# Patient Record
Sex: MALE | Race: WHITE | NOT HISPANIC OR LATINO | Employment: OTHER | URBAN - METROPOLITAN AREA
[De-identification: names, ages, dates, MRNs, and addresses within clinical notes are randomized per-mention and may not be internally consistent; named-entity substitution may affect disease eponyms.]

---

## 2021-09-12 ENCOUNTER — HOSPITAL ENCOUNTER (INPATIENT)
Facility: HOSPITAL | Age: 66
LOS: 7 days | Discharge: HOME HEALTH | DRG: 315 | End: 2021-09-19
Attending: EMERGENCY MEDICINE | Admitting: STUDENT IN AN ORGANIZED HEALTH CARE EDUCATION/TRAINING PROGRAM
Payer: COMMERCIAL

## 2021-09-12 ENCOUNTER — APPOINTMENT (EMERGENCY)
Dept: CT IMAGING | Facility: HOSPITAL | Age: 66
DRG: 315 | End: 2021-09-12
Payer: COMMERCIAL

## 2021-09-12 ENCOUNTER — APPOINTMENT (INPATIENT)
Dept: ULTRASOUND IMAGING | Facility: HOSPITAL | Age: 66
DRG: 315 | End: 2021-09-12
Payer: COMMERCIAL

## 2021-09-12 DIAGNOSIS — I26.99 PULMONARY INFARCT (HCC): ICD-10-CM

## 2021-09-12 DIAGNOSIS — J18.9 RIGHT UPPER LOBE PNEUMONIA: ICD-10-CM

## 2021-09-12 DIAGNOSIS — R19.7 DIARRHEA: ICD-10-CM

## 2021-09-12 DIAGNOSIS — I25.10 CORONARY ARTERY DISEASE: ICD-10-CM

## 2021-09-12 DIAGNOSIS — N42.1 CONGESTION AND HEMORRHAGE OF PROSTATE: ICD-10-CM

## 2021-09-12 DIAGNOSIS — R50.9 FEVER: Primary | ICD-10-CM

## 2021-09-12 DIAGNOSIS — A41.9 SEPSIS (HCC): ICD-10-CM

## 2021-09-12 DIAGNOSIS — I50.32 CHRONIC DIASTOLIC CONGESTIVE HEART FAILURE (HCC): ICD-10-CM

## 2021-09-12 PROBLEM — I10 HYPERTENSION: Status: ACTIVE | Noted: 2021-09-12

## 2021-09-12 PROBLEM — R65.10 SIRS (SYSTEMIC INFLAMMATORY RESPONSE SYNDROME) (HCC): Status: ACTIVE | Noted: 2021-09-12

## 2021-09-12 PROBLEM — I83.91 VARICOSE VEINS OF RIGHT LOWER EXTREMITY: Status: ACTIVE | Noted: 2021-09-12

## 2021-09-12 LAB
ALBUMIN SERPL BCP-MCNC: 3.4 G/DL (ref 3.5–5)
ALP SERPL-CCNC: 62 U/L (ref 46–116)
ALT SERPL W P-5'-P-CCNC: 27 U/L (ref 12–78)
ANION GAP SERPL CALCULATED.3IONS-SCNC: 10 MMOL/L (ref 4–13)
APTT PPP: 37 SECONDS (ref 23–37)
APTT PPP: 40 SECONDS (ref 23–37)
AST SERPL W P-5'-P-CCNC: 21 U/L (ref 5–45)
ATRIAL RATE: 73 BPM
BASOPHILS # BLD AUTO: 0.06 THOUSANDS/ΜL (ref 0–0.1)
BASOPHILS NFR BLD AUTO: 1 % (ref 0–1)
BILIRUB SERPL-MCNC: 0.85 MG/DL (ref 0.2–1)
BILIRUB UR QL STRIP: NEGATIVE
BUN SERPL-MCNC: 15 MG/DL (ref 5–25)
CALCIUM ALBUM COR SERPL-MCNC: 8.8 MG/DL (ref 8.3–10.1)
CALCIUM SERPL-MCNC: 8.3 MG/DL (ref 8.3–10.1)
CHLORIDE SERPL-SCNC: 99 MMOL/L (ref 100–108)
CLARITY UR: CLEAR
CO2 SERPL-SCNC: 24 MMOL/L (ref 21–32)
COLOR UR: YELLOW
CREAT SERPL-MCNC: 0.83 MG/DL (ref 0.6–1.3)
EOSINOPHIL # BLD AUTO: 0.02 THOUSAND/ΜL (ref 0–0.61)
EOSINOPHIL NFR BLD AUTO: 0 % (ref 0–6)
ERYTHROCYTE [DISTWIDTH] IN BLOOD BY AUTOMATED COUNT: 14 % (ref 11.6–15.1)
FLUAV RNA RESP QL NAA+PROBE: NEGATIVE
FLUBV RNA RESP QL NAA+PROBE: NEGATIVE
GFR SERPL CREATININE-BSD FRML MDRD: 92 ML/MIN/1.73SQ M
GLUCOSE SERPL-MCNC: 103 MG/DL (ref 65–140)
GLUCOSE UR STRIP-MCNC: NEGATIVE MG/DL
HCT VFR BLD AUTO: 35.5 % (ref 36.5–49.3)
HGB BLD-MCNC: 12.4 G/DL (ref 12–17)
HGB UR QL STRIP.AUTO: NEGATIVE
IMM GRANULOCYTES # BLD AUTO: 0.08 THOUSAND/UL (ref 0–0.2)
IMM GRANULOCYTES NFR BLD AUTO: 1 % (ref 0–2)
INR PPP: 1.1 (ref 0.84–1.19)
KETONES UR STRIP-MCNC: ABNORMAL MG/DL
LACTATE SERPL-SCNC: 0.7 MMOL/L (ref 0.5–2)
LEUKOCYTE ESTERASE UR QL STRIP: NEGATIVE
LIPASE SERPL-CCNC: 55 U/L (ref 73–393)
LYMPHOCYTES # BLD AUTO: 1.37 THOUSANDS/ΜL (ref 0.6–4.47)
LYMPHOCYTES NFR BLD AUTO: 12 % (ref 14–44)
MCH RBC QN AUTO: 31.2 PG (ref 26.8–34.3)
MCHC RBC AUTO-ENTMCNC: 34.9 G/DL (ref 31.4–37.4)
MCV RBC AUTO: 89 FL (ref 82–98)
MONOCYTES # BLD AUTO: 1.69 THOUSAND/ΜL (ref 0.17–1.22)
MONOCYTES NFR BLD AUTO: 15 % (ref 4–12)
NEUTROPHILS # BLD AUTO: 8.38 THOUSANDS/ΜL (ref 1.85–7.62)
NEUTS SEG NFR BLD AUTO: 71 % (ref 43–75)
NITRITE UR QL STRIP: NEGATIVE
NRBC BLD AUTO-RTO: 0 /100 WBCS
P AXIS: 67 DEGREES
PH UR STRIP.AUTO: 6.5 [PH]
PLATELET # BLD AUTO: 188 THOUSANDS/UL (ref 149–390)
PMV BLD AUTO: 10.2 FL (ref 8.9–12.7)
POTASSIUM SERPL-SCNC: 3.5 MMOL/L (ref 3.5–5.3)
PR INTERVAL: 178 MS
PROT SERPL-MCNC: 7.6 G/DL (ref 6.4–8.2)
PROT UR STRIP-MCNC: NEGATIVE MG/DL
PROTHROMBIN TIME: 14.4 SECONDS (ref 11.6–14.5)
QRS AXIS: 57 DEGREES
QRSD INTERVAL: 112 MS
QT INTERVAL: 424 MS
QTC INTERVAL: 464 MS
RBC # BLD AUTO: 3.97 MILLION/UL (ref 3.88–5.62)
RSV RNA RESP QL NAA+PROBE: NEGATIVE
SARS-COV-2 RNA RESP QL NAA+PROBE: NEGATIVE
SODIUM SERPL-SCNC: 133 MMOL/L (ref 136–145)
SP GR UR STRIP.AUTO: <=1.005 (ref 1–1.03)
T WAVE AXIS: 96 DEGREES
TROPONIN I SERPL-MCNC: <0.02 NG/ML
UROBILINOGEN UR QL STRIP.AUTO: 1 E.U./DL
VENTRICULAR RATE: 72 BPM
WBC # BLD AUTO: 11.6 THOUSAND/UL (ref 4.31–10.16)

## 2021-09-12 PROCEDURE — 84145 PROCALCITONIN (PCT): CPT | Performed by: PHYSICIAN ASSISTANT

## 2021-09-12 PROCEDURE — 99223 1ST HOSP IP/OBS HIGH 75: CPT | Performed by: STUDENT IN AN ORGANIZED HEALTH CARE EDUCATION/TRAINING PROGRAM

## 2021-09-12 PROCEDURE — 71275 CT ANGIOGRAPHY CHEST: CPT

## 2021-09-12 PROCEDURE — 80053 COMPREHEN METABOLIC PANEL: CPT | Performed by: PHYSICIAN ASSISTANT

## 2021-09-12 PROCEDURE — 96365 THER/PROPH/DIAG IV INF INIT: CPT

## 2021-09-12 PROCEDURE — 83605 ASSAY OF LACTIC ACID: CPT | Performed by: PHYSICIAN ASSISTANT

## 2021-09-12 PROCEDURE — 85730 THROMBOPLASTIN TIME PARTIAL: CPT | Performed by: PHYSICIAN ASSISTANT

## 2021-09-12 PROCEDURE — 74177 CT ABD & PELVIS W/CONTRAST: CPT

## 2021-09-12 PROCEDURE — 85025 COMPLETE CBC W/AUTO DIFF WBC: CPT | Performed by: PHYSICIAN ASSISTANT

## 2021-09-12 PROCEDURE — 93005 ELECTROCARDIOGRAM TRACING: CPT

## 2021-09-12 PROCEDURE — 0241U HB NFCT DS VIR RESP RNA 4 TRGT: CPT | Performed by: PHYSICIAN ASSISTANT

## 2021-09-12 PROCEDURE — G1004 CDSM NDSC: HCPCS

## 2021-09-12 PROCEDURE — 36415 COLL VENOUS BLD VENIPUNCTURE: CPT | Performed by: PHYSICIAN ASSISTANT

## 2021-09-12 PROCEDURE — 99285 EMERGENCY DEPT VISIT HI MDM: CPT | Performed by: PHYSICIAN ASSISTANT

## 2021-09-12 PROCEDURE — 87040 BLOOD CULTURE FOR BACTERIA: CPT | Performed by: PHYSICIAN ASSISTANT

## 2021-09-12 PROCEDURE — 93010 ELECTROCARDIOGRAM REPORT: CPT | Performed by: INTERNAL MEDICINE

## 2021-09-12 PROCEDURE — 84484 ASSAY OF TROPONIN QUANT: CPT | Performed by: PHYSICIAN ASSISTANT

## 2021-09-12 PROCEDURE — 81003 URINALYSIS AUTO W/O SCOPE: CPT | Performed by: PHYSICIAN ASSISTANT

## 2021-09-12 PROCEDURE — 83690 ASSAY OF LIPASE: CPT | Performed by: PHYSICIAN ASSISTANT

## 2021-09-12 PROCEDURE — 93971 EXTREMITY STUDY: CPT

## 2021-09-12 PROCEDURE — 99285 EMERGENCY DEPT VISIT HI MDM: CPT

## 2021-09-12 PROCEDURE — 96361 HYDRATE IV INFUSION ADD-ON: CPT

## 2021-09-12 PROCEDURE — 85610 PROTHROMBIN TIME: CPT | Performed by: PHYSICIAN ASSISTANT

## 2021-09-12 RX ORDER — FUROSEMIDE 40 MG/1
1 TABLET ORAL DAILY
COMMUNITY
Start: 2013-09-13 | End: 2021-11-02 | Stop reason: SDUPTHER

## 2021-09-12 RX ORDER — ISOSORBIDE MONONITRATE 60 MG/1
60 TABLET, EXTENDED RELEASE ORAL DAILY
Status: DISCONTINUED | OUTPATIENT
Start: 2021-09-12 | End: 2021-09-19 | Stop reason: HOSPADM

## 2021-09-12 RX ORDER — FUROSEMIDE 40 MG/1
40 TABLET ORAL DAILY
Status: DISCONTINUED | OUTPATIENT
Start: 2021-09-12 | End: 2021-09-19 | Stop reason: HOSPADM

## 2021-09-12 RX ORDER — SENNOSIDES 8.6 MG
1 TABLET ORAL DAILY
Status: DISCONTINUED | OUTPATIENT
Start: 2021-09-12 | End: 2021-09-19 | Stop reason: HOSPADM

## 2021-09-12 RX ORDER — ONDANSETRON 2 MG/ML
4 INJECTION INTRAMUSCULAR; INTRAVENOUS EVERY 6 HOURS PRN
Status: DISCONTINUED | OUTPATIENT
Start: 2021-09-12 | End: 2021-09-19 | Stop reason: HOSPADM

## 2021-09-12 RX ORDER — CARVEDILOL 12.5 MG/1
25 TABLET ORAL 2 TIMES DAILY WITH MEALS
Status: DISCONTINUED | OUTPATIENT
Start: 2021-09-12 | End: 2021-09-19 | Stop reason: HOSPADM

## 2021-09-12 RX ORDER — HYDROMORPHONE HCL/PF 1 MG/ML
1 SYRINGE (ML) INJECTION EVERY 4 HOURS PRN
Status: DISCONTINUED | OUTPATIENT
Start: 2021-09-12 | End: 2021-09-15

## 2021-09-12 RX ORDER — HYDRALAZINE HYDROCHLORIDE 20 MG/ML
5 INJECTION INTRAMUSCULAR; INTRAVENOUS EVERY 6 HOURS PRN
Status: DISCONTINUED | OUTPATIENT
Start: 2021-09-12 | End: 2021-09-19 | Stop reason: HOSPADM

## 2021-09-12 RX ORDER — LOSARTAN POTASSIUM 50 MG/1
100 TABLET ORAL DAILY
Status: DISCONTINUED | OUTPATIENT
Start: 2021-09-12 | End: 2021-09-19 | Stop reason: HOSPADM

## 2021-09-12 RX ORDER — DULOXETIN HYDROCHLORIDE 60 MG/1
60 CAPSULE, DELAYED RELEASE ORAL DAILY
COMMUNITY
Start: 2021-04-02 | End: 2021-11-02 | Stop reason: SDUPTHER

## 2021-09-12 RX ORDER — CLOPIDOGREL BISULFATE 75 MG/1
75 TABLET ORAL DAILY
Status: DISCONTINUED | OUTPATIENT
Start: 2021-09-12 | End: 2021-09-19 | Stop reason: HOSPADM

## 2021-09-12 RX ORDER — ATORVASTATIN CALCIUM 40 MG/1
40 TABLET, FILM COATED ORAL
Status: DISCONTINUED | OUTPATIENT
Start: 2021-09-12 | End: 2021-09-19 | Stop reason: HOSPADM

## 2021-09-12 RX ORDER — HEPARIN SODIUM 5000 [USP'U]/ML
5000 INJECTION, SOLUTION INTRAVENOUS; SUBCUTANEOUS EVERY 8 HOURS SCHEDULED
Status: DISCONTINUED | OUTPATIENT
Start: 2021-09-12 | End: 2021-09-17

## 2021-09-12 RX ORDER — CALCIUM CARBONATE 200(500)MG
1000 TABLET,CHEWABLE ORAL DAILY PRN
Status: DISCONTINUED | OUTPATIENT
Start: 2021-09-12 | End: 2021-09-19 | Stop reason: HOSPADM

## 2021-09-12 RX ORDER — FLUOXETINE HYDROCHLORIDE 40 MG/1
1 CAPSULE ORAL DAILY
COMMUNITY
End: 2021-11-02

## 2021-09-12 RX ORDER — GABAPENTIN 400 MG/1
400 CAPSULE ORAL
Status: DISCONTINUED | OUTPATIENT
Start: 2021-09-12 | End: 2021-09-19 | Stop reason: HOSPADM

## 2021-09-12 RX ORDER — HEPARIN SODIUM 1000 [USP'U]/ML
10000 INJECTION, SOLUTION INTRAVENOUS; SUBCUTANEOUS ONCE
Status: COMPLETED | OUTPATIENT
Start: 2021-09-12 | End: 2021-09-12

## 2021-09-12 RX ORDER — HYDROMORPHONE HCL/PF 1 MG/ML
0.5 SYRINGE (ML) INJECTION EVERY 6 HOURS PRN
Status: DISCONTINUED | OUTPATIENT
Start: 2021-09-12 | End: 2021-09-19 | Stop reason: HOSPADM

## 2021-09-12 RX ORDER — ACETAMINOPHEN 325 MG/1
975 TABLET ORAL ONCE
Status: COMPLETED | OUTPATIENT
Start: 2021-09-12 | End: 2021-09-12

## 2021-09-12 RX ORDER — OXYCODONE HYDROCHLORIDE AND ACETAMINOPHEN 5; 325 MG/1; MG/1
1 TABLET ORAL EVERY 4 HOURS PRN
Status: DISCONTINUED | OUTPATIENT
Start: 2021-09-12 | End: 2021-09-15

## 2021-09-12 RX ORDER — ACETAMINOPHEN 325 MG/1
650 TABLET ORAL EVERY 6 HOURS PRN
Status: DISCONTINUED | OUTPATIENT
Start: 2021-09-12 | End: 2021-09-19 | Stop reason: HOSPADM

## 2021-09-12 RX ORDER — FLUOXETINE 10 MG/1
40 CAPSULE ORAL DAILY
Status: DISCONTINUED | OUTPATIENT
Start: 2021-09-12 | End: 2021-09-19 | Stop reason: HOSPADM

## 2021-09-12 RX ORDER — ASPIRIN 81 MG/1
81 TABLET ORAL DAILY
COMMUNITY
Start: 2021-01-08 | End: 2021-09-19 | Stop reason: HOSPADM

## 2021-09-12 RX ORDER — DOCUSATE SODIUM 100 MG/1
100 CAPSULE, LIQUID FILLED ORAL 2 TIMES DAILY
Status: DISCONTINUED | OUTPATIENT
Start: 2021-09-12 | End: 2021-09-19 | Stop reason: HOSPADM

## 2021-09-12 RX ORDER — HEPARIN SODIUM 10000 [USP'U]/100ML
3-30 INJECTION, SOLUTION INTRAVENOUS
Status: DISCONTINUED | OUTPATIENT
Start: 2021-09-12 | End: 2021-09-12

## 2021-09-12 RX ORDER — RANOLAZINE 500 MG/1
1000 TABLET, EXTENDED RELEASE ORAL EVERY 12 HOURS SCHEDULED
Status: DISCONTINUED | OUTPATIENT
Start: 2021-09-12 | End: 2021-09-19 | Stop reason: HOSPADM

## 2021-09-12 RX ORDER — CLOPIDOGREL BISULFATE 75 MG/1
75 TABLET ORAL DAILY
COMMUNITY
Start: 2021-01-08 | End: 2021-11-02 | Stop reason: SDUPTHER

## 2021-09-12 RX ORDER — HEPARIN SODIUM 1000 [USP'U]/ML
5000 INJECTION, SOLUTION INTRAVENOUS; SUBCUTANEOUS
Status: DISCONTINUED | OUTPATIENT
Start: 2021-09-12 | End: 2021-09-12

## 2021-09-12 RX ORDER — HEPARIN SODIUM 1000 [USP'U]/ML
10000 INJECTION, SOLUTION INTRAVENOUS; SUBCUTANEOUS
Status: DISCONTINUED | OUTPATIENT
Start: 2021-09-12 | End: 2021-09-12

## 2021-09-12 RX ORDER — CARVEDILOL 25 MG/1
25 TABLET ORAL
COMMUNITY
Start: 2021-01-08 | End: 2021-11-02 | Stop reason: SDUPTHER

## 2021-09-12 RX ORDER — ASPIRIN 81 MG/1
81 TABLET ORAL DAILY
Status: DISCONTINUED | OUTPATIENT
Start: 2021-09-12 | End: 2021-09-17

## 2021-09-12 RX ORDER — GABAPENTIN 400 MG/1
CAPSULE ORAL
COMMUNITY
Start: 2021-05-04 | End: 2021-11-02 | Stop reason: SDUPTHER

## 2021-09-12 RX ADMIN — CARVEDILOL 25 MG: 12.5 TABLET, FILM COATED ORAL at 16:06

## 2021-09-12 RX ADMIN — GABAPENTIN 400 MG: 400 CAPSULE ORAL at 21:00

## 2021-09-12 RX ADMIN — ISOSORBIDE MONONITRATE 60 MG: 60 TABLET, EXTENDED RELEASE ORAL at 16:06

## 2021-09-12 RX ADMIN — OXYCODONE HYDROCHLORIDE AND ACETAMINOPHEN 1 TABLET: 5; 325 TABLET ORAL at 20:59

## 2021-09-12 RX ADMIN — CLOPIDOGREL BISULFATE 75 MG: 75 TABLET ORAL at 16:06

## 2021-09-12 RX ADMIN — CEFTRIAXONE 2000 MG: 2 INJECTION, POWDER, FOR SOLUTION INTRAMUSCULAR; INTRAVENOUS at 11:55

## 2021-09-12 RX ADMIN — FUROSEMIDE 40 MG: 40 TABLET ORAL at 16:07

## 2021-09-12 RX ADMIN — STANDARDIZED SENNA CONCENTRATE 8.6 MG: 8.6 TABLET ORAL at 14:28

## 2021-09-12 RX ADMIN — LOSARTAN POTASSIUM 100 MG: 50 TABLET, FILM COATED ORAL at 16:06

## 2021-09-12 RX ADMIN — HEPARIN SODIUM 5000 UNITS: 5000 INJECTION INTRAVENOUS; SUBCUTANEOUS at 16:07

## 2021-09-12 RX ADMIN — SODIUM CHLORIDE 1000 ML: 0.9 INJECTION, SOLUTION INTRAVENOUS at 10:03

## 2021-09-12 RX ADMIN — HEPARIN SODIUM 5000 UNITS: 5000 INJECTION INTRAVENOUS; SUBCUTANEOUS at 21:00

## 2021-09-12 RX ADMIN — ASPIRIN 81 MG: 81 TABLET, COATED ORAL at 16:06

## 2021-09-12 RX ADMIN — ACETAMINOPHEN 975 MG: 325 TABLET, FILM COATED ORAL at 10:00

## 2021-09-12 RX ADMIN — OXYCODONE HYDROCHLORIDE AND ACETAMINOPHEN 1 TABLET: 5; 325 TABLET ORAL at 16:07

## 2021-09-12 RX ADMIN — RANOLAZINE 1000 MG: 500 TABLET, FILM COATED, EXTENDED RELEASE ORAL at 20:59

## 2021-09-12 RX ADMIN — HEPARIN SODIUM 18 UNITS/KG/HR: 10000 INJECTION, SOLUTION INTRAVENOUS at 12:47

## 2021-09-12 RX ADMIN — ACETAMINOPHEN 650 MG: 325 TABLET, FILM COATED ORAL at 14:38

## 2021-09-12 RX ADMIN — FLUOXETINE HYDROCHLORIDE 40 MG: 10 CAPSULE ORAL at 16:08

## 2021-09-12 RX ADMIN — HEPARIN SODIUM 10000 UNITS: 1000 INJECTION INTRAVENOUS; SUBCUTANEOUS at 12:44

## 2021-09-12 RX ADMIN — IOHEXOL 100 ML: 350 INJECTION, SOLUTION INTRAVENOUS at 11:10

## 2021-09-12 RX ADMIN — ATORVASTATIN CALCIUM 40 MG: 40 TABLET, FILM COATED ORAL at 16:06

## 2021-09-12 NOTE — ASSESSMENT & PLAN NOTE
Wt Readings from Last 3 Encounters:   09/12/21 (!) 145 kg (320 lb)   02/24/14 (!) 147 kg (324 lb 4 oz)   12/13/13 (!) 144 kg (318 lb 4 9 oz)     · Last ECHO in 2020 with preserved EF  · Continue home dose lasix, monitor I/O, lytes, weight  · Does have lower extremity edema but reports baseline

## 2021-09-12 NOTE — H&P
[facility]  H&P- Rosey Nelson 1955, 77 y o  male MRN: [id number]  Unit/Bed#: -01 Encounter: [id number]  Primary Care Provider: No primary care provider on file  Date and time admitted to hospital: 9/12/2021  9:39 AM    * Community acquired pneumonia of right upper lobe of lung  Assessment & Plan  · Confirmed by CT imaging  · Start IV antibiotics, follow up infectious work up    Varicose veins of right lower extremity  Assessment & Plan  · Initially started on heparin gtt for concern of PE, later corrected by radiology as pneumonia  · Heparin gtt discontinued, but patient has indurated varicose vein in right thigh  · 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  · Check venous duplex    Hypertension  Assessment & Plan  · Poorly controlled  · Continue home dose coreg, losartan  · Add hydralazine PRN    SIRS (systemic inflammatory response syndrome) ([facility] [address] )  Assessment & Plan  · Secondary to pneumonia, see above    Chronic diastolic congestive heart failure ([facility] [address] )  Assessment & Plan  Wt Readings from Last 3 Encounters:   09/12/21 (!) 145 kg (320 lb)   02/24/14 (!) 147 kg (324 lb 4 oz)   12/13/13 (!) 144 kg (318 lb 4 9 oz)     · Last ECHO in 2020 with preserved EF  · Continue home dose lasix, monitor I/O, lytes, weight  · Does have lower extremity edema but reports baseline     Coronary artery disease  Assessment & Plan  · Multivessel CAD status post CABG in 2011  · Continue aspirin, ranexa, imdur, plavix, crestor equivalent  · Follows with [facility] cardiology     VTE Pharmacologic Prophylaxis: VTE Score: 5 High Risk (Score >/= 5) - Pharmacological DVT Prophylaxis Ordered: heparin  Sequential Compression Devices Ordered  Code Status: Level 1 - Full Code   Discussion with family: Updated  (wife) at bedside      Anticipated Length of Stay: Patient will be admitted on an inpatient basis with an anticipated length of stay of greater than 2 midnights secondary to IV antibiotics  Total Time for Visit, including Counseling / Coordination of Care: 30 minutes Greater than 50% of this total time spent on direct patient counseling and coordination of care  Chief Complaint: shortness of breath     History of Present Illness:  Otto Harvey is a 77 y o  male with a PMH of CAD, HTN, CHF who presents with shortness of breath acutely noted over last several days associated with chest pain worse with inspiration  Not improving over last few days so he came to the ED and initially had concerns for PE and started on heparin gtt, later confirmed for CAP and admitted for further management  Review of Systems:  Review of Systems   Constitutional: Negative for chills and fever  HENT: Negative for ear pain and sore throat  Eyes: Negative for pain and visual disturbance  Respiratory: Positive for shortness of breath  Negative for cough  Cardiovascular: Positive for chest pain and leg swelling  Negative for palpitations  Gastrointestinal: Negative for abdominal pain and vomiting  Genitourinary: Negative for dysuria and hematuria  Musculoskeletal: Negative for arthralgias and back pain  Skin: Negative for color change and rash  Neurological: Negative for seizures and syncope  All other systems reviewed and are negative  Past Medical and Surgical History:   No past medical history on file  No past surgical history on file  Meds/Allergies:  Prior to Admission medications    Medication Sig Start Date End Date Taking?  Authorizing Provider   aspirin (ECOTRIN LOW STRENGTH) 81 mg EC tablet Take 81 mg by mouth daily 1/8/21 1/8/22 Yes Historical Provider, MD   carvedilol (COREG) 25 mg tablet Take 25 mg by mouth 1/8/21 1/8/22 Yes Historical Provider, MD   clopidogrel (PLAVIX) 75 mg tablet Take 75 mg by mouth daily 1/8/21 1/8/22 Yes Historical Provider, MD   DULoxetine (CYMBALTA) 60 mg delayed release capsule Take 60 mg by mouth daily 4/2/21  Yes Historical Provider, MD furosemide (LASIX) 40 mg tablet Take 1 tablet by mouth daily 9/13/13  Yes Historical Provider, MD   gabapentin (NEURONTIN) 400 mg capsule Titrate up at night as needed for pain start with one tablet at night increase to two or three as needed each night, may wean if not needed 5/4/21  Yes Historical Provider, MD   FLUoxetine (PROzac) 40 MG capsule Take 1 capsule by mouth daily    Historical Provider, MD     I have reviewed home medications with patient personally  Allergies: Allergies   Allergen Reactions    Medical Tape Other (See Comments)    Sulfa Antibiotics Other (See Comments)    Fosinopril Cough     Cough      Lisinopril Cough       Social History:  Marital Status: /Civil Union   Occupation: na  Patient Pre-hospital Living Situation: Home  Patient Pre-hospital Level of Mobility: walks  Patient Pre-hospital Diet Restrictions: heart healthy  Substance Use History:   Social History     Substance and Sexual Activity   Alcohol Use Never     Social History     Tobacco Use   Smoking Status Never Smoker   Smokeless Tobacco Never Used     Social History     Substance and Sexual Activity   Drug Use Never       Family History:  No family history on file  Physical Exam:     Vitals:   Blood Pressure: 164/78 (09/12/21 1445)  Pulse: 73 (09/12/21 1445)  Temperature: (!) 102 4 °F (39 1 °C) (09/12/21 1400)  Temp Source: Oral (09/12/21 1400)  Respirations: 16 (09/12/21 1445)  Height: 5' 9" (175 3 cm) (09/12/21 1674)  Weight - Scale: (!) 145 kg (320 lb) (09/12/21 0937)  SpO2: 94 % (09/12/21 1445)    Physical Exam  Vitals and nursing note reviewed  Constitutional:       Appearance: Normal appearance  He is obese  HENT:      Head: Normocephalic and atraumatic  Right Ear: External ear normal       Left Ear: External ear normal       Nose: No congestion or rhinorrhea  Mouth/Throat:      Mouth: Mucous membranes are dry  Pharynx: Oropharynx is clear     Eyes:      General:         Right eye: No discharge  Left eye: No discharge  Cardiovascular:      Rate and Rhythm: Normal rate and regular rhythm  Pulmonary:      Effort: Pulmonary effort is normal  No respiratory distress  Abdominal:      General: Abdomen is flat  Palpations: Abdomen is soft  Musculoskeletal:      Cervical back: Normal range of motion and neck supple  Right lower leg: Edema present  Left lower leg: Edema present  Skin:     General: Skin is warm and dry  Neurological:      General: No focal deficit present  Mental Status: He is alert  Mental status is at baseline  Psychiatric:         Mood and Affect: Mood normal          Behavior: Behavior normal           Additional Data:     Lab Results:  Results from last 7 days   Lab Units 09/12/21  0957   WBC Thousand/uL 11 60*   HEMOGLOBIN g/dL 12 4   HEMATOCRIT % 35 5*   PLATELETS Thousands/uL 188   NEUTROS PCT % 71   LYMPHS PCT % 12*   MONOS PCT % 15*   EOS PCT % 0     Results from last 7 days   Lab Units 09/12/21  0957   SODIUM mmol/L 133*   POTASSIUM mmol/L 3 5   CHLORIDE mmol/L 99*   CO2 mmol/L 24   BUN mg/dL 15   CREATININE mg/dL 0 83   ANION GAP mmol/L 10   CALCIUM mg/dL 8 3   ALBUMIN g/dL 3 4*   TOTAL BILIRUBIN mg/dL 0 85   ALK PHOS U/L 62   ALT U/L 27   AST U/L 21   GLUCOSE RANDOM mg/dL 103     Results from last 7 days   Lab Units 09/12/21  0957   INR  1 10             Results from last 7 days   Lab Units 09/12/21  0957   LACTIC ACID mmol/L 0 7       Imaging: Reviewed radiology reports from this admission including: chest CT scan  PE Study with CT Abdomen and Pelvis with contrast   Final Result by Amaris Levy MD (09/12 1312)   1  Peripheral consolidation in the right upper lobe with surrounding groundglass    The appearance is typically seen with pulmonary infarction; however, there is no definite pulmonary embolism, noting that the distal branches of the pulmonary artery are    not well-visualized, and the soft tissue in the right hilar region is favored to be a prominent lymph node and not an intraluminal thrombus  Moreover, given the size of the consolidation, a larger, more conspicuous thrombus would have been expected  Therefore (and given patient's fever) this is favored to represent pneumonia, and a follow-up a chest CT is recommended in 3 months to document resolution and to ensure the absence of an underlying lesion  2   Small right pleural effusion  3   Indeterminant right renal lesion  Nonemergent contrast-enhanced MRI of the abdomen is advised for further evaluation  4   Gallbladder luminal distention without cholelithiasis or findings of acute cholecystitis  Findings are likely reactive  I personally discussed this study with Joel Ambrocio on 9/12/2021 at 12:08 PM and 12: 62 PM                      Workstation performed: [id number]         VAS lower limb venous duplex study, unilateral/limited    (Results Pending)       EKG and Other Studies Reviewed on Admission:   · EKG: NSR  HR 72     ** Please Note: This note has been constructed using a voice recognition system   **

## 2021-09-12 NOTE — ASSESSMENT & PLAN NOTE
· Multivessel CAD status post CABG in 2011  · Continue aspirin, ranexa, imdur, plavix, crestor equivalent  · Follows with [facility] cardiology

## 2021-09-12 NOTE — ED PROVIDER NOTES
History  Chief Complaint   Patient presents with    Abdominal Pain     pt c/o RUQ abd pain, chills and sweats since thursday, pt also c/p sob  73yo male with a history of coronary artery disease s/p CABG, hypertension, and obesity presenting for evaluation of right-sided chest pain x several days  He describes a sharp pain in his right lung that worsens with inspiration  Patient is also having a dry cough, fevers, and chills and presents today with a fever of 103  Patient admits to shortness of breath  He is fully vaccinated against COVID  Patient denies any sick contacts  He is otherwise asymptomatic and denies any vomiting or diarrhea  History provided by:  Patient   used: No    Chest Pain  Pain location:  R chest  Pain quality: sharp    Pain radiates to:  Does not radiate  Pain radiates to the back: yes    Pain severity:  Moderate  Onset quality:  Gradual  Timing:  Constant  Progression:  Worsening  Chronicity:  New  Context: breathing    Relieved by:  Nothing  Worsened by:  Deep breathing  Associated symptoms: cough, fatigue, fever, lower extremity edema, shortness of breath and weakness    Associated symptoms: no abdominal pain, no altered mental status, no anxiety, no heartburn, no near-syncope, no numbness, no palpitations, no syncope and not vomiting        Prior to Admission Medications   Prescriptions Last Dose Informant Patient Reported? Taking?    DULoxetine (CYMBALTA) 60 mg delayed release capsule   Yes Yes   Sig: Take 60 mg by mouth daily   FLUoxetine (PROzac) 40 MG capsule   Yes No   Sig: Take 1 capsule by mouth daily   aspirin (ECOTRIN LOW STRENGTH) 81 mg EC tablet   Yes Yes   Sig: Take 81 mg by mouth daily   carvedilol (COREG) 25 mg tablet   Yes Yes   Sig: Take 25 mg by mouth   clopidogrel (PLAVIX) 75 mg tablet   Yes Yes   Sig: Take 75 mg by mouth daily   furosemide (LASIX) 40 mg tablet   Yes Yes   Sig: Take 1 tablet by mouth daily   gabapentin (NEURONTIN) 400 mg capsule   Yes Yes   Sig: Titrate up at night as needed for pain start with one tablet at night increase to two or three as needed each night, may wean if not needed      Facility-Administered Medications: None       No past medical history on file  No past surgical history on file  No family history on file  I have reviewed and agree with the history as documented  E-Cigarette/Vaping     E-Cigarette/Vaping Substances     Social History     Tobacco Use    Smoking status: Never Smoker    Smokeless tobacco: Never Used   Substance Use Topics    Alcohol use: Not on file    Drug use: Never       Review of Systems   Constitutional: Positive for chills, fatigue and fever  HENT: Negative for drooling and voice change  Eyes: Negative for discharge and redness  Respiratory: Positive for cough and shortness of breath  Negative for stridor  Cardiovascular: Positive for chest pain  Negative for palpitations, leg swelling, syncope and near-syncope  Gastrointestinal: Negative for abdominal pain, heartburn and vomiting  Musculoskeletal: Negative for neck pain and neck stiffness  Skin: Negative for color change and rash  Neurological: Positive for weakness  Negative for seizures, syncope and numbness  Psychiatric/Behavioral: Negative for confusion  The patient is not nervous/anxious  All other systems reviewed and are negative  Physical Exam  Physical Exam  Vitals and nursing note reviewed  Constitutional:       General: He is not in acute distress  Appearance: He is well-developed  He is obese  He is ill-appearing  He is not diaphoretic  HENT:      Head: Normocephalic and atraumatic  Right Ear: External ear normal       Left Ear: External ear normal    Eyes:      General: No scleral icterus  Right eye: No discharge  Left eye: No discharge  Conjunctiva/sclera: Conjunctivae normal    Cardiovascular:      Rate and Rhythm: Normal rate and regular rhythm  Heart sounds: Normal heart sounds  No murmur heard  Pulmonary:      Effort: Pulmonary effort is normal  No respiratory distress  Breath sounds: No stridor  Examination of the right-middle field reveals rales  Examination of the right-lower field reveals rales  Rales present  No wheezing  Abdominal:      General: Bowel sounds are normal  There is no distension  Palpations: Abdomen is soft  Tenderness: There is abdominal tenderness in the right upper quadrant  There is no guarding  Negative signs include Faustin's sign  Musculoskeletal:         General: No deformity  Normal range of motion  Cervical back: Normal range of motion and neck supple  Skin:     General: Skin is warm and dry  Neurological:      General: No focal deficit present  Mental Status: He is alert  He is not disoriented  GCS: GCS eye subscore is 4  GCS verbal subscore is 5  GCS motor subscore is 6     Psychiatric:         Behavior: Behavior normal          Vital Signs  ED Triage Vitals   Temperature Pulse Respirations Blood Pressure SpO2   09/12/21 0937 09/12/21 0937 09/12/21 0937 09/12/21 0937 09/12/21 0937   (!) 103 3 °F (39 6 °C) 72 20 (!) 184/89 94 %      Temp Source Heart Rate Source Patient Position - Orthostatic VS BP Location FiO2 (%)   09/12/21 0937 09/12/21 0937 09/12/21 0937 09/12/21 0937 --   Oral Monitor Sitting Left arm       Pain Score       09/12/21 1000       8           Vitals:    09/12/21 1030 09/12/21 1110 09/12/21 1130 09/12/21 1230   BP: 150/76 143/65 141/64 150/69   Pulse: 71 75 71 64   Patient Position - Orthostatic VS: Lying Lying Lying Lying         Visual Acuity      ED Medications  Medications   heparin (porcine) 25,000 units in 0 45% NaCl 250 mL infusion (premix) (18 Units/kg/hr × 125 kg (Order-Specific) Intravenous New Bag 9/12/21 1247)   heparin (porcine) injection 10,000 Units (has no administration in time range)   heparin (porcine) injection 5,000 Units (has no administration in time range)   sodium chloride 0 9 % bolus 1,000 mL (0 mL Intravenous Stopped 9/12/21 1233)   acetaminophen (TYLENOL) tablet 975 mg (975 mg Oral Given 9/12/21 1000)   iohexol (OMNIPAQUE) 350 MG/ML injection (SINGLE-DOSE) 100 mL (100 mL Intravenous Given 9/12/21 1110)   cefTRIAXone (ROCEPHIN) 2,000 mg in dextrose 5 % 50 mL IVPB (0 mg Intravenous Stopped 9/12/21 1235)   heparin (porcine) injection 10,000 Units (10,000 Units Intravenous Given 9/12/21 1244)       Diagnostic Studies  Results Reviewed     Procedure Component Value Units Date/Time    APTT [[id number]]  (Normal) Collected: 09/12/21 1229    Lab Status: Final result Specimen: Blood from Arm, Right Updated: 09/12/21 1250     PTT 37 seconds     UA w Reflex to Microscopic w Reflex to Culture [[id number]]  (Abnormal) Collected: 09/12/21 1155    Lab Status: Final result Specimen: Urine, Clean Catch Updated: 09/12/21 1201     Color, UA Yellow     Clarity, UA Clear     Specific Gravity, UA <=1 005     pH, UA 6 5     Leukocytes, UA Negative     Nitrite, UA Negative     Protein, UA Negative mg/dl      Glucose, UA Negative mg/dl      Ketones, UA 15 (1+) mg/dl      Urobilinogen, UA 1 0 E U /dl      Bilirubin, UA Negative     Blood, UA Negative    COVID19, Influenza A/B, RSV PCR, [facility] [[id number]]  (Normal) Collected: 09/12/21 0957    Lab Status: Final result Specimen: Nares from Nose Updated: 09/12/21 1052     SARS-CoV-2 Negative     INFLUENZA A PCR Negative     INFLUENZA B PCR Negative     RSV PCR Negative    Narrative: This test has been authorized by FDA under an EUA (Emergency Use Assay) for use by authorized laboratories  Clinical caution and judgement should be used with the interpretation of these results with consideration of the clinical impression and other laboratory testing  Testing reported as "Positive" or "Negative" has been proven to be accurate according to standard laboratory validation requirements    All testing is performed with control materials showing appropriate reactivity at standard intervals  Lactic acid [[id number]]  (Normal) Collected: 09/12/21 0957    Lab Status: Final result Specimen: Blood from Arm, Left Updated: 09/12/21 1030     LACTIC ACID 0 7 mmol/L     Narrative:      Result may be elevated if tourniquet was used during collection      Troponin I [[id number]]  (Normal) Collected: 09/12/21 0957    Lab Status: Final result Specimen: Blood from Arm, Left Updated: 09/12/21 1030     Troponin I <0 02 ng/mL     Comprehensive metabolic panel [[id number]]  (Abnormal) Collected: 09/12/21 0957    Lab Status: Final result Specimen: Blood from Arm, Left Updated: 09/12/21 1027     Sodium 133 mmol/L      Potassium 3 5 mmol/L      Chloride 99 mmol/L      CO2 24 mmol/L      ANION GAP 10 mmol/L      BUN 15 mg/dL      Creatinine 0 83 mg/dL      Glucose 103 mg/dL      Calcium 8 3 mg/dL      Corrected Calcium 8 8 mg/dL      AST 21 U/L      ALT 27 U/L      Alkaline Phosphatase 62 U/L      Total Protein 7 6 g/dL      Albumin 3 4 g/dL      Total Bilirubin 0 85 mg/dL      eGFR 92 ml/min/1 73sq m     Narrative:      [place] guidelines for Chronic Kidney Disease (CKD):     Stage 1 with normal or high GFR (GFR > 90 mL/min/1 73 square meters)    Stage 2 Mild CKD (GFR = 60-89 mL/min/1 73 square meters)    Stage 3A Moderate CKD (GFR = 45-59 mL/min/1 73 square meters)    Stage 3B Moderate CKD (GFR = 30-44 mL/min/1 73 square meters)    Stage 4 Severe CKD (GFR = 15-29 mL/min/1 73 square meters)    Stage 5 End Stage CKD (GFR <15 mL/min/1 73 square meters)  Note: GFR calculation is accurate only with a steady state creatinine    APTT [[id number]]  (Abnormal) Collected: 09/12/21 0957    Lab Status: Final result Specimen: Blood from Arm, Left Updated: 09/12/21 1023     PTT 40 seconds     Protime-INR [[id number]]  (Normal) Collected: 09/12/21 0957    Lab Status: Final result Specimen: Blood from Arm, Left Updated: 09/12/21 1023     Protime 14 4 seconds      INR 1 10    CBC and differential [[id number]]  (Abnormal) Collected: 09/12/21 0957    Lab Status: Final result Specimen: Blood from Arm, Left Updated: 09/12/21 1009     WBC 11 60 Thousand/uL      RBC 3 97 Million/uL      Hemoglobin 12 4 g/dL      Hematocrit 35 5 %      MCV 89 fL      MCH 31 2 pg      MCHC 34 9 g/dL      RDW 14 0 %      MPV 10 2 fL      Platelets 513 Thousands/uL      nRBC 0 /100 WBCs      Neutrophils Relative 71 %      Immat GRANS % 1 %      Lymphocytes Relative 12 %      Monocytes Relative 15 %      Eosinophils Relative 0 %      Basophils Relative 1 %      Neutrophils Absolute 8 38 Thousands/µL      Immature Grans Absolute 0 08 Thousand/uL      Lymphocytes Absolute 1 37 Thousands/µL      Monocytes Absolute 1 69 Thousand/µL      Eosinophils Absolute 0 02 Thousand/µL      Basophils Absolute 0 06 Thousands/µL     Blood culture #2 [[id number]] Collected: 09/12/21 0957    Lab Status: In process Specimen: Blood from Arm, Left Updated: 09/12/21 1006    Blood culture #1 [[id number]] Collected: 09/12/21 0957    Lab Status: In process Specimen: Blood from Arm, Right Updated: 09/12/21 1006    Procalcitonin with AM Reflex [[id number]] Collected: 09/12/21 0957    Lab Status: In process Specimen: Blood from Arm, Left Updated: 09/12/21 1006    Lipase [[id number]]     Lab Status: No result Specimen: Blood                  PE Study with CT Abdomen and Pelvis with contrast   Final Result by Kelby Anderson MD (09/12 1312)   1  Peripheral consolidation in the right upper lobe with surrounding groundglass  The appearance is typically seen with pulmonary infarction; however, there is no definite pulmonary embolism, noting that the distal branches of the pulmonary artery are    not well-visualized, and the soft tissue in the right hilar region is favored to be a prominent lymph node and not an intraluminal thrombus    Moreover, given the size of the consolidation, a larger, more conspicuous thrombus would have been expected  Therefore (and given patient's fever) this is favored to represent pneumonia, and a follow-up a chest CT is recommended in 3 months to document resolution and to ensure the absence of an underlying lesion  2   Small right pleural effusion  3   Indeterminant right renal lesion  Nonemergent contrast-enhanced MRI of the abdomen is advised for further evaluation  4   Gallbladder luminal distention without cholelithiasis or findings of acute cholecystitis  Findings are likely reactive               I personally discussed this study with Keaton Romeo on 9/12/2021 at 12:08 PM and 12: 62 PM                      Workstation performed: [id number]                    Procedures  ECG 12 Lead Documentation Only    Date/Time: 9/12/2021 12:24 PM  Performed by: Niall Briggs PA-C  Authorized by: Niall Briggs PA-C     Indications / Diagnosis:  SOB, CP  ECG reviewed by me, the ED Provider: yes    Patient location:  ED  Previous ECG:     Previous ECG:  Compared to current    Comparison ECG info:  7/25/13  Interpretation:     Interpretation: abnormal    Rate:     ECG rate:  72    ECG rate assessment: normal    Rhythm:     Rhythm: sinus rhythm    Ectopy:     Ectopy: none    QRS:     QRS axis:  Normal  Conduction:     Conduction: normal    ST segments:     ST segments:  Non-specific  T waves:     T waves: non-specific and inverted      Inverted:  I and aVL             ED Course  ED Course as of Sep 12 1314   Sun Sep 12, 2021   1139 SARS-COV-2: Negative                   Wells' Criteria for PE      Most Recent Value   Wells' Criteria for PE   Clinical signs and symptoms of DVT  0 Filed at: 09/12/2021 5225   PE is primary diagnosis or equally likely  0 Filed at: 09/12/2021 0949   HR >100  0 Filed at: 09/12/2021 0949   Immobilization at least 3 days or Surgery in the previous 4 weeks  0 Filed at: 09/12/2021 0949   Previous, objectively diagnosed PE or DVT  0 Filed at: 09/12/2021 4716 Hemoptysis  0 Filed at: 09/12/2021 4418   Malignancy with treatment within 6 months or palliative  0 Filed at: 09/12/2021 2296   Wells' Criteria Total  0 Filed at: 09/12/2021 2968                MDM  Number of Diagnoses or Management Options  Right upper lobe pneumonia: new and requires workup  Sepsis [facility]): new and requires workup  Diagnosis management comments: 71yo male with multiple medical comorbidities presenting for evaluation of right-sided chest pain, dyspnea, and fevers  Patient arrives with a temp of 103 and is ill appearing  Rales present on lung exam   Differential diagnosis includes but is not limited to:  COVID, pneumonia, pleural effusion, pneumothorax, pulmonary edema, pulmonary embolism, ACS, sepsis    Initial ED plan: Check septic workup including lactate and blood cultures  Will check COVID swab, EKG, and cardiac labs as well  CTA CAP to evaluate for source of infection and r/o PE  Tylenol and IV fluid bolus for symptoms  Final assessment:  Labs significant for a leukocytosis with a white count of 11 6  Lactate is normal   Remainder of labs overall unremarkable  COVID test negative  EKG has nonspecific ST changes troponin is normal   Radiology initially called me and stated that CT chest was concerning for pulmonary embolism and pulmonary infarct  I was then called approximately 1 hour later and radiology is now saying that the infiltrate in the right lung is likely infectious and that what was previously thought to be a PE appears more likely to be a lymph node  Patient was started on heparin drip after 1st discussion with Radiology  IV Rocephin was also ordered  Patient admitted for further evaluation and management         Amount and/or Complexity of Data Reviewed  Clinical lab tests: ordered and reviewed  Tests in the radiology section of CPT®: ordered and reviewed  Tests in the medicine section of CPT®: ordered and reviewed  Review and summarize past medical records: yes  Discuss the patient with other providers: yes  Independent visualization of images, tracings, or specimens: yes    Risk of Complications, Morbidity, and/or Mortality  Presenting problems: high  Diagnostic procedures: high  Management options: high        Disposition  Final diagnoses:   Right upper lobe pneumonia   Sepsis ([facility] [address] )     Time reflects when diagnosis was documented in both MDM as applicable and the Disposition within this note     Time User Action Codes Description Comment    9/12/2021 12:27  [facility] [place] [I26 [id number]] Pulmonary embolism ([facility] [address] )     9/12/2021 12:27  [facility] [place] [I26 [id number]] Pulmonary infarct ([facility] [address] )     9/12/2021 12:27 PM Sharon Urbina Add [R65 10] SIRS (systemic inflammatory response syndrome) ([facility] [address] )     9/12/2021  1:13  [facility] [address] [[id number]] Pulmonary infarct ([facility] [address] )     9/12/2021  1:13 PM Sharon Urbina Remove [R65 10] SIRS (systemic inflammatory response syndrome) ([facility] [address] )     9/12/2021  1:14  [facility], [facility] [place] [J18 9] Right upper lobe pneumonia     9/12/2021  1:14  [facility], [facility] [A41 9] Sepsis ([facility] [address] )     9/12/2021  1:14  [facility] [address] [place] [address] [J18 9] Right upper lobe pneumonia     9/12/2021  1:14  [facility] [address] [[id number] 99] Pulmonary embolism [facility])       ED Disposition     ED Disposition Condition Date/Time Comment    Admit Stable Sun Sep 12, 2021 12:27 PM Case was discussed with Dr Joyce Martin and the patient's admission status was agreed to be Admission Status: inpatient status to the service of Dr Joyce Martin   Follow-up Information    None         Patient's Medications   Discharge Prescriptions    No medications on file     No discharge procedures on file      PDMP Review     None          ED Provider  Electronically Signed by           Linnea Mccartney PA-C  09/12/21 9365

## 2021-09-12 NOTE — ASSESSMENT & PLAN NOTE
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

## 2021-09-13 LAB
ANION GAP SERPL CALCULATED.3IONS-SCNC: 11 MMOL/L (ref 4–13)
BUN SERPL-MCNC: 14 MG/DL (ref 5–25)
CALCIUM SERPL-MCNC: 8 MG/DL (ref 8.3–10.1)
CHLORIDE SERPL-SCNC: 101 MMOL/L (ref 100–108)
CO2 SERPL-SCNC: 24 MMOL/L (ref 21–32)
CREAT SERPL-MCNC: 0.82 MG/DL (ref 0.6–1.3)
ERYTHROCYTE [DISTWIDTH] IN BLOOD BY AUTOMATED COUNT: 13.9 % (ref 11.6–15.1)
GFR SERPL CREATININE-BSD FRML MDRD: 92 ML/MIN/1.73SQ M
GLUCOSE SERPL-MCNC: 109 MG/DL (ref 65–140)
HCT VFR BLD AUTO: 33.7 % (ref 36.5–49.3)
HGB BLD-MCNC: 11.7 G/DL (ref 12–17)
MAGNESIUM SERPL-MCNC: 1.9 MG/DL (ref 1.6–2.6)
MCH RBC QN AUTO: 31.1 PG (ref 26.8–34.3)
MCHC RBC AUTO-ENTMCNC: 34.7 G/DL (ref 31.4–37.4)
MCV RBC AUTO: 90 FL (ref 82–98)
PLATELET # BLD AUTO: 175 THOUSANDS/UL (ref 149–390)
PMV BLD AUTO: 9.9 FL (ref 8.9–12.7)
POTASSIUM SERPL-SCNC: 3.5 MMOL/L (ref 3.5–5.3)
PROCALCITONIN SERPL-MCNC: 0.08 NG/ML
PROCALCITONIN SERPL-MCNC: 0.1 NG/ML
RBC # BLD AUTO: 3.76 MILLION/UL (ref 3.88–5.62)
SODIUM SERPL-SCNC: 136 MMOL/L (ref 136–145)
WBC # BLD AUTO: 10.08 THOUSAND/UL (ref 4.31–10.16)

## 2021-09-13 PROCEDURE — 85027 COMPLETE CBC AUTOMATED: CPT | Performed by: STUDENT IN AN ORGANIZED HEALTH CARE EDUCATION/TRAINING PROGRAM

## 2021-09-13 PROCEDURE — 84145 PROCALCITONIN (PCT): CPT | Performed by: PHYSICIAN ASSISTANT

## 2021-09-13 PROCEDURE — 83735 ASSAY OF MAGNESIUM: CPT | Performed by: STUDENT IN AN ORGANIZED HEALTH CARE EDUCATION/TRAINING PROGRAM

## 2021-09-13 PROCEDURE — 93971 EXTREMITY STUDY: CPT | Performed by: SURGERY

## 2021-09-13 PROCEDURE — 99233 SBSQ HOSP IP/OBS HIGH 50: CPT | Performed by: STUDENT IN AN ORGANIZED HEALTH CARE EDUCATION/TRAINING PROGRAM

## 2021-09-13 PROCEDURE — 80048 BASIC METABOLIC PNL TOTAL CA: CPT | Performed by: STUDENT IN AN ORGANIZED HEALTH CARE EDUCATION/TRAINING PROGRAM

## 2021-09-13 PROCEDURE — 94760 N-INVAS EAR/PLS OXIMETRY 1: CPT

## 2021-09-13 RX ADMIN — HEPARIN SODIUM 5000 UNITS: 5000 INJECTION INTRAVENOUS; SUBCUTANEOUS at 14:54

## 2021-09-13 RX ADMIN — RANOLAZINE 1000 MG: 500 TABLET, FILM COATED, EXTENDED RELEASE ORAL at 08:35

## 2021-09-13 RX ADMIN — DOCUSATE SODIUM 100 MG: 100 CAPSULE, LIQUID FILLED ORAL at 17:02

## 2021-09-13 RX ADMIN — FUROSEMIDE 40 MG: 40 TABLET ORAL at 08:35

## 2021-09-13 RX ADMIN — CEFTRIAXONE 2000 MG: 2 INJECTION, POWDER, FOR SOLUTION INTRAMUSCULAR; INTRAVENOUS at 11:02

## 2021-09-13 RX ADMIN — CLOPIDOGREL BISULFATE 75 MG: 75 TABLET ORAL at 08:35

## 2021-09-13 RX ADMIN — HEPARIN SODIUM 5000 UNITS: 5000 INJECTION INTRAVENOUS; SUBCUTANEOUS at 05:21

## 2021-09-13 RX ADMIN — STANDARDIZED SENNA CONCENTRATE 8.6 MG: 8.6 TABLET ORAL at 08:35

## 2021-09-13 RX ADMIN — GABAPENTIN 400 MG: 400 CAPSULE ORAL at 21:22

## 2021-09-13 RX ADMIN — ASPIRIN 81 MG: 81 TABLET, COATED ORAL at 08:34

## 2021-09-13 RX ADMIN — ATORVASTATIN CALCIUM 40 MG: 40 TABLET, FILM COATED ORAL at 17:02

## 2021-09-13 RX ADMIN — CARVEDILOL 25 MG: 12.5 TABLET, FILM COATED ORAL at 17:02

## 2021-09-13 RX ADMIN — CARVEDILOL 25 MG: 12.5 TABLET, FILM COATED ORAL at 08:34

## 2021-09-13 RX ADMIN — HEPARIN SODIUM 5000 UNITS: 5000 INJECTION INTRAVENOUS; SUBCUTANEOUS at 21:25

## 2021-09-13 RX ADMIN — RANOLAZINE 1000 MG: 500 TABLET, FILM COATED, EXTENDED RELEASE ORAL at 21:22

## 2021-09-13 RX ADMIN — HYDROMORPHONE HYDROCHLORIDE 1 MG: 1 INJECTION, SOLUTION INTRAMUSCULAR; INTRAVENOUS; SUBCUTANEOUS at 14:54

## 2021-09-13 RX ADMIN — OXYCODONE HYDROCHLORIDE AND ACETAMINOPHEN 1 TABLET: 5; 325 TABLET ORAL at 06:27

## 2021-09-13 RX ADMIN — LOSARTAN POTASSIUM 100 MG: 50 TABLET, FILM COATED ORAL at 08:34

## 2021-09-13 RX ADMIN — ISOSORBIDE MONONITRATE 60 MG: 60 TABLET, EXTENDED RELEASE ORAL at 08:35

## 2021-09-13 RX ADMIN — ACETAMINOPHEN 650 MG: 325 TABLET, FILM COATED ORAL at 11:14

## 2021-09-13 RX ADMIN — DOCUSATE SODIUM 100 MG: 100 CAPSULE, LIQUID FILLED ORAL at 08:35

## 2021-09-13 RX ADMIN — FLUOXETINE HYDROCHLORIDE 40 MG: 10 CAPSULE ORAL at 08:36

## 2021-09-13 NOTE — ASSESSMENT & PLAN NOTE
· Poorly controlled on admission, now improved   · Continue home dose coreg, losartan, prn hydralazine

## 2021-09-13 NOTE — ASSESSMENT & PLAN NOTE
· Confirmed by CT imaging  · Could be viral etiology given procalcitonin negative  · Covid 19 negative, recheck procalcitonin   · Does have ground glass infiltrates, spiking fevers despite antibiotic initiation  · Has severe fatigue, consider rechecking covid 19 vs treating as presumed + if symptoms   · Continue IV antibiotics for now, still having low grade fevers  · Follow up infectious work up, blood cultures still in process

## 2021-09-13 NOTE — PROGRESS NOTES
[facility]  Progress Note - Ebenezer Bauman 1955, 77 y o  male MRN: [id number]  Unit/Bed#: -01 Encounter: [id number]  Primary Care Provider: No primary care provider on file     Date and time admitted to hospital: 9/12/2021  9:39 AM    * Community acquired pneumonia of right upper lobe of lung  Assessment & Plan  · Confirmed by CT imaging  · Could be viral etiology given procalcitonin negative  · Covid 19 negative, recheck procalcitonin   · Does have ground glass infiltrates, spiking fevers despite antibiotic initiation  · Has severe fatigue, consider rechecking covid 19 vs treating as presumed + if symptoms   · Continue IV antibiotics for now, still having low grade fevers  · Follow up infectious work up, blood cultures still in process     Varicose veins of right lower extremity  Assessment & Plan  · Initially started on heparin gtt for concern of PE, later corrected by radiology as pneumonia  · Heparin gtt discontinued, but patient has indurated varicose vein in right thigh  · 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  · Venous duplex negative for dvt   · Follow up with vascular surgery as an outpatient     Hypertension  Assessment & Plan  · Poorly controlled on admission, now improved   · Continue home dose coreg, losartan, prn hydralazine     SIRS (systemic inflammatory response syndrome) ([facility] [address] )  Assessment & Plan  · Secondary to pneumonia, see above    Chronic diastolic congestive heart failure (HCC)  Assessment & Plan  Wt Readings from Last 3 Encounters:   09/12/21 (!) 145 kg (320 lb)   02/24/14 (!) 147 kg (324 lb 4 oz)   12/13/13 (!) 144 kg (318 lb 4 9 oz)     · Last ECHO in 2020 with preserved EF  · Continue home dose lasix, monitor I/O, lytes, weight  · Does have lower extremity edema but reports baseline     Coronary artery disease  Assessment & Plan  · Multivessel CAD status post CABG in 2011  · Continue aspirin, ranexa, imdur, plavix, crestor equivalent  · Follows with [facility] cardiology         VTE Pharmacologic Prophylaxis: VTE Score: 5 High Risk (Score >/= 5) - Pharmacological DVT Prophylaxis Ordered: heparin  Sequential Compression Devices Ordered  Patient Centered Rounds: I performed bedside rounds with nursing staff today  Discussions with Specialists or Other Care Team Provider: mayra    Education and Discussions with Family / Patient: Patient declined call to   Time Spent for Care: 30 minutes  More than 50% of total time spent on counseling and coordination of care as described above  Current Length of Stay: 1 day(s)  Current Patient Status: Inpatient   Certification Statement: The patient will continue to require additional inpatient hospital stay due to IV antibiotics  Discharge Plan: Anticipate discharge in 24-48 hrs to home  Code Status: Level 1 - Full Code    Subjective:   No chest pain or chest palpitations  No shortness of breath  Appetite is good  Objective:     Vitals:   Temp (24hrs), Av 6 °F (37 6 °C), Min:98 8 °F (37 1 °C), Max:100 4 °F (38 °C)    Temp:  [98 8 °F (37 1 °C)-100 4 °F (38 °C)] 99 3 °F (37 4 °C)  HR:  [58-76] 60  Resp:  [18-19] 18  BP: (127-184)/(59-82) 127/60  SpO2:  [92 %-97 %] 97 %  Body mass index is 47 26 kg/m²  Input and Output Summary (last 24 hours): Intake/Output Summary (Last 24 hours) at 2021 1538  Last data filed at 2021 1300  Gross per 24 hour   Intake 360 ml   Output --   Net 360 ml       Physical Exam:   Physical Exam  Vitals and nursing note reviewed  Constitutional:       Appearance: He is obese  He is ill-appearing  HENT:      Head: Normocephalic and atraumatic  Right Ear: External ear normal       Left Ear: External ear normal       Nose: No congestion or rhinorrhea  Mouth/Throat:      Mouth: Mucous membranes are dry  Pharynx: Oropharynx is clear  Eyes:      General:         Right eye: No discharge  Left eye: No discharge     Cardiovascular:      Rate and Rhythm: Normal rate and regular rhythm  Pulmonary:      Effort: Pulmonary effort is normal  No respiratory distress  Abdominal:      General: Abdomen is flat  Palpations: Abdomen is soft  Musculoskeletal:      Cervical back: Normal range of motion and neck supple  Right lower leg: Edema present  Left lower leg: Edema present  Skin:     General: Skin is warm and dry  Comments: Right medial thigh varicose vein with erythema and induration, no tenderness    Neurological:      General: No focal deficit present  Mental Status: He is alert  Mental status is at baseline     Psychiatric:         Mood and Affect: Mood normal          Behavior: Behavior normal           Additional Data:     Labs:  Results from last 7 days   Lab Units 09/13/21  0609 09/12/21  0957   WBC Thousand/uL 10 08 11 60*   HEMOGLOBIN g/dL 11 7* 12 4   HEMATOCRIT % 33 7* 35 5*   PLATELETS Thousands/uL 175 188   NEUTROS PCT %  --  71   LYMPHS PCT %  --  12*   MONOS PCT %  --  15*   EOS PCT %  --  0     Results from last 7 days   Lab Units 09/13/21  0609 09/12/21  0957   SODIUM mmol/L 136 133*   POTASSIUM mmol/L 3 5 3 5   CHLORIDE mmol/L 101 99*   CO2 mmol/L 24 24   BUN mg/dL 14 15   CREATININE mg/dL 0 82 0 83   ANION GAP mmol/L 11 10   CALCIUM mg/dL 8 0* 8 3   ALBUMIN g/dL  --  3 4*   TOTAL BILIRUBIN mg/dL  --  0 85   ALK PHOS U/L  --  62   ALT U/L  --  27   AST U/L  --  21   GLUCOSE RANDOM mg/dL 109 103     Results from last 7 days   Lab Units 09/12/21  0957   INR  1 10             Results from last 7 days   Lab Units 09/13/21  0609 09/12/21  0957   LACTIC ACID mmol/L  --  0 7   PROCALCITONIN ng/ml 0 10 0 08       Lines/Drains:  Invasive Devices     Peripheral Intravenous Line            Peripheral IV 09/12/21 Distal;Left;Upper;Ventral (anterior) Arm 1 day    Peripheral IV 09/12/21 Right Forearm 1 day                      Imaging: Reviewed radiology reports from this admission including: Venous doppler    Recent Cultures (last 7 days):   Results from last 7 days   Lab Units 09/12/21  0957   BLOOD CULTURE  Received in Microbiology Lab  Culture in Progress  Received in Microbiology Lab  Culture in Progress  Last 24 Hours Medication List:   Current Facility-Administered Medications   Medication Dose Route Frequency Provider Last Rate    acetaminophen  650 mg Oral Q6H PRN Maye Herrera MD      aspirin  81 mg Oral Daily Maye Herrera MD      atorvastatin  40 mg Oral Daily With Joe Delarosa MD      calcium carbonate  1,000 mg Oral Daily PRN Maye Herrera MD      carvedilol  25 mg Oral BID With Meals Maye Herrera MD      cefTRIAXone  2,000 mg Intravenous Q24H Maye Herrera MD 2,000 mg (09/13/21 1102)    clopidogrel  75 mg Oral Daily Maye Herrera MD      docusate sodium  100 mg Oral BID Maye Herrera MD      FLUoxetine  40 mg Oral Daily Maye Herrera MD      furosemide  40 mg Oral Daily Maye Herrera MD      gabapentin  400 mg Oral HS Maye Herrera MD      heparin (porcine)  5,000 Units Subcutaneous [facility] [address] Maye Herrera MD      hydrALAZINE  5 mg Intravenous Q6H PRN Maye Herrera MD      HYDROmorphone  0 5 mg Intravenous Q6H PRN Maye Herrera MD      HYDROmorphone  1 mg Intravenous Q4H PRN Maye Herrera MD      isosorbide mononitrate  60 mg Oral Daily Maye Herrera MD      losartan  100 mg Oral Daily Maye Herrera MD      morphine injection  2 mg Intravenous Q4H PRN Maye Herrera MD      ondansetron  4 mg Intravenous Q6H PRN Maye Herrera MD      oxyCODONE-acetaminophen  1 tablet Oral Q4H PRN Maye Herrera MD      ranolazine  1,000 mg Oral Q12H [address] Maye Herrera MD      senna  1 tablet Oral Daily Maye Herrera MD          Today, Patient Was Seen By: Tye Garcia MD    **Please Note: This note may have been constructed using a voice recognition system  **

## 2021-09-13 NOTE — PLAN OF CARE
Problem: PAIN - ADULT  Goal: Verbalizes/displays adequate comfort level or baseline comfort level  Description: Interventions:  - Encourage patient to monitor pain and request assistance  - Assess pain using appropriate pain scale  - Administer analgesics based on type and severity of pain and evaluate response  - Implement non-pharmacological measures as appropriate and evaluate response  - Consider cultural and social influences on pain and pain management  - Notify physician/advanced practitioner if interventions unsuccessful or patient reports new pain  Outcome: Progressing     Problem: SAFETY ADULT  Goal: Patient will remain free of falls  Description: INTERVENTIONS:  - Educate patient/family on patient safety including physical limitations  - Instruct patient to call for assistance with activity   - Consult OT/PT to assist with strengthening/mobility   - Keep Call bell within reach  - Keep bed low and locked with side rails adjusted as appropriate  - Keep care items and personal belongings within reach  - Initiate and maintain comfort rounds  - Make Fall Risk Sign visible to staff  - Offer Toileting every 2 Hours, in advance of need  - Obtain necessary fall risk management equipment:   - Apply yellow socks and bracelet for high fall risk patients  - Consider moving patient to room near nurses station  Outcome: Progressing

## 2021-09-13 NOTE — ASSESSMENT & PLAN NOTE
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

## 2021-09-13 NOTE — ASSESSMENT & PLAN NOTE
· Multivessel CAD status post CABG in 2011  · Continue aspirin, ranexa, imdur, plavix, crestor equivalent  · Follows with [facility] cardiology

## 2021-09-14 LAB
ANION GAP SERPL CALCULATED.3IONS-SCNC: 11 MMOL/L (ref 4–13)
BASOPHILS # BLD AUTO: 0.04 THOUSANDS/ΜL (ref 0–0.1)
BASOPHILS NFR BLD AUTO: 0 % (ref 0–1)
BUN SERPL-MCNC: 14 MG/DL (ref 5–25)
CALCIUM SERPL-MCNC: 8.1 MG/DL (ref 8.3–10.1)
CHLORIDE SERPL-SCNC: 97 MMOL/L (ref 100–108)
CO2 SERPL-SCNC: 24 MMOL/L (ref 21–32)
CREAT SERPL-MCNC: 0.87 MG/DL (ref 0.6–1.3)
EOSINOPHIL # BLD AUTO: 0 THOUSAND/ΜL (ref 0–0.61)
EOSINOPHIL NFR BLD AUTO: 0 % (ref 0–6)
ERYTHROCYTE [DISTWIDTH] IN BLOOD BY AUTOMATED COUNT: 13.7 % (ref 11.6–15.1)
GFR SERPL CREATININE-BSD FRML MDRD: 90 ML/MIN/1.73SQ M
GLUCOSE SERPL-MCNC: 170 MG/DL (ref 65–140)
HCT VFR BLD AUTO: 33.6 % (ref 36.5–49.3)
HGB BLD-MCNC: 11.9 G/DL (ref 12–17)
IMM GRANULOCYTES # BLD AUTO: 0.06 THOUSAND/UL (ref 0–0.2)
IMM GRANULOCYTES NFR BLD AUTO: 1 % (ref 0–2)
LYMPHOCYTES # BLD AUTO: 0.94 THOUSANDS/ΜL (ref 0.6–4.47)
LYMPHOCYTES NFR BLD AUTO: 10 % (ref 14–44)
MAGNESIUM SERPL-MCNC: 1.9 MG/DL (ref 1.6–2.6)
MCH RBC QN AUTO: 31.3 PG (ref 26.8–34.3)
MCHC RBC AUTO-ENTMCNC: 35.4 G/DL (ref 31.4–37.4)
MCV RBC AUTO: 88 FL (ref 82–98)
MONOCYTES # BLD AUTO: 1.23 THOUSAND/ΜL (ref 0.17–1.22)
MONOCYTES NFR BLD AUTO: 13 % (ref 4–12)
NEUTROPHILS # BLD AUTO: 7.39 THOUSANDS/ΜL (ref 1.85–7.62)
NEUTS SEG NFR BLD AUTO: 76 % (ref 43–75)
NRBC BLD AUTO-RTO: 0 /100 WBCS
PLATELET # BLD AUTO: 200 THOUSANDS/UL (ref 149–390)
PMV BLD AUTO: 10.2 FL (ref 8.9–12.7)
POTASSIUM SERPL-SCNC: 3.1 MMOL/L (ref 3.5–5.3)
RBC # BLD AUTO: 3.8 MILLION/UL (ref 3.88–5.62)
SODIUM SERPL-SCNC: 132 MMOL/L (ref 136–145)
WBC # BLD AUTO: 9.66 THOUSAND/UL (ref 4.31–10.16)

## 2021-09-14 PROCEDURE — 80048 BASIC METABOLIC PNL TOTAL CA: CPT | Performed by: STUDENT IN AN ORGANIZED HEALTH CARE EDUCATION/TRAINING PROGRAM

## 2021-09-14 PROCEDURE — 85025 COMPLETE CBC W/AUTO DIFF WBC: CPT | Performed by: STUDENT IN AN ORGANIZED HEALTH CARE EDUCATION/TRAINING PROGRAM

## 2021-09-14 PROCEDURE — 83735 ASSAY OF MAGNESIUM: CPT | Performed by: STUDENT IN AN ORGANIZED HEALTH CARE EDUCATION/TRAINING PROGRAM

## 2021-09-14 PROCEDURE — 99232 SBSQ HOSP IP/OBS MODERATE 35: CPT | Performed by: INTERNAL MEDICINE

## 2021-09-14 RX ORDER — POTASSIUM CHLORIDE 20 MEQ/1
40 TABLET, EXTENDED RELEASE ORAL ONCE
Status: COMPLETED | OUTPATIENT
Start: 2021-09-14 | End: 2021-09-14

## 2021-09-14 RX ADMIN — ACETAMINOPHEN 650 MG: 325 TABLET, FILM COATED ORAL at 14:55

## 2021-09-14 RX ADMIN — DOCUSATE SODIUM 100 MG: 100 CAPSULE, LIQUID FILLED ORAL at 17:21

## 2021-09-14 RX ADMIN — ACETAMINOPHEN 650 MG: 325 TABLET, FILM COATED ORAL at 08:41

## 2021-09-14 RX ADMIN — RANOLAZINE 1000 MG: 500 TABLET, FILM COATED, EXTENDED RELEASE ORAL at 22:15

## 2021-09-14 RX ADMIN — HEPARIN SODIUM 5000 UNITS: 5000 INJECTION INTRAVENOUS; SUBCUTANEOUS at 14:55

## 2021-09-14 RX ADMIN — HEPARIN SODIUM 5000 UNITS: 5000 INJECTION INTRAVENOUS; SUBCUTANEOUS at 05:09

## 2021-09-14 RX ADMIN — CEFTRIAXONE 2000 MG: 2 INJECTION, POWDER, FOR SOLUTION INTRAMUSCULAR; INTRAVENOUS at 11:42

## 2021-09-14 RX ADMIN — ISOSORBIDE MONONITRATE 60 MG: 60 TABLET, EXTENDED RELEASE ORAL at 08:29

## 2021-09-14 RX ADMIN — HYDROMORPHONE HYDROCHLORIDE 1 MG: 1 INJECTION, SOLUTION INTRAMUSCULAR; INTRAVENOUS; SUBCUTANEOUS at 22:15

## 2021-09-14 RX ADMIN — POTASSIUM CHLORIDE 40 MEQ: 1500 TABLET, EXTENDED RELEASE ORAL at 11:42

## 2021-09-14 RX ADMIN — CARVEDILOL 25 MG: 12.5 TABLET, FILM COATED ORAL at 08:29

## 2021-09-14 RX ADMIN — GABAPENTIN 400 MG: 400 CAPSULE ORAL at 22:15

## 2021-09-14 RX ADMIN — RANOLAZINE 1000 MG: 500 TABLET, FILM COATED, EXTENDED RELEASE ORAL at 08:28

## 2021-09-14 RX ADMIN — FLUOXETINE HYDROCHLORIDE 40 MG: 10 CAPSULE ORAL at 08:30

## 2021-09-14 RX ADMIN — LOSARTAN POTASSIUM 100 MG: 50 TABLET, FILM COATED ORAL at 08:28

## 2021-09-14 RX ADMIN — STANDARDIZED SENNA CONCENTRATE 8.6 MG: 8.6 TABLET ORAL at 08:29

## 2021-09-14 RX ADMIN — ATORVASTATIN CALCIUM 40 MG: 40 TABLET, FILM COATED ORAL at 17:21

## 2021-09-14 RX ADMIN — FUROSEMIDE 40 MG: 40 TABLET ORAL at 08:29

## 2021-09-14 RX ADMIN — CLOPIDOGREL BISULFATE 75 MG: 75 TABLET ORAL at 08:28

## 2021-09-14 RX ADMIN — ASPIRIN 81 MG: 81 TABLET, COATED ORAL at 08:28

## 2021-09-14 RX ADMIN — CARVEDILOL 25 MG: 12.5 TABLET, FILM COATED ORAL at 17:21

## 2021-09-14 RX ADMIN — DOCUSATE SODIUM 100 MG: 100 CAPSULE, LIQUID FILLED ORAL at 08:28

## 2021-09-14 NOTE — PROGRESS NOTES
[facility]  Progress Note - Himanshu Suazo 1955, 77 y o  male MRN: [id number]  Unit/Bed#: -01 Encounter: [id number]  Primary Care Provider: No primary care provider on file     Date and time admitted to hospital: 9/12/2021  9:39 AM    * Community acquired pneumonia of right upper lobe of lung  Assessment & Plan  · Confirmed by CT imaging  · Could be viral etiology given procalcitonin negative x2  · Covid 19 negative  · Blood culture x2 negative  · Does have ground glass infiltrates, spiking fevers despite antibiotic initiation  · Has severe fatigue, consider rechecking covid 19 vs treating as presumed + if symptoms   · Will likely recheck COVID tomorrow if no improvement in symptoms  · Continue IV antibiotics for now, still having low grade fevers  · Follow up infectious work up     Hypertension  Assessment & Plan  · Poorly controlled on admission, now improved   · Continue home dose coreg, losartan, prn hydralazine     Varicose veins of right lower extremity  Assessment & Plan  · Initially started on heparin gtt for concern of PE, later corrected by radiology as pneumonia  · Heparin gtt discontinued, but patient has indurated varicose vein in right thigh  · 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  · Venous duplex negative for dvt   · Follow up with vascular surgery as an outpatient     Chronic diastolic congestive heart failure ([facility] [address] )  Assessment & Plan  Wt Readings from Last 3 Encounters:   09/13/21 (!) 145 kg (320 lb)   02/24/14 (!) 147 kg (324 lb 4 oz)   12/13/13 (!) 144 kg (318 lb 4 9 oz)     · Last ECHO in 2020 with preserved EF  · Continue home dose lasix, monitor I/O, lytes, weight  · Does have lower extremity edema but reports baseline     SIRS (systemic inflammatory response syndrome) ([facility] [address] )  Assessment & Plan  · Secondary to pneumonia, see above    Coronary artery disease  Assessment & Plan  · Multivessel CAD status post CABG in 2011  · Continue aspirin, ranexa, imdur, plavix, crestor equivalent  · [facility]         VTE Pharmacologic Prophylaxis: VTE Score: 5 High Risk (Score >/= 5) - Pharmacological DVT Prophylaxis Ordered: heparin  Sequential Compression Devices Ordered  Patient Centered Rounds: I performed bedside rounds with nursing staff today  Discussions with Specialists or Other Care Team Provider: none    Education and Discussions with Family / Patient: Updated  (wife) via phone  Time Spent for Care: 30 minutes  More than 50% of total time spent on counseling and coordination of care as described above  Current Length of Stay: 2 day(s)  Current Patient Status: Inpatient   Certification Statement: The patient will continue to require additional inpatient hospital stay due to pneumonia  Discharge Plan: Anticipate discharge in 24-48 hrs to home  Code Status: Level 1 - Full Code    Subjective:   Patient resting comfortably on exam without any acute complaints at this time  Objective:     Vitals:   Temp (24hrs), Av 4 °F (37 4 °C), Min:98 6 °F (37 °C), Max:100 2 °F (37 9 °C)    Temp:  [98 6 °F (37 °C)-100 2 °F (37 9 °C)] 100 2 °F (37 9 °C)  HR:  [67-76] 67  Resp:  [18-24] 18  BP: (119-162)/(65-87) 119/65  SpO2:  [85 %-99 %] 94 %  Body mass index is 47 26 kg/m²  Input and Output Summary (last 24 hours): Intake/Output Summary (Last 24 hours) at 2021 1713  Last data filed at 2021 1318  Gross per 24 hour   Intake 700 ml   Output --   Net 700 ml       Physical Exam:   Physical Exam  Vitals and nursing note reviewed  Constitutional:       General: He is not in acute distress  Appearance: He is well-developed  Comments: 2L NC   HENT:      Head: Normocephalic and atraumatic  Eyes:      General: No scleral icterus  Conjunctiva/sclera: Conjunctivae normal    Cardiovascular:      Rate and Rhythm: Normal rate and regular rhythm  Heart sounds: Normal heart sounds  No murmur heard  No friction rub  No gallop  Pulmonary:      Effort: Pulmonary effort is normal  No respiratory distress  Breath sounds: Normal breath sounds  No wheezing or rales  Abdominal:      General: Bowel sounds are normal  There is no distension  Palpations: Abdomen is soft  Tenderness: There is no abdominal tenderness  Musculoskeletal:         General: Normal range of motion  Skin:     General: Skin is warm  Findings: No rash  Neurological:      Mental Status: He is alert and oriented to person, place, and time  Additional Data:     Labs:  Results from last 7 days   Lab Units 09/14/21  0507   WBC Thousand/uL 9 66   HEMOGLOBIN g/dL 11 9*   HEMATOCRIT % 33 6*   PLATELETS Thousands/uL 200   NEUTROS PCT % 76*   LYMPHS PCT % 10*   MONOS PCT % 13*   EOS PCT % 0     Results from last 7 days   Lab Units 09/14/21  0507 09/12/21  0957   SODIUM mmol/L 132* 133*   POTASSIUM mmol/L 3 1* 3 5   CHLORIDE mmol/L 97* 99*   CO2 mmol/L 24 24   BUN mg/dL 14 15   CREATININE mg/dL 0 87 0 83   ANION GAP mmol/L 11 10   CALCIUM mg/dL 8 1* 8 3   ALBUMIN g/dL  --  3 4*   TOTAL BILIRUBIN mg/dL  --  0 85   ALK PHOS U/L  --  62   ALT U/L  --  27   AST U/L  --  21   GLUCOSE RANDOM mg/dL 170* 103     Results from last 7 days   Lab Units 09/12/21  0957   INR  1 10             Results from last 7 days   Lab Units 09/13/21  0609 09/12/21  0957   LACTIC ACID mmol/L  --  0 7   PROCALCITONIN ng/ml 0 10 0 08       Lines/Drains:  Invasive Devices     Peripheral Intravenous Line            Peripheral IV 09/12/21 Distal;Left;Upper;Ventral (anterior) Arm 2 days    Peripheral IV 09/12/21 Right Forearm 2 days                      Imaging: No pertinent imaging reviewed  Recent Cultures (last 7 days):   Results from last 7 days   Lab Units 09/12/21  0957   BLOOD CULTURE  No Growth at 24 hrs  No Growth at 24 hrs         Last 24 Hours Medication List:   Current Facility-Administered Medications   Medication Dose Route Frequency Provider Last Rate  acetaminophen  650 mg Oral Q6H PRN Geeta Kerr MD      aspirin  81 mg Oral Daily Geeta Kerr MD      atorvastatin  40 mg Oral Daily With Kevin Horvath MD      calcium carbonate  1,000 mg Oral Daily PRN Geeta Kerr MD      carvedilol  25 mg Oral BID With Meals Geeta Kerr MD      cefTRIAXone  2,000 mg Intravenous Q24H Geeta Kerr MD 2,000 mg (09/14/21 1142)    clopidogrel  75 mg Oral Daily Geeta Kerr MD      docusate sodium  100 mg Oral BID Geeta Kerr MD      FLUoxetine  40 mg Oral Daily Geeta Kerr MD      furosemide  40 mg Oral Daily Geeta Kerr MD      gabapentin  400 mg Oral HS Geeta Kerr MD      heparin (porcine)  5,000 Units Subcutaneous [facility] [address] Geeta Kerr MD      hydrALAZINE  5 mg Intravenous Q6H PRN Geeta Kerr MD      HYDROmorphone  0 5 mg Intravenous Q6H PRN Geeta Kerr MD      HYDROmorphone  1 mg Intravenous Q4H PRN Geeta Kerr MD      isosorbide mononitrate  60 mg Oral Daily Geeta Kerr MD      losartan  100 mg Oral Daily Geeta Kerr MD      morphine injection  2 mg Intravenous Q4H PRN Geeta Kerr MD      ondansetron  4 mg Intravenous Q6H PRN Geeta Kerr MD      oxyCODONE-acetaminophen  1 tablet Oral Q4H PRN Geeta Kerr MD      ranolazine  1,000 mg Oral Q12H [address] Geeta Kerr MD      senna  1 tablet Oral Daily Geeta Kerr MD          Today, Patient Was Seen By: Jessica Worley DO    **Please Note: This note may have been constructed using a voice recognition system  **

## 2021-09-14 NOTE — ASSESSMENT & PLAN NOTE
· Confirmed by CT imaging  · Could be viral etiology given procalcitonin negative x2  · Covid 19 negative  · Blood culture x2 negative  · Does have ground glass infiltrates, spiking fevers despite antibiotic initiation  · Has severe fatigue, consider rechecking covid 19 vs treating as presumed + if symptoms   · Will likely recheck COVID tomorrow if no improvement in symptoms  · Continue IV antibiotics for now, still having low grade fevers  · Follow up infectious work up

## 2021-09-14 NOTE — ASSESSMENT & PLAN NOTE
· Multivessel CAD status post CABG in 2011  · Continue aspirin, ranexa, imdur, plavix, crestor equivalent  · Follows with [facility] cardiology

## 2021-09-14 NOTE — CASE MANAGEMENT
Case Management Discharge Planning Note    Patient name Princess Flores  Location /-21 MRN [id number]  : 1955 Date 2021       Current Admission Date: 2021  Current Admission Diagnosis:  Community acquired pneumonia of right upper lobe of lung  Previous Admission - Discharge Date:    LOS (days): 2  Geometric Mean LOS (GMLOS) (days): 3 10  Days to GMLOS:1 1 Previous Discharge Diagnosis:  No discharge information exists for this patient  OBJECTIVE:  Risk of Unplanned Readmission Score: 13   Bundle(if applicable):    Current admission status: Inpatient  Preferred Pharmacy:   [id number] [place] [facility] [address] [facility] [place] [address]  Phone: [phone] Fax: [phone]    Primary Care Provider: No primary care provider on file  Primary Insurance: [facility]  Secondary Insurance: MEDICARE    DISCHARGE DETAILS:    Requested [address] [facility] requested[de-identified] Physical Therapy, Occupational Therapy, [facility]  Agency Name[de-identified] [address] [place] [address] Provider[de-identified] PCP  Home Health Services Needed[de-identified] Evaluate Functional Status and Safety, Gait/ADL Training, Strengthening/Theraputic Exercises to Improve Function  Homebound Criteria Met[de-identified] Uses an Assist Device (i e  cane, walker, etc)  Supporting Clincal Findings[de-identified] Fatigues Easliy in [employer], [employer]    DME Referral Provided  Referral made for DME?: No    We would like to be able to fill any required prescriptions on discharge at our [address] [place] [address] and have them delivered to you at discharge in your room    Would you like to participate in this program? : No - Declined    Discharge Destination Plan[de-identified] Home

## 2021-09-14 NOTE — UTILIZATION REVIEW
Continued Stay Review    Date: 9/14                        Current Patient Class: IP   Current Level of Care: MS     HPI:66 y o  male initially admitted on 9/12 for abd pain , chills   Admitted w/ pneumonia     Assessment/Plan: pneumonia cont iv abx , still having low grade fevers   F/u infectious workup   Cont lasix , monitor I&o and weights   + LE edema        Vital Signs:   09/14/21 0837  --  --  --  --  --  --  28  2 L/min  Nasal cannula  --  --   09/14/21 0832  --  76  --  --  --  99 %  28  2 L/min  Nasal cannula  --  --   09/14/21 07:01:18  98 6 °F (37 °C)  --  18  158/75  103  --                   Pertinent Labs/Diagnostic Results:   Results from last 7 days   Lab Units 09/12/21  0957   SARS-COV-2  Negative     Results from last 7 days   Lab Units 09/14/21  0507 09/13/21  0609 09/12/21  0957   WBC Thousand/uL 9 66 10 08 11 60*   HEMOGLOBIN g/dL 11 9* 11 7* 12 4   HEMATOCRIT % 33 6* 33 7* 35 5*   PLATELETS Thousands/uL 200 175 188   NEUTROS ABS Thousands/µL 7 39  --  8 38*     Results from last 7 days   Lab Units 09/14/21  0507 09/13/21  0609 09/12/21  0957   SODIUM mmol/L 132* 136 133*   POTASSIUM mmol/L 3 1* 3 5 3 5   CHLORIDE mmol/L 97* 101 99*   CO2 mmol/L 24 24 24   ANION GAP mmol/L 11 11 10   BUN mg/dL 14 14 15   CREATININE mg/dL 0 87 0 82 0 83   EGFR ml/min/1 73sq m 90 92 92   CALCIUM mg/dL 8 1* 8 0* 8 3   MAGNESIUM mg/dL 1 9 1 9  --      Results from last 7 days   Lab Units 09/12/21  0957   AST U/L 21   ALT U/L 27   ALK PHOS U/L 62   TOTAL PROTEIN g/dL 7 6   ALBUMIN g/dL 3 4*   TOTAL BILIRUBIN mg/dL 0 85     Results from last 7 days   Lab Units 09/14/21  0507 09/13/21  0609 09/12/21  0957   GLUCOSE RANDOM mg/dL 170* 109 103     Results from last 7 days   Lab Units 09/12/21  0957   TROPONIN I ng/mL <0 02     Results from last 7 days   Lab Units 09/12/21  1229 09/12/21  0957   PROTIME seconds  --  14 4   INR   --  1 10   PTT seconds 37 40*     Results from last 7 days   Lab Units 09/13/21  0609 09/12/21 0957   PROCALCITONIN ng/ml 0 10 0 08     Results from last 7 days   Lab Units 09/12/21  0957   LACTIC ACID mmol/L 0 7     Results from last 7 days   Lab Units 09/12/21  0957   LIPASE u/L 55*     Results from last 7 days   Lab Units 09/12/21  1155   CLARITY UA  Clear   COLOR UA  Yellow   SPEC GRAV UA  <=1 005   PH UA  6 5   GLUCOSE UA mg/dl Negative   KETONES UA mg/dl 15 (1+)*   BLOOD UA  Negative   PROTEIN UA mg/dl Negative   NITRITE UA  Negative   BILIRUBIN UA  Negative   UROBILINOGEN UA E U /dl 1 0   LEUKOCYTES UA  Negative     Results from last 7 days   Lab Units 09/12/21 0957   INFLUENZA A PCR  Negative   INFLUENZA B PCR  Negative   RSV PCR  Negative     Results from last 7 days   Lab Units 09/12/21 0957   BLOOD CULTURE  No Growth at 24 hrs  No Growth at 24 hrs         Medications:   Scheduled Medications:  aspirin, 81 mg, Oral, Daily  atorvastatin, 40 mg, Oral, Daily With Dinner  carvedilol, 25 mg, Oral, BID With Meals  cefTRIAXone, 2,000 mg, Intravenous, Q24H  clopidogrel, 75 mg, Oral, Daily  docusate sodium, 100 mg, Oral, BID  FLUoxetine, 40 mg, Oral, Daily  furosemide, 40 mg, Oral, Daily  gabapentin, 400 mg, Oral, HS  heparin (porcine), 5,000 Units, Subcutaneous, Q8H JAK  isosorbide mononitrate, 60 mg, Oral, Daily  losartan, 100 mg, Oral, Daily  ranolazine, 1,000 mg, Oral, Q12H JAK  senna, 1 tablet, Oral, Daily      Continuous IV Infusions:     PRN Meds:  acetaminophen, 650 mg, Oral, Q6H PRN  calcium carbonate, 1,000 mg, Oral, Daily PRN  hydrALAZINE, 5 mg, Intravenous, Q6H PRN  HYDROmorphone, 0 5 mg, Intravenous, Q6H PRN  HYDROmorphone, 1 mg, Intravenous, Q4H PRN  9/13  x1  9/14  x1  morphine injection, 2 mg, Intravenous, Q4H PRN  ondansetron, 4 mg, Intravenous, Q6H PRN  oxyCODONE-acetaminophen, 1 tablet, Oral, Q4H PRN        Discharge Plan: D     Network Utilization Review Department  ATTENTION: Please call with any questions or concerns to [phone] and carefully listen to the prompts so that you are directed to the right person  All voicemails are confidential   Eliebetzy Pringley all requests for admission clinical reviews, approved or denied determinations and any other requests to dedicated fax number below belonging to the campus where the patient is receiving treatment   List of dedicated fax numbers for the Facilities:  [address] DENIALS (Administrative/Medical Necessity) [phone]   [address] (Maternity/NICU/Pediatrics) [phone]   [address] Briana Silvael Rina [address] [place] [address] [address] [facility] [place] [address] [phone]

## 2021-09-14 NOTE — ASSESSMENT & PLAN NOTE
Wt Readings from Last 3 Encounters:   09/13/21 (!) 145 kg (320 lb)   02/24/14 (!) 147 kg (324 lb 4 oz)   12/13/13 (!) 144 kg (318 lb 4 9 oz)     · Last ECHO in 2020 with preserved EF  · Continue home dose lasix, monitor I/O, lytes, weight  · Does have lower extremity edema but reports baseline

## 2021-09-15 PROBLEM — R50.9 FEVER: Status: ACTIVE | Noted: 2021-09-12

## 2021-09-15 PROBLEM — N28.9 RENAL LESION: Status: ACTIVE | Noted: 2021-09-15

## 2021-09-15 LAB
ALBUMIN SERPL BCP-MCNC: 2.6 G/DL (ref 3.5–5)
ALP SERPL-CCNC: 67 U/L (ref 46–116)
ALT SERPL W P-5'-P-CCNC: 67 U/L (ref 12–78)
ANION GAP SERPL CALCULATED.3IONS-SCNC: 10 MMOL/L (ref 4–13)
AST SERPL W P-5'-P-CCNC: 59 U/L (ref 5–45)
BASOPHILS # BLD AUTO: 0.03 THOUSANDS/ΜL (ref 0–0.1)
BASOPHILS NFR BLD AUTO: 0 % (ref 0–1)
BILIRUB DIRECT SERPL-MCNC: 0.3 MG/DL (ref 0–0.2)
BILIRUB SERPL-MCNC: 0.69 MG/DL (ref 0.2–1)
BUN SERPL-MCNC: 16 MG/DL (ref 5–25)
CALCIUM SERPL-MCNC: 8.3 MG/DL (ref 8.3–10.1)
CHLORIDE SERPL-SCNC: 99 MMOL/L (ref 100–108)
CO2 SERPL-SCNC: 24 MMOL/L (ref 21–32)
CREAT SERPL-MCNC: 0.92 MG/DL (ref 0.6–1.3)
EOSINOPHIL # BLD AUTO: 0.02 THOUSAND/ΜL (ref 0–0.61)
EOSINOPHIL NFR BLD AUTO: 0 % (ref 0–6)
ERYTHROCYTE [DISTWIDTH] IN BLOOD BY AUTOMATED COUNT: 14.1 % (ref 11.6–15.1)
GFR SERPL CREATININE-BSD FRML MDRD: 86 ML/MIN/1.73SQ M
GLUCOSE SERPL-MCNC: 113 MG/DL (ref 65–140)
HCT VFR BLD AUTO: 32.6 % (ref 36.5–49.3)
HGB BLD-MCNC: 11.4 G/DL (ref 12–17)
IMM GRANULOCYTES # BLD AUTO: 0.06 THOUSAND/UL (ref 0–0.2)
IMM GRANULOCYTES NFR BLD AUTO: 1 % (ref 0–2)
LYMPHOCYTES # BLD AUTO: 1.03 THOUSANDS/ΜL (ref 0.6–4.47)
LYMPHOCYTES NFR BLD AUTO: 11 % (ref 14–44)
MAGNESIUM SERPL-MCNC: 2.1 MG/DL (ref 1.6–2.6)
MCH RBC QN AUTO: 31 PG (ref 26.8–34.3)
MCHC RBC AUTO-ENTMCNC: 35 G/DL (ref 31.4–37.4)
MCV RBC AUTO: 89 FL (ref 82–98)
MONOCYTES # BLD AUTO: 1.5 THOUSAND/ΜL (ref 0.17–1.22)
MONOCYTES NFR BLD AUTO: 16 % (ref 4–12)
NEUTROPHILS # BLD AUTO: 7 THOUSANDS/ΜL (ref 1.85–7.62)
NEUTS SEG NFR BLD AUTO: 72 % (ref 43–75)
NRBC BLD AUTO-RTO: 0 /100 WBCS
PLATELET # BLD AUTO: 208 THOUSANDS/UL (ref 149–390)
PMV BLD AUTO: 9.9 FL (ref 8.9–12.7)
POTASSIUM SERPL-SCNC: 3.8 MMOL/L (ref 3.5–5.3)
PROT SERPL-MCNC: 7.1 G/DL (ref 6.4–8.2)
RBC # BLD AUTO: 3.68 MILLION/UL (ref 3.88–5.62)
SARS-COV-2 RNA RESP QL NAA+PROBE: NEGATIVE
SODIUM SERPL-SCNC: 133 MMOL/L (ref 136–145)
WBC # BLD AUTO: 9.64 THOUSAND/UL (ref 4.31–10.16)

## 2021-09-15 PROCEDURE — 80048 BASIC METABOLIC PNL TOTAL CA: CPT | Performed by: STUDENT IN AN ORGANIZED HEALTH CARE EDUCATION/TRAINING PROGRAM

## 2021-09-15 PROCEDURE — 99232 SBSQ HOSP IP/OBS MODERATE 35: CPT | Performed by: INTERNAL MEDICINE

## 2021-09-15 PROCEDURE — 80076 HEPATIC FUNCTION PANEL: CPT | Performed by: INTERNAL MEDICINE

## 2021-09-15 PROCEDURE — 94660 CPAP INITIATION&MGMT: CPT

## 2021-09-15 PROCEDURE — 83735 ASSAY OF MAGNESIUM: CPT | Performed by: STUDENT IN AN ORGANIZED HEALTH CARE EDUCATION/TRAINING PROGRAM

## 2021-09-15 PROCEDURE — 94760 N-INVAS EAR/PLS OXIMETRY 1: CPT

## 2021-09-15 PROCEDURE — U0005 INFEC AGEN DETEC AMPLI PROBE: HCPCS | Performed by: INTERNAL MEDICINE

## 2021-09-15 PROCEDURE — 85025 COMPLETE CBC W/AUTO DIFF WBC: CPT | Performed by: STUDENT IN AN ORGANIZED HEALTH CARE EDUCATION/TRAINING PROGRAM

## 2021-09-15 PROCEDURE — U0003 INFECTIOUS AGENT DETECTION BY NUCLEIC ACID (DNA OR RNA); SEVERE ACUTE RESPIRATORY SYNDROME CORONAVIRUS 2 (SARS-COV-2) (CORONAVIRUS DISEASE [COVID-19]), AMPLIFIED PROBE TECHNIQUE, MAKING USE OF HIGH THROUGHPUT TECHNOLOGIES AS DESCRIBED BY CMS-2020-01-R: HCPCS | Performed by: INTERNAL MEDICINE

## 2021-09-15 RX ORDER — KETOROLAC TROMETHAMINE 30 MG/ML
15 INJECTION, SOLUTION INTRAMUSCULAR; INTRAVENOUS EVERY 6 HOURS PRN
Status: DISPENSED | OUTPATIENT
Start: 2021-09-15 | End: 2021-09-17

## 2021-09-15 RX ORDER — OXYCODONE HYDROCHLORIDE 5 MG/1
2.5 TABLET ORAL EVERY 4 HOURS PRN
Status: DISCONTINUED | OUTPATIENT
Start: 2021-09-15 | End: 2021-09-19 | Stop reason: HOSPADM

## 2021-09-15 RX ORDER — GUAIFENESIN 100 MG/5ML
200 SOLUTION ORAL EVERY 4 HOURS PRN
Status: DISCONTINUED | OUTPATIENT
Start: 2021-09-15 | End: 2021-09-19 | Stop reason: HOSPADM

## 2021-09-15 RX ORDER — ECHINACEA PURPUREA EXTRACT 125 MG
1 TABLET ORAL
Status: DISCONTINUED | OUTPATIENT
Start: 2021-09-15 | End: 2021-09-19 | Stop reason: HOSPADM

## 2021-09-15 RX ADMIN — ACETAMINOPHEN 650 MG: 325 TABLET, FILM COATED ORAL at 00:03

## 2021-09-15 RX ADMIN — RANOLAZINE 1000 MG: 500 TABLET, FILM COATED, EXTENDED RELEASE ORAL at 08:33

## 2021-09-15 RX ADMIN — GABAPENTIN 400 MG: 400 CAPSULE ORAL at 21:50

## 2021-09-15 RX ADMIN — OXYCODONE HYDROCHLORIDE 2.5 MG: 5 TABLET ORAL at 13:46

## 2021-09-15 RX ADMIN — ACETAMINOPHEN 650 MG: 325 TABLET, FILM COATED ORAL at 16:25

## 2021-09-15 RX ADMIN — LOSARTAN POTASSIUM 100 MG: 50 TABLET, FILM COATED ORAL at 08:33

## 2021-09-15 RX ADMIN — RANOLAZINE 1000 MG: 500 TABLET, FILM COATED, EXTENDED RELEASE ORAL at 21:33

## 2021-09-15 RX ADMIN — CARVEDILOL 25 MG: 12.5 TABLET, FILM COATED ORAL at 16:25

## 2021-09-15 RX ADMIN — CARVEDILOL 25 MG: 12.5 TABLET, FILM COATED ORAL at 07:37

## 2021-09-15 RX ADMIN — FLUOXETINE HYDROCHLORIDE 40 MG: 10 CAPSULE ORAL at 08:34

## 2021-09-15 RX ADMIN — CLOPIDOGREL BISULFATE 75 MG: 75 TABLET ORAL at 08:33

## 2021-09-15 RX ADMIN — ATORVASTATIN CALCIUM 40 MG: 40 TABLET, FILM COATED ORAL at 16:26

## 2021-09-15 RX ADMIN — FUROSEMIDE 40 MG: 40 TABLET ORAL at 08:33

## 2021-09-15 RX ADMIN — KETOROLAC TROMETHAMINE 15 MG: 30 INJECTION, SOLUTION INTRAMUSCULAR; INTRAVENOUS at 21:33

## 2021-09-15 RX ADMIN — ASPIRIN 81 MG: 81 TABLET, COATED ORAL at 08:33

## 2021-09-15 RX ADMIN — ACETAMINOPHEN 650 MG: 325 TABLET, FILM COATED ORAL at 06:35

## 2021-09-15 RX ADMIN — HEPARIN SODIUM 5000 UNITS: 5000 INJECTION INTRAVENOUS; SUBCUTANEOUS at 14:50

## 2021-09-15 RX ADMIN — STANDARDIZED SENNA CONCENTRATE 8.6 MG: 8.6 TABLET ORAL at 08:33

## 2021-09-15 RX ADMIN — KETOROLAC TROMETHAMINE 15 MG: 30 INJECTION, SOLUTION INTRAMUSCULAR; INTRAVENOUS at 12:02

## 2021-09-15 RX ADMIN — DOCUSATE SODIUM 100 MG: 100 CAPSULE, LIQUID FILLED ORAL at 08:33

## 2021-09-15 RX ADMIN — ISOSORBIDE MONONITRATE 60 MG: 60 TABLET, EXTENDED RELEASE ORAL at 08:33

## 2021-09-15 RX ADMIN — GUAIFENESIN 200 MG: 100 SOLUTION ORAL at 12:02

## 2021-09-15 RX ADMIN — GUAIFENESIN 200 MG: 100 SOLUTION ORAL at 21:34

## 2021-09-15 RX ADMIN — DOCUSATE SODIUM 100 MG: 100 CAPSULE, LIQUID FILLED ORAL at 16:26

## 2021-09-15 NOTE — ASSESSMENT & PLAN NOTE
· Unclear etiology at this time; as procalcitonin has been negative x2 unlikely to have bacterial pneumonia currently, but may be secondary to viral pneumonia

## 2021-09-15 NOTE — ASSESSMENT & PLAN NOTE
· Multivessel CAD status post CABG in 2011  · Continue aspirin, ranexa, imdur, plavix, crestor equivalent  · Follows with [facility] cardiology

## 2021-09-15 NOTE — PROGRESS NOTES
[facility]  Progress Note - Claude Quezada 1955, 77 y o  male MRN: [id number]  Unit/Bed#: -01 Encounter: [id number]  Primary Care Provider: No primary care provider on file     Date and time admitted to hospital: 9/12/2021  9:39 AM    * Fever  Assessment & Plan  · CT lung- Peripheral consolidation in right upper lobe with surrounding ground-glass; no pulmonary embolisms noted on imaging  · Could be viral etiology given procalcitonin negative x2  · Covid 19 negative x2  · Blood culture x2 negative to date  · Does have ground glass infiltrates, spiking fevers despite antibiotic initiation  · Will discontinue antibiotics at this time given procalcitonin negative x2  · Infectious workup negative thus far  · Will have pulmonology see patient tomorrow    Renal lesion  Assessment & Plan  · Renal lesion noted on CT abdomen pelvis  · Patient will need follow-up with outpatient MRI    Hypertension  Assessment & Plan  · Well controlled currently  · Continue home dose coreg, losartan, prn hydralazine     Varicose veins of right lower extremity  Assessment & Plan  · Initially started on heparin gtt for concern of PE, later corrected by radiology as pneumonia  · Heparin gtt discontinued, but patient has indurated varicose vein in right thigh  · 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  · Venous duplex negative for dvt   · Follow up with vascular surgery as an outpatient     Chronic diastolic congestive heart failure ([facility] [address] )  Assessment & Plan  Wt Readings from Last 3 Encounters:   09/13/21 (!) 145 kg (320 lb)   02/24/14 (!) 147 kg (324 lb 4 oz)   12/13/13 (!) 144 kg (318 lb 4 9 oz)     · Last ECHO in 2020 with preserved EF  · Continue home dose lasix, monitor I/O, lytes, weight  · Does have lower extremity edema but reports baseline     SIRS (systemic inflammatory response syndrome) ([facility] [address] )  Assessment & Plan  · Unclear etiology at this time; as procalcitonin has been negative x2 unlikely to have bacterial pneumonia currently, but may be secondary to viral pneumonia    Coronary artery disease  Assessment & Plan  · Multivessel CAD status post CABG in 2011  · Continue aspirin, ranexa, imdur, plavix, crestor equivalent  · Follows with [facility]ology         VTE Pharmacologic Prophylaxis: VTE Score: 5 High Risk (Score >/= 5) - Pharmacological DVT Prophylaxis Ordered: heparin  Sequential Compression Devices Ordered  Patient Centered Rounds: I performed bedside rounds with nursing staff today  Discussions with Specialists or Other Care Team Provider: none    Education and Discussions with Family / Patient: Updated  (wife) at bedside  Time Spent for Care: 30 minutes  More than 50% of total time spent on counseling and coordination of care as described above  Current Length of Stay: 3 day(s)  Current Patient Status: Inpatient   Certification Statement: The patient will continue to require additional inpatient hospital stay due to SOB and fevers  Discharge Plan: Anticipate discharge in 48-72 hrs to home  Code Status: Level 1 - Full Code    Subjective:   Patient in slight distress on exam   Patient states that breathing is similar compared to yesterday  Objective:     Vitals:   Temp (24hrs), Av 3 °F (38 5 °C), Min:99 4 °F (37 4 °C), Max:102 4 °F (39 1 °C)    Temp:  [99 4 °F (37 4 °C)-102 4 °F (39 1 °C)] 102 °F (38 9 °C)  HR:  [67] 67  Resp:  [18-19] 19  BP: (119-127)/(64-65) 127/64  SpO2:  [93 %-94 %] 93 %  Body mass index is 47 26 kg/m²  Input and Output Summary (last 24 hours): Intake/Output Summary (Last 24 hours) at 9/15/2021 1509  Last data filed at 9/15/2021 1332  Gross per 24 hour   Intake 580 ml   Output --   Net 580 ml       Physical Exam:   Physical Exam  Vitals and nursing note reviewed  Constitutional:       General: He is not in acute distress  Appearance: He is well-developed  Comments: 2L NC   HENT:      Head: Normocephalic and atraumatic     Eyes: General: No scleral icterus  Conjunctiva/sclera: Conjunctivae normal    Cardiovascular:      Rate and Rhythm: Normal rate and regular rhythm  Heart sounds: Normal heart sounds  No murmur heard  No friction rub  No gallop  Pulmonary:      Effort: Pulmonary effort is normal  No respiratory distress  Breath sounds: Normal breath sounds  No wheezing or rales  Abdominal:      General: Bowel sounds are normal  There is no distension  Palpations: Abdomen is soft  Tenderness: There is no abdominal tenderness  Musculoskeletal:         General: Normal range of motion  Skin:     General: Skin is warm  Findings: No rash  Neurological:      Mental Status: He is alert and oriented to person, place, and time  Additional Data:     Labs:  Results from last 7 days   Lab Units 09/15/21  0635   WBC Thousand/uL 9 64   HEMOGLOBIN g/dL 11 4*   HEMATOCRIT % 32 6*   PLATELETS Thousands/uL 208   NEUTROS PCT % 72   LYMPHS PCT % 11*   MONOS PCT % 16*   EOS PCT % 0     Results from last 7 days   Lab Units 09/15/21  0635   SODIUM mmol/L 133*   POTASSIUM mmol/L 3 8   CHLORIDE mmol/L 99*   CO2 mmol/L 24   BUN mg/dL 16   CREATININE mg/dL 0 92   ANION GAP mmol/L 10   CALCIUM mg/dL 8 3   ALBUMIN g/dL 2 6*   TOTAL BILIRUBIN mg/dL 0 69   ALK PHOS U/L 67   ALT U/L 67   AST U/L 59*   GLUCOSE RANDOM mg/dL 113     Results from last 7 days   Lab Units 09/12/21  0957   INR  1 10             Results from last 7 days   Lab Units 09/13/21  0609 09/12/21  0957   LACTIC ACID mmol/L  --  0 7   PROCALCITONIN ng/ml 0 10 0 08       Lines/Drains:  Invasive Devices     Peripheral Intravenous Line            Peripheral IV 09/12/21 Distal;Left;Upper;Ventral (anterior) Arm 3 days    Peripheral IV 09/12/21 Right Forearm 3 days                      Imaging: No pertinent imaging reviewed  Recent Cultures (last 7 days):   Results from last 7 days   Lab Units 09/12/21  0957   BLOOD CULTURE  No Growth at 48 hrs    No Growth at 48 hrs  Last 24 Hours Medication List:   Current Facility-Administered Medications   Medication Dose Route Frequency Provider Last Rate    acetaminophen  650 mg Oral Q6H PRN Kike Campos MD      aspirin  81 mg Oral Daily Kike Campos MD      atorvastatin  40 mg Oral Daily With Vargas Cavanaugh MD      calcium carbonate  1,000 mg Oral Daily PRN Kike Campos MD      carvedilol  25 mg Oral BID With Meals Kike Campos MD      clopidogrel  75 mg Oral Daily Kike Campos MD      docusate sodium  100 mg Oral BID Kike Campos MD      FLUoxetine  40 mg Oral Daily Kike Campos MD      furosemide  40 mg Oral Daily Kike Campos MD      gabapentin  400 mg Oral HS Kike Campos MD      guaiFENesin  200 mg Oral Q4H PRN Lashanda Zafar DO      heparin (porcine)  5,000 Units Subcutaneous [employer] [address] Kike Campos MD      hydrALAZINE  5 mg Intravenous Q6H PRN Kike Campos MD      HYDROmorphone  0 5 mg Intravenous Q6H PRN Kike Campos MD      isosorbide mononitrate  60 mg Oral Daily Kike Campos MD      ketorolac  15 mg Intravenous Q6H PRN Lashanda Zafar DO      losartan  100 mg Oral Daily Kike Campos MD      ondansetron  4 mg Intravenous Q6H PRN Kike Campos MD      oxyCODONE  2 5 mg Oral Q4H PRN Lashanda Zafar DO      ranolazine  1,000 mg Oral Q12H [address] Kike Campos MD      senna  1 tablet Oral Daily Kike Campos MD      sodium chloride  1 spray Each Nare Q1H PRN Lashanda Zafar DO          Today, Patient Was Seen By: Lashanda Zafar DO    **Please Note: This note may have been constructed using a voice recognition system  **

## 2021-09-15 NOTE — ASSESSMENT & PLAN NOTE
· CT lung- Peripheral consolidation in right upper lobe with surrounding ground-glass; no pulmonary embolisms noted on imaging  · Could be viral etiology given procalcitonin negative x2  · Covid 19 negative x2  · Blood culture x2 negative to date  · Does have ground glass infiltrates, spiking fevers despite antibiotic initiation  · Will discontinue antibiotics at this time given procalcitonin negative x2  · Infectious workup negative thus far  · Will have pulmonology see patient tomorrow

## 2021-09-16 LAB
ANION GAP SERPL CALCULATED.3IONS-SCNC: 12 MMOL/L (ref 4–13)
BUN SERPL-MCNC: 23 MG/DL (ref 5–25)
CALCIUM SERPL-MCNC: 8.7 MG/DL (ref 8.3–10.1)
CHLORIDE SERPL-SCNC: 96 MMOL/L (ref 100–108)
CO2 SERPL-SCNC: 23 MMOL/L (ref 21–32)
CREAT SERPL-MCNC: 0.97 MG/DL (ref 0.6–1.3)
GFR SERPL CREATININE-BSD FRML MDRD: 81 ML/MIN/1.73SQ M
GLUCOSE SERPL-MCNC: 111 MG/DL (ref 65–140)
MAGNESIUM SERPL-MCNC: 2.1 MG/DL (ref 1.6–2.6)
POTASSIUM SERPL-SCNC: 4.2 MMOL/L (ref 3.5–5.3)
SODIUM SERPL-SCNC: 131 MMOL/L (ref 136–145)

## 2021-09-16 PROCEDURE — 83735 ASSAY OF MAGNESIUM: CPT | Performed by: STUDENT IN AN ORGANIZED HEALTH CARE EDUCATION/TRAINING PROGRAM

## 2021-09-16 PROCEDURE — 80048 BASIC METABOLIC PNL TOTAL CA: CPT | Performed by: STUDENT IN AN ORGANIZED HEALTH CARE EDUCATION/TRAINING PROGRAM

## 2021-09-16 PROCEDURE — 99223 1ST HOSP IP/OBS HIGH 75: CPT | Performed by: INTERNAL MEDICINE

## 2021-09-16 PROCEDURE — 99222 1ST HOSP IP/OBS MODERATE 55: CPT | Performed by: INTERNAL MEDICINE

## 2021-09-16 PROCEDURE — 99232 SBSQ HOSP IP/OBS MODERATE 35: CPT | Performed by: INTERNAL MEDICINE

## 2021-09-16 RX ORDER — SODIUM CHLORIDE 9 MG/ML
100 INJECTION, SOLUTION INTRAVENOUS CONTINUOUS
Status: DISPENSED | OUTPATIENT
Start: 2021-09-16 | End: 2021-09-16

## 2021-09-16 RX ADMIN — CARVEDILOL 25 MG: 12.5 TABLET, FILM COATED ORAL at 17:15

## 2021-09-16 RX ADMIN — ACETAMINOPHEN 650 MG: 325 TABLET, FILM COATED ORAL at 22:00

## 2021-09-16 RX ADMIN — RANOLAZINE 1000 MG: 500 TABLET, FILM COATED, EXTENDED RELEASE ORAL at 08:27

## 2021-09-16 RX ADMIN — FLUOXETINE HYDROCHLORIDE 40 MG: 10 CAPSULE ORAL at 08:28

## 2021-09-16 RX ADMIN — SODIUM CHLORIDE 100 ML/HR: 0.9 INJECTION, SOLUTION INTRAVENOUS at 12:11

## 2021-09-16 RX ADMIN — GUAIFENESIN 200 MG: 100 SOLUTION ORAL at 21:55

## 2021-09-16 RX ADMIN — FUROSEMIDE 40 MG: 40 TABLET ORAL at 08:27

## 2021-09-16 RX ADMIN — ACETAMINOPHEN 650 MG: 325 TABLET, FILM COATED ORAL at 06:32

## 2021-09-16 RX ADMIN — ATORVASTATIN CALCIUM 40 MG: 40 TABLET, FILM COATED ORAL at 17:16

## 2021-09-16 RX ADMIN — ISOSORBIDE MONONITRATE 60 MG: 60 TABLET, EXTENDED RELEASE ORAL at 08:27

## 2021-09-16 RX ADMIN — OXYCODONE HYDROCHLORIDE 2.5 MG: 5 TABLET ORAL at 11:56

## 2021-09-16 RX ADMIN — CARVEDILOL 25 MG: 12.5 TABLET, FILM COATED ORAL at 08:30

## 2021-09-16 RX ADMIN — RANOLAZINE 1000 MG: 500 TABLET, FILM COATED, EXTENDED RELEASE ORAL at 21:55

## 2021-09-16 RX ADMIN — LOSARTAN POTASSIUM 100 MG: 50 TABLET, FILM COATED ORAL at 08:27

## 2021-09-16 RX ADMIN — SALINE NASAL SPRAY 1 SPRAY: 1.5 SOLUTION NASAL at 06:33

## 2021-09-16 RX ADMIN — HYDROMORPHONE HYDROCHLORIDE 0.5 MG: 1 INJECTION, SOLUTION INTRAMUSCULAR; INTRAVENOUS; SUBCUTANEOUS at 22:56

## 2021-09-16 RX ADMIN — GABAPENTIN 400 MG: 400 CAPSULE ORAL at 21:56

## 2021-09-16 RX ADMIN — ASPIRIN 81 MG: 81 TABLET, COATED ORAL at 08:27

## 2021-09-16 RX ADMIN — OXYCODONE HYDROCHLORIDE 2.5 MG: 5 TABLET ORAL at 21:56

## 2021-09-16 RX ADMIN — CLOPIDOGREL BISULFATE 75 MG: 75 TABLET ORAL at 08:27

## 2021-09-16 RX ADMIN — HEPARIN SODIUM 5000 UNITS: 5000 INJECTION INTRAVENOUS; SUBCUTANEOUS at 15:07

## 2021-09-16 NOTE — CONSULTS
Consultation - Infectious Disease   Gene Belen Medley 77 y o  male MRN: [id number]  Unit/Bed#: -01 Encounter: [id number]      IMPRESSION & RECOMMENDATIONS:   1  Fever  POA and persistent despite IV antibiotic for CAP x 3 days  Suspect secondary to abnormal RUL consolidation  PE study concerning for infarct vs pneumonia  Clinically patient has had right sided chest pain with deep inspiration, dry occasional cough, fatigue and poor appetite x 1 week  Procalcitonin negative x 2, Blood cultures x2 are negative, COVID x2 is negative, and influenza/RSV PCR x1 negative  Chest pain is improving  -patient being monitored D2 off antibiotics at present  -continue monitoring closely off antibiotics  -continue anticoagulation  -check urinary antigens  -monitor temperature and hemodynamics  -serial exam  -Pulmonary consult appreciated  -Bronchoscopy being considered pending repeat imaging in 4-6 weeks   -recheck CBC and BMP in a m  2  Chest Pain/SOB  POA with Admission Abnormal PE CT with RUL consolidation  concerning for infarct vs pneumonia  Clinically patient has had right sided chest pain with deep inspiration, dry occasional cough, fatigue and poor appetite x 1 week  Procalcitonin negative x 2, Blood cultures x2 are negative, COVID x2 is negative, and influenza/RSV PCR x1 negative  Patient vaccinated for COVID-19 April 2021 and never tested + for COVID  Chest pain is improving  Patient reports SOB has been progressive over last 6 months with exertion/climbing stairs  -monitor respiratory status off antibiotics at present  -monitor temperature and hemodynamics  -serial exam  -anticoagulation per primary care team   -Pulmonary consult appreciated  -Bronchoscopy being considered pending repeat imaging in 4-6 weeks   -check d dimer in am      3   Sleep Apnea  On CPAP x 15 years without issue and cleans machine regularly   -check urinary antigens for completeness  -monitor respiratory status  -continue CPAP q hs     4   Morbid Obesity  BMI 47  Risk factor for clotting and infection in setting of CAD  -continues heparin subc and outpatient prescribed aspirin and Plavix while inpatient at present  -weight loss management per primary care team    Antibiotics:  D2 off Ceftriaxone    I have discussed the above management plan in detail with patient, RN, and the primary service    Extensive review of the medical records in epic including review of the notes, radiographs, and laboratory results     HISTORY OF PRESENT ILLNESS:  Reason for Consult: Fever    HPI: Ebenezer Bauman is a 77y o  year old male with morbid obesity, CAD s/p CABG 2011, HTN, and CHF who presented to the ER 9/12/21 with shortness of breath acutely severe over last few days prior to admission associated with right sided chest pain worse with inspiration  In the ER he had fever to 102 4 and modest white count of 11 6  PE study was showing right upper lobe consolidation appearing to be concerning for pulmonary infarct, however with fever, pneumonia could not be excluded  Patient was admitted on IV ceftriaxone  Fever appeared to trend down but then patient spiked to 102 again on 09/14 despite IV antibiotic D3 for CAP  Procalcitonin levels have been negative x 2  Antibiotic was discontinued on 09/14/2021  Patient's T-max overnight was 100 4 F  Subsequently Infectious Disease consultation is now being requested regarding evaluation and management of fever  Blood cultures x 2 are negative, COVID x 2 is negative, and influenza/RSV PCR x1 negative  He reports that the chest pain with deep breath is abating  He still has very poor appetite over the last week  He feels fatigued and generalized myalgias  Patient reports SOB has been progressive over last 6 months with exertion/climbing stairs  He has not had any phlegm production or productive cough  He did have some nasal bleeding with the oxygen in place and following than nasal swabs on admission    He reports he does get chilly and sometimes has sweats with the fever  He often takes 2000 mg Tylenol for osteoarthritis at home as needed and does not think the low-dose Tylenol here will work for him because of that  He denies any nausea, vomiting, dysuria  He has had some loose stool  Today just 1 bowel movement  He has been CPAP for 15 years without issue and cleans his machine regularly  He is unaware of any tuberculosis exposure  He has been a commercial refrigerator repair professional for many years  He denies any tobacco use  He has never had pneumonia that he is aware of  He has had multiple bouts of lower extremity cellulitis since his CABG and they respond well to Keflex  REVIEW OF SYSTEMS:  A complete review of systems is negative other than that noted in the HPI  PAST MEDICAL HISTORY:  No past medical history on file  No past surgical history on file  FAMILY HISTORY:  Non-contributory    SOCIAL HISTORY:  Social History   Social History     Substance and Sexual Activity   Alcohol Use Never     Social History     Substance and Sexual Activity   Drug Use Never     Social History     Tobacco Use   Smoking Status Never Smoker   Smokeless Tobacco Never Used       ALLERGIES:  Allergies   Allergen Reactions    Medical Tape Other (See Comments)    Sulfa Antibiotics Other (See Comments)    Fosinopril Cough     Cough      Lisinopril Cough       MEDICATIONS:  All current active medications have been reviewed      PHYSICAL EXAM:  Temp:  [98 1 °F (36 7 °C)-100 4 °F (38 °C)] 98 1 °F (36 7 °C)  HR:  [61-63] 63  Resp:  [20] 20  BP: (110-137)/(46-73) 124/65  SpO2:  [92 %-94 %] 92 %  Temp (24hrs), Av 2 °F (37 3 °C), Min:98 1 °F (36 7 °C), Max:100 4 °F (38 °C)  Current: Temperature: 98 1 °F (36 7 °C)    Intake/Output Summary (Last 24 hours) at 2021 1347  Last data filed at 2021 0913  Gross per 24 hour   Intake 480 ml   Output 500 ml   Net -20 ml       General Appearance:  19-year-old morbidly obese male, appearing sluggish sitting at bedside, conversational on 2L NC O2, without active cough, overall nontoxic appearance, and in no respiratory distress   Head:  Normocephalic, without obvious abnormality, atraumatic   Eyes:  Conjunctiva pink and sclera anicteric, both eyes   Nose: Nares normal, mucosa normal, no drainage   Throat: Oropharynx moist without lesions   Neck: Supple, symmetrical, no adenopathy, no tenderness/mass/nodules   Back:   Symmetric, no curvature, ROM normal, no CVA tenderness   Lungs:   Decreased breath sounds Right posterior lung field with faint crepitation, no active cough, no egophony, left lung field fairly clear, on 2L NC O2   Chest Wall:  No tenderness on deep inspiration on exam or deformity   Heart:  RRR; decreased tones, no murmur, rub or gallop   Abdomen:   Soft, no focal tenderness, protuberant, positive bowel sounds    Extremities: No cyanosis, clubbing, + LE ankle edema with some venous staining bilaterally, R > L  R medial thigh soft, nontender bulge s/p pseudoaneurysm  No erythema, open wounds  Skin: No rashes or lesions  No draining wounds noted  IV site nontender  Lymph nodes: Cervical, supraclavicular nodes normal   Neurologic: Alert and oriented times 3, extremity strength 5/5 and symmetric       LABS, IMAGING, & OTHER STUDIES:  Lab Results:  I have personally reviewed pertinent labs    Results from last 7 days   Lab Units 09/15/21  0635 09/14/21  0507 09/13/21  0609   WBC Thousand/uL 9 64 9 66 10 08   HEMOGLOBIN g/dL 11 4* 11 9* 11 7*   PLATELETS Thousands/uL 208 200 175     Results from last 7 days   Lab Units 09/16/21  0507 09/15/21  0635 09/14/21  0507 09/12/21  0957   SODIUM mmol/L 131* 133* 132* 133*   POTASSIUM mmol/L 4 2 3 8 3 1* 3 5   CHLORIDE mmol/L 96* 99* 97* 99*   CO2 mmol/L 23 24 24 24   BUN mg/dL 23 16 14 15   CREATININE mg/dL 0 97 0 92 0 87 0 83   EGFR ml/min/1 73sq m 81 86 90 92   CALCIUM mg/dL 8 7 8 3 8 1* 8 3   AST U/L  --  59*  --  21   ALT U/L  --  67 --  27   ALK PHOS U/L  --  67  --  62     Results from last 7 days   Lab Units 09/12/21  0957   BLOOD CULTURE  No Growth at 72 hrs  No Growth at 72 hrs  Results from last 7 days   Lab Units 09/13/21  0609 09/12/21  0957   PROCALCITONIN ng/ml 0 10 0 08                   Imaging Studies:   09/12/2021 PE study with CT abdomen pelvis:  Peripheral consolidation in the right upper lobe with surrounding ground-glass  Appearance representative pulmonary infarct without definite PE  Small right pleural effusion  No acute abdominal findings  Other Studies:   I have personally reviewed pertinent reports

## 2021-09-16 NOTE — ASSESSMENT & PLAN NOTE
· T-max in last 24 hours 100 4  · CT lung- Peripheral consolidation in right upper lobe with surrounding ground-glass; no pulmonary embolisms noted on imaging  · Etiology likely secondary to consolidation noted on lung unclear if this is infarct versus less likely pneumonia  · Covid 19 negative x2  · Blood culture x2 negative to date  · Continue to hold antibiotics at this time given procalcitonin negative x2  · Infectious disease consulted; will follow-up recommendation  · Pulmonology consulted; will follow-up recommendations  · Infectious workup negative thus far

## 2021-09-16 NOTE — PROGRESS NOTES
[address] [facility]  Progress Note - Princess Flores 1955, 77 y o  male MRN: [id number]  Unit/Bed#: -01 Encounter: [id number]  Primary Care Provider: No primary care provider on file     Date and time admitted to hospital: 9/12/2021  9:39 AM    * Fever  Assessment & Plan  · T-max in last 24 hours 100 4  · CT lung- Peripheral consolidation in right upper lobe with surrounding ground-glass; no pulmonary embolisms noted on imaging  · Etiology likely secondary to consolidation noted on lung unclear if this is infarct versus less likely pneumonia  · Covid 19 negative x2  · Blood culture x2 negative to date  · Continue to hold antibiotics at this time given procalcitonin negative x2  · Infectious disease consulted; will follow-up recommendation  · Pulmonology consulted; will follow-up recommendations  · Infectious workup negative thus far    Renal lesion  Assessment & Plan  · Renal lesion noted on CT abdomen pelvis  · Patient will need follow-up with outpatient MRI    Hypertension  Assessment & Plan  · Well controlled currently  · Continue home dose coreg, losartan, prn hydralazine     Varicose veins of right lower extremity  Assessment & Plan  · Initially started on heparin gtt for concern of PE, later corrected by radiology as pneumonia  · Heparin gtt discontinued, but patient has indurated varicose vein in right thigh  · 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  · Venous duplex negative for dvt   · Follow up with vascular surgery as an outpatient     Chronic diastolic congestive heart failure ([facility] [address] )  Assessment & Plan  Wt Readings from Last 3 Encounters:   09/13/21 (!) 145 kg (320 lb)   02/24/14 (!) 147 kg (324 lb 4 oz)   12/13/13 (!) 144 kg (318 lb 4 9 oz)     · Last ECHO in 2020 with preserved EF  · Continue home dose lasix, monitor I/O, lytes, weight  · Does have lower extremity edema but reports baseline   · Appears euvolemic on exam    SIRS (systemic inflammatory response syndrome) [facility])  Assessment & Plan  · Unclear etiology at this time; as procalcitonin has been negative x2 unlikely to have bacterial pneumonia currently, but may be secondary to viral pneumonia versus possible pulmonary infarct    Coronary artery disease  Assessment & Plan  · Multivessel CAD status post CABG in 2011  · Continue aspirin, ranexa, imdur, plavix, crestor equivalent  · Follows with [facility] cardiology         VTE Pharmacologic Prophylaxis: VTE Score: 5 High Risk (Score >/= 5) - Pharmacological DVT Prophylaxis Ordered: heparin  Sequential Compression Devices Ordered  Patient Centered Rounds: I performed bedside rounds with nursing staff today  Discussions with Specialists or Other Care Team Provider: pulmonology, ID    Education and Discussions with Family / Patient: Updated  (wife) via phone  Time Spent for Care: 30 minutes  More than 50% of total time spent on counseling and coordination of care as described above  Current Length of Stay: 4 day(s)  Current Patient Status: Inpatient   Certification Statement: The patient will continue to require additional inpatient hospital stay due to hypoxia and fever  Discharge Plan: Anticipate discharge in 48-72 hrs to home  Code Status: Level 1 - Full Code    Subjective:   Patient resting comfortably on examination  Patient still markedly short of breath with exertion, but is fine when resting  T-max overnight 100 4  Objective:     Vitals:   Temp (24hrs), Av 2 °F (37 3 °C), Min:98 1 °F (36 7 °C), Max:100 4 °F (38 °C)    Temp:  [98 1 °F (36 7 °C)-100 4 °F (38 °C)] 98 1 °F (36 7 °C)  HR:  [61-63] 63  Resp:  [20] 20  BP: (110-137)/(46-73) 124/65  SpO2:  [92 %-94 %] 92 %  Body mass index is 47 26 kg/m²  Input and Output Summary (last 24 hours):      Intake/Output Summary (Last 24 hours) at 2021 1308  Last data filed at 2021 0913  Gross per 24 hour   Intake 720 ml   Output 500 ml   Net 220 ml       Physical Exam:   Physical Exam  Vitals and nursing note reviewed  Constitutional:       General: He is not in acute distress  Appearance: He is well-developed  Comments: 2L NC   HENT:      Head: Normocephalic and atraumatic  Eyes:      General: No scleral icterus  Conjunctiva/sclera: Conjunctivae normal    Cardiovascular:      Rate and Rhythm: Normal rate and regular rhythm  Heart sounds: Normal heart sounds  No murmur heard  No friction rub  No gallop  Pulmonary:      Effort: Pulmonary effort is normal  No respiratory distress  Breath sounds: Normal breath sounds  No wheezing or rales  Abdominal:      General: Bowel sounds are normal  There is no distension  Palpations: Abdomen is soft  Tenderness: There is no abdominal tenderness  Musculoskeletal:         General: Normal range of motion  Skin:     General: Skin is warm  Findings: No rash  Neurological:      Mental Status: He is alert and oriented to person, place, and time            Additional Data:     Labs:  Results from last 7 days   Lab Units 09/15/21  0635   WBC Thousand/uL 9 64   HEMOGLOBIN g/dL 11 4*   HEMATOCRIT % 32 6*   PLATELETS Thousands/uL 208   NEUTROS PCT % 72   LYMPHS PCT % 11*   MONOS PCT % 16*   EOS PCT % 0     Results from last 7 days   Lab Units 09/16/21  0507 09/15/21  0635   SODIUM mmol/L 131* 133*   POTASSIUM mmol/L 4 2 3 8   CHLORIDE mmol/L 96* 99*   CO2 mmol/L 23 24   BUN mg/dL 23 16   CREATININE mg/dL 0 97 0 92   ANION GAP mmol/L 12 10   CALCIUM mg/dL 8 7 8 3   ALBUMIN g/dL  --  2 6*   TOTAL BILIRUBIN mg/dL  --  0 69   ALK PHOS U/L  --  67   ALT U/L  --  67   AST U/L  --  59*   GLUCOSE RANDOM mg/dL 111 113     Results from last 7 days   Lab Units 09/12/21  0957   INR  1 10             Results from last 7 days   Lab Units 09/13/21  0609 09/12/21  0957   LACTIC ACID mmol/L  --  0 7   PROCALCITONIN ng/ml 0 10 0 08       Lines/Drains:  Invasive Devices     Peripheral Intravenous Line            Peripheral IV 09/15/21 Right Forearm <1 day                      Imaging: No pertinent imaging reviewed  Recent Cultures (last 7 days):   Results from last 7 days   Lab Units 09/12/21  0957   BLOOD CULTURE  No Growth at 72 hrs  No Growth at 72 hrs  Last 24 Hours Medication List:   Current Facility-Administered Medications   Medication Dose Route Frequency Provider Last Rate    acetaminophen  650 mg Oral Q6H PRN Gwen Fabian MD      aspirin  81 mg Oral Daily Gwen Fabian MD      atorvastatin  40 mg Oral Daily With Oly Paul MD      calcium carbonate  1,000 mg Oral Daily PRN Gwen Fabian MD      carvedilol  25 mg Oral BID With Meals Gwen Fabian MD      clopidogrel  75 mg Oral Daily Gwen Fabian MD      docusate sodium  100 mg Oral BID Gwen Fabian MD      FLUoxetine  40 mg Oral Daily Gwen Fabian MD      furosemide  40 mg Oral Daily Gwen Fabian MD      gabapentin  400 mg Oral HS Gwen Fabian MD      guaiFENesin  200 mg Oral Q4H PRN Sowmya Field DO      heparin (porcine)  5,000 Units Subcutaneous [facility] [address] Gwen Fabian MD      hydrALAZINE  5 mg Intravenous Q6H PRN Gwen Fabian MD      HYDROmorphone  0 5 mg Intravenous Q6H PRN Gwen Fabian MD      isosorbide mononitrate  60 mg Oral Daily Gwen Fabian MD      ketorolac  15 mg Intravenous Q6H PRN Sowmya Field DO      losartan  100 mg Oral Daily Gwen Fabian MD      ondansetron  4 mg Intravenous Q6H PRN Gwen Fabian MD      oxyCODONE  2 5 mg Oral Q4H PRN Sowmya Field DO      ranolazine  1,000 mg Oral Q12H [address] Gwen Fabian MD      senna  1 tablet Oral Daily Gwen Fabian MD      sodium chloride  1 spray Each Nare Q1H PRN Sowmya Field DO      sodium chloride  100 mL/hr Intravenous Continuous Sowmya Field  mL/hr (09/16/21 1211)        Today, Patient Was Seen By: Sowmya Field DO    **Please Note: This note may have been constructed using a voice recognition system  **

## 2021-09-16 NOTE — ASSESSMENT & PLAN NOTE
Wt Readings from Last 3 Encounters:   09/13/21 (!) 145 kg (320 lb)   02/24/14 (!) 147 kg (324 lb 4 oz)   12/13/13 (!) 144 kg (318 lb 4 9 oz)     · Last ECHO in 2020 with preserved EF  · Continue home dose lasix, monitor I/O, lytes, weight  · Does have lower extremity edema but reports baseline   · Appears euvolemic on exam

## 2021-09-16 NOTE — ASSESSMENT & PLAN NOTE
· Unclear etiology at this time; as procalcitonin has been negative x2 unlikely to have bacterial pneumonia currently, but may be secondary to viral pneumonia versus possible pulmonary infarct

## 2021-09-16 NOTE — PLAN OF CARE
Problem: PAIN - ADULT  Goal: Verbalizes/displays adequate comfort level or baseline comfort level  Description: Interventions:  - Encourage patient to monitor pain and request assistance  - Assess pain using appropriate pain scale  - Administer analgesics based on type and severity of pain and evaluate response  - Implement non-pharmacological measures as appropriate and evaluate response  - Consider cultural and social influences on pain and pain management  - Notify physician/advanced practitioner if interventions unsuccessful or patient reports new pain  Outcome: Progressing     Problem: INFECTION - ADULT  Goal: Absence or prevention of progression during hospitalization  Description: INTERVENTIONS:  - Assess and monitor for signs and symptoms of infection  - Monitor lab/diagnostic results  - Monitor all insertion sites, i e  indwelling lines, tubes, and drains  - Monitor endotracheal if appropriate and nasal secretions for changes in amount and color  - [place] appropriate cooling/warming therapies per order  - Administer medications as ordered  - Instruct and encourage patient and family to use good hand hygiene technique  - Identify and instruct in appropriate isolation precautions for identified infection/condition  Outcome: Progressing     Problem: DISCHARGE PLANNING  Goal: Discharge to home or other facility with appropriate resources  Description: INTERVENTIONS:  - Identify barriers to discharge w/patient and caregiver  - Arrange for needed discharge resources and transportation as appropriate  - Identify discharge learning needs (meds, wound care, etc )  - Arrange for interpretive services to assist at discharge as needed  - Refer to Case Management Department for coordinating discharge planning if the patient needs post-hospital services based on physician/advanced practitioner order or complex needs related to functional status, cognitive ability, or social support system  Outcome: Progressing Problem: Knowledge Deficit  Goal: Patient/family/caregiver demonstrates understanding of disease process, treatment plan, medications, and discharge instructions  Description: Complete learning assessment and assess knowledge base    Interventions:  - Provide teaching at level of understanding  - Provide teaching via preferred learning methods  Outcome: Progressing

## 2021-09-16 NOTE — CONSULTS
Consultation - Pulmonary Medicine   Otto Delgadillo 77 y o  male MRN: [id number]  Unit/Bed#: -01 Encounter: [id number]      Assessment:  1  Chest pain and shortness of breath  2  Abnormal chest CT with RUL consolidation    Plan:   Patient with several months of mild dyspnea on exertion, acutely worsening shortness of breath as well as chest pain prior to admission  CTA on admission shows a peripheral consolidation in the right upper lobe with surrounding ground-glass opacities  No definite PE though distal arteries are not well visualized  Lower extremity Dopplers negative for DVT  Patient symptom presentation and imaging still suggest a pulmonary infarct  Less likely bacterial pneumonia, ? Viral illness  He has no leukocytosis, procalcitonin has been normal x2, T-max is 100 4°  Would obtain ID input as well    ? Need for bronchoscopy, will hold off for now  Would favor repeat imaging in 6-8 weeks  Will continue to follow  History of Present Illness   Physician Requesting Consult: Mekhi Truong DO  Reason for Consult / Principal Problem: RUL consolidation  Hx and PE limited by: None  HPI: Kina Winslow is a 77y o  year old male nonsmoker with past medical history of CHF, hypertension, CAD, vascular insufficiency who presents with complaint of sudden onset of right-sided chest pain worse with movement and deep breathing  Has also been having shortness of breath over the past couple of months which was acutely worsened prior to admission  This morning he reports his pain has almost completely resolved, still with shortness of breath as well as significant fatigue  He denies any significant cough for sputum production, has been spiking fevers here in the hospital     He has no prior history of asthma or COPD  In childhood/young adulthood he did have frequent bronchitis but this resolved into adulthood  No history of any blood clots   No cancer history, he is up to date with age appropriate cancer screenings  No recent travel, surgery or period of immobility  Consults    Review of Systems   Constitutional: Positive for fatigue and fever  HENT: Negative  Respiratory: Positive for shortness of breath  Cardiovascular: Positive for chest pain  Gastrointestinal: Negative  Genitourinary: Negative  Musculoskeletal: Negative  Skin: Negative  Allergic/Immunologic: Negative  Neurological: Negative  Psychiatric/Behavioral: Negative  Historical Information   No past medical history on file  No past surgical history on file  Social History   Social History     Substance and Sexual Activity   Alcohol Use Never     Social History     Substance and Sexual Activity   Drug Use Never     E-Cigarette/Vaping     E-Cigarette/Vaping Substances     Social History     Tobacco Use   Smoking Status Never Smoker   Smokeless Tobacco Never Used     Occupational History:     Family History: No family history on file      Meds/Allergies   all current active meds have been reviewed, pertinent pulmonary meds have been reviewed and current meds:   Current Facility-Administered Medications   Medication Dose Route Frequency    acetaminophen (TYLENOL) tablet 650 mg  650 mg Oral Q6H PRN    aspirin (ECOTRIN LOW STRENGTH) EC tablet 81 mg  81 mg Oral Daily    atorvastatin (LIPITOR) tablet 40 mg  40 mg Oral Daily With Dinner    calcium carbonate (TUMS) chewable tablet 1,000 mg  1,000 mg Oral Daily PRN    carvedilol (COREG) tablet 25 mg  25 mg Oral BID With Meals    clopidogrel (PLAVIX) tablet 75 mg  75 mg Oral Daily    docusate sodium (COLACE) capsule 100 mg  100 mg Oral BID    FLUoxetine (PROzac) capsule 40 mg  40 mg Oral Daily    furosemide (LASIX) tablet 40 mg  40 mg Oral Daily    gabapentin (NEURONTIN) capsule 400 mg  400 mg Oral HS    guaiFENesin (ROBITUSSIN) oral solution 200 mg  200 mg Oral Q4H PRN    heparin (porcine) subcutaneous injection 5,000 Units  5,000 Units Subcutaneous Q8H [address]    hydrALAZINE (APRESOLINE) injection 5 mg  5 mg Intravenous Q6H PRN    HYDROmorphone (DILAUDID) injection 0 5 mg  0 5 mg Intravenous Q6H PRN    isosorbide mononitrate (IMDUR) 24 hr tablet 60 mg  60 mg Oral Daily    ketorolac (TORADOL) injection 15 mg  15 mg Intravenous Q6H PRN    losartan (COZAAR) tablet 100 mg  100 mg Oral Daily    ondansetron (ZOFRAN) injection 4 mg  4 mg Intravenous Q6H PRN    oxyCODONE (ROXICODONE) IR tablet 2 5 mg  2 5 mg Oral Q4H PRN    ranolazine (RANEXA) 12 hr tablet 1,000 mg  1,000 mg Oral Q12H JAK    senna (SENOKOT) tablet 8 6 mg  1 tablet Oral Daily    sodium chloride (OCEAN) 0 65 % nasal spray 1 spray  1 spray Each Nare Q1H PRN    sodium chloride 0 9 % infusion  100 mL/hr Intravenous Continuous       Allergies   Allergen Reactions    Medical Tape Other (See Comments)    Sulfa Antibiotics Other (See Comments)    Fosinopril Cough     Cough      Lisinopril Cough       Objective   Vitals: Blood pressure 124/65, pulse 63, temperature 98 4 °F (36 9 °C), resp  rate 20, height 5' 9" (1 753 m), weight (!) 145 kg (320 lb), SpO2 92 %  ,Body mass index is 47 26 kg/m²  Intake/Output Summary (Last 24 hours) at 9/16/2021 1142  Last data filed at 9/16/2021 0913  Gross per 24 hour   Intake 720 ml   Output 500 ml   Net 220 ml     Invasive Devices     Peripheral Intravenous Line            Peripheral IV 09/15/21 Right Forearm <1 day                Physical Exam  Vitals reviewed  Constitutional:       General: He is not in acute distress  Appearance: Normal appearance  He is obese  He is not ill-appearing  HENT:      Head: Normocephalic and atraumatic  Mouth/Throat:      Pharynx: Oropharynx is clear  Eyes:      Conjunctiva/sclera: Conjunctivae normal       Pupils: Pupils are equal, round, and reactive to light  Cardiovascular:      Rate and Rhythm: Normal rate and regular rhythm  Pulmonary:      Effort: Pulmonary effort is normal    Abdominal:      General: Abdomen is flat  There is no distension  Palpations: Abdomen is soft  Tenderness: There is no abdominal tenderness  Musculoskeletal:         General: Normal range of motion  Cervical back: Normal range of motion  Right lower leg: Edema (chronic) present  Left lower leg: Edema (chronic) present  Skin:     General: Skin is warm and dry  Neurological:      Mental Status: He is alert and oriented to person, place, and time  Psychiatric:         Mood and Affect: Mood normal          Behavior: Behavior normal          Lab Results:   I have personally reviewed pertinent lab results  , BNP: No results found for: BNP, CBC: No results found for: WBC, HGB, HCT, MCV, PLT, ADJUSTEDWBC, MCH, MCHC, RDW, MPV, NRBC, CMP:   Lab Results   Component Value Date    SODIUM 131 (L) 09/16/2021    K 4 2 09/16/2021    CL 96 (L) 09/16/2021    CO2 23 09/16/2021    BUN 23 09/16/2021    CREATININE 0 97 09/16/2021    CALCIUM 8 7 09/16/2021    EGFR 81 09/16/2021     Imaging Studies: I have personally reviewed pertinent reports  and I have personally reviewed pertinent films in PACS  EKG, Pathology, and Other Studies: I have personally reviewed pertinent reports     and I have personally reviewed pertinent films in PACS  VTE Prophylaxis: Sequential compression device (Venodyne)  and Heparin    Code Status: Level 1 - Full Code  Advance Directive and Living Will:      Power of :    POLST:

## 2021-09-16 NOTE — CASE MANAGEMENT
Case Management Discharge Planning Note    Patient name Rosa Us  Location /-08 MRN [id number]  : 1955 Date 2021       Current Admission Date: 2021  Current Admission Diagnosis:  Fever  Previous Admission - Discharge Date:    LOS (days): 4  Geometric Mean LOS (GMLOS) (days): 3 10  Days to GMLOS:-0 8 Previous Discharge Diagnosis:  No discharge information exists for this patient  OBJECTIVE:  Risk of Unplanned Readmission Score: 8   Bundle(if applicable):    Current admission status: Inpatient  Preferred Pharmacy:   [facility] [address] [facility] [address]  Phone: [phone] Fax: [phone]    Primary Care Provider: No primary care provider on file  Primary Insurance: [facility]  Secondary Insurance: MEDICARE    DISCHARGE DETAILS:    Discharge planning discussed with[de-identified] Patient  Freedom of Choice: Yes (CM reviewed discharge plan  Patient reported that he may not need HHC, but he is okay with keeping it for now if needed  [facility] has accepted )    Anastasia Sands requested[de-identified] Physical Therapy, Occupational Therapy, [facility] Agency Name[de-identified] [address] [place] [address] Provider[de-identified] PCP  Home Health Services Needed[de-identified] Evaluate Functional Status and Safety, Gait/ADL Training, Strengthening/Theraputic Exercises to Improve Function  Homebound Criteria Met[de-identified] Uses an Assist Device (i e  cane, walker, etc)  Supporting Clincal Findings[de-identified] Fatigues Easliy in Short Distances, Limited Endurance    DME Referral Provided  Referral made for DME?: No     We would like to be able to fill any required prescriptions on discharge at our [address] [place] [address] and have them delivered to you at discharge in your room    Would you like to participate in this program? : No - Declined    Discharge Destination Plan[de-identified] Home    IMM Given (Date):: 21  IMM Given to[de-identified] Patient (IMM reviewed at bedside  Copy left at bedside   Original signed copy placed in medical records )

## 2021-09-17 LAB
ANION GAP SERPL CALCULATED.3IONS-SCNC: 8 MMOL/L (ref 4–13)
BASOPHILS # BLD AUTO: 0.04 THOUSANDS/ΜL (ref 0–0.1)
BASOPHILS NFR BLD AUTO: 0 % (ref 0–1)
BUN SERPL-MCNC: 15 MG/DL (ref 5–25)
CALCIUM SERPL-MCNC: 8.6 MG/DL (ref 8.3–10.1)
CHLORIDE SERPL-SCNC: 95 MMOL/L (ref 100–108)
CO2 SERPL-SCNC: 29 MMOL/L (ref 21–32)
CREAT SERPL-MCNC: 0.98 MG/DL (ref 0.6–1.3)
D DIMER PPP FEU-MCNC: 4.29 UG/ML FEU
EOSINOPHIL # BLD AUTO: 0.07 THOUSAND/ΜL (ref 0–0.61)
EOSINOPHIL NFR BLD AUTO: 1 % (ref 0–6)
ERYTHROCYTE [DISTWIDTH] IN BLOOD BY AUTOMATED COUNT: 14 % (ref 11.6–15.1)
GFR SERPL CREATININE-BSD FRML MDRD: 80 ML/MIN/1.73SQ M
GLUCOSE SERPL-MCNC: 105 MG/DL (ref 65–140)
HCT VFR BLD AUTO: 33 % (ref 36.5–49.3)
HGB BLD-MCNC: 11.4 G/DL (ref 12–17)
IMM GRANULOCYTES # BLD AUTO: 0.12 THOUSAND/UL (ref 0–0.2)
IMM GRANULOCYTES NFR BLD AUTO: 1 % (ref 0–2)
L PNEUMO1 AG UR QL IA.RAPID: NEGATIVE
LYMPHOCYTES # BLD AUTO: 1.27 THOUSANDS/ΜL (ref 0.6–4.47)
LYMPHOCYTES NFR BLD AUTO: 12 % (ref 14–44)
MCH RBC QN AUTO: 31 PG (ref 26.8–34.3)
MCHC RBC AUTO-ENTMCNC: 34.5 G/DL (ref 31.4–37.4)
MCV RBC AUTO: 90 FL (ref 82–98)
MONOCYTES # BLD AUTO: 1.57 THOUSAND/ΜL (ref 0.17–1.22)
MONOCYTES NFR BLD AUTO: 14 % (ref 4–12)
NEUTROPHILS # BLD AUTO: 7.92 THOUSANDS/ΜL (ref 1.85–7.62)
NEUTS SEG NFR BLD AUTO: 72 % (ref 43–75)
NRBC BLD AUTO-RTO: 0 /100 WBCS
PLATELET # BLD AUTO: 277 THOUSANDS/UL (ref 149–390)
PMV BLD AUTO: 9.7 FL (ref 8.9–12.7)
POTASSIUM SERPL-SCNC: 3.9 MMOL/L (ref 3.5–5.3)
RBC # BLD AUTO: 3.68 MILLION/UL (ref 3.88–5.62)
S PNEUM AG UR QL: NEGATIVE
SODIUM SERPL-SCNC: 132 MMOL/L (ref 136–145)
WBC # BLD AUTO: 10.99 THOUSAND/UL (ref 4.31–10.16)

## 2021-09-17 PROCEDURE — 85379 FIBRIN DEGRADATION QUANT: CPT | Performed by: PHYSICIAN ASSISTANT

## 2021-09-17 PROCEDURE — 99232 SBSQ HOSP IP/OBS MODERATE 35: CPT | Performed by: INTERNAL MEDICINE

## 2021-09-17 PROCEDURE — 87449 NOS EACH ORGANISM AG IA: CPT | Performed by: PHYSICIAN ASSISTANT

## 2021-09-17 PROCEDURE — 85025 COMPLETE CBC W/AUTO DIFF WBC: CPT | Performed by: INTERNAL MEDICINE

## 2021-09-17 PROCEDURE — 80048 BASIC METABOLIC PNL TOTAL CA: CPT | Performed by: INTERNAL MEDICINE

## 2021-09-17 RX ORDER — LORATADINE 10 MG/1
10 TABLET ORAL DAILY
Status: DISCONTINUED | OUTPATIENT
Start: 2021-09-17 | End: 2021-09-19 | Stop reason: HOSPADM

## 2021-09-17 RX ORDER — BENZONATATE 100 MG/1
100 CAPSULE ORAL 3 TIMES DAILY PRN
Status: DISCONTINUED | OUTPATIENT
Start: 2021-09-17 | End: 2021-09-19 | Stop reason: HOSPADM

## 2021-09-17 RX ADMIN — CARVEDILOL 25 MG: 12.5 TABLET, FILM COATED ORAL at 17:28

## 2021-09-17 RX ADMIN — CLOPIDOGREL BISULFATE 75 MG: 75 TABLET ORAL at 08:40

## 2021-09-17 RX ADMIN — ASPIRIN 81 MG: 81 TABLET, COATED ORAL at 08:40

## 2021-09-17 RX ADMIN — HEPARIN SODIUM 5000 UNITS: 5000 INJECTION INTRAVENOUS; SUBCUTANEOUS at 14:22

## 2021-09-17 RX ADMIN — SALINE NASAL SPRAY 1 SPRAY: 1.5 SOLUTION NASAL at 17:29

## 2021-09-17 RX ADMIN — RANOLAZINE 1000 MG: 500 TABLET, FILM COATED, EXTENDED RELEASE ORAL at 08:39

## 2021-09-17 RX ADMIN — GABAPENTIN 400 MG: 400 CAPSULE ORAL at 20:47

## 2021-09-17 RX ADMIN — CARVEDILOL 25 MG: 12.5 TABLET, FILM COATED ORAL at 08:39

## 2021-09-17 RX ADMIN — ATORVASTATIN CALCIUM 40 MG: 40 TABLET, FILM COATED ORAL at 17:28

## 2021-09-17 RX ADMIN — LORATADINE 10 MG: 10 TABLET ORAL at 10:55

## 2021-09-17 RX ADMIN — GUAIFENESIN 200 MG: 100 SOLUTION ORAL at 08:39

## 2021-09-17 RX ADMIN — LOSARTAN POTASSIUM 100 MG: 50 TABLET, FILM COATED ORAL at 08:39

## 2021-09-17 RX ADMIN — APIXABAN 10 MG: 5 TABLET, FILM COATED ORAL at 17:28

## 2021-09-17 RX ADMIN — SALINE NASAL SPRAY 1 SPRAY: 1.5 SOLUTION NASAL at 07:42

## 2021-09-17 RX ADMIN — GUAIFENESIN 200 MG: 100 SOLUTION ORAL at 17:28

## 2021-09-17 RX ADMIN — SALINE NASAL SPRAY 1 SPRAY: 1.5 SOLUTION NASAL at 05:36

## 2021-09-17 RX ADMIN — ISOSORBIDE MONONITRATE 60 MG: 60 TABLET, EXTENDED RELEASE ORAL at 08:40

## 2021-09-17 RX ADMIN — FLUOXETINE HYDROCHLORIDE 40 MG: 10 CAPSULE ORAL at 08:40

## 2021-09-17 RX ADMIN — FUROSEMIDE 40 MG: 40 TABLET ORAL at 08:40

## 2021-09-17 RX ADMIN — RANOLAZINE 1000 MG: 500 TABLET, FILM COATED, EXTENDED RELEASE ORAL at 20:46

## 2021-09-17 NOTE — PROGRESS NOTES
Progress Note - Infectious Disease   Gene Kulwinder Moser 77 y o  male MRN: [id number]  Unit/Bed#: -01 Encounter: [id number]      Impression/Plan:  1  Fever  POA and persistent despite IV antibiotic for CAP x 3 days  Suspect secondary to abnormal RUL consolidation  PE study concerning for infarct vs pneumonia  Clinically patient has had right sided chest pain with deep inspiration, dry occasional cough, fatigue and poor appetite x 1 week  Procalcitonin negative x 2, Blood cultures x2 are negative, COVID x2 is negative, and influenza/RSV PCR x1 negative  Chest pain is improving  -patient being monitored D3 off antibiotics at present  -continue monitoring closely off antibiotics  -continue anticoagulation  -follow up urinary antigens  -monitor temperature and hemodynamics  -serial exam  -Pulmonary ongoing follow up  -Bronchoscopy being considered pending repeat imaging in 4-6 weeks   -recheck CBC and BMP in a m      2  Chest Pain/SOB  POA with Admission Abnormal PE CT with RUL consolidation  concerning for infarct vs pneumonia  Clinically patient has had right sided chest pain with deep inspiration, dry occasional cough, fatigue and poor appetite x 1 week  Procalcitonin negative x 2, Blood cultures x2 are negative, COVID x2 is negative, and influenza/RSV PCR x1 negative  Patient vaccinated for COVID-19 April 2021 and never tested + for COVID  Chest pain is improving  Patient reports SOB has been progressive over last 6 months with exertion/climbing stairs  9/17/21 D Dimer 4 29  Patient requesting increased cough suppressant  -monitor respiratory status off antibiotics at present  -monitor temperature and hemodynamics  -serial exam  -anticoagulation per primary care team   -Pulmonary ongoing follow up  -Bronchoscopy being considered pending repeat imaging in 4-6 weeks   -Cough supportive management     3   Sleep Apnea   On CPAP x 15 years without issue and cleans machine regularly   -check urinary antigens for completeness  -monitor respiratory status  -continue CPAP q hs     4  Morbid Obesity  BMI 47  Risk factor for clotting and infection in setting of CAD  -continues heparin subc and outpatient prescribed aspirin and Plavix while inpatient at present  -weight loss management per primary care team     Antibiotics:  D3 off Ceftriaxone      Above impression and plan discussed in detail with patient, RN, and primary care team   We will see patient again 21 if here  Please call ID on call physician in meantime if questions  Subjective:  Patient has no fever, chills, sweats overnight; no nausea, vomiting, diarrhea; + dry cough increased this am and on deep inspiration yet, shortness of breath and right side chest pain improving  Appetite poor  Fatigue slightly better  Patient requesting more than a 1/2 tbsp of Robitussin dosing  Objective:  Vitals:  Temp:  [98 1 °F (36 7 °C)-99 6 °F (37 6 °C)] 98 8 °F (37 1 °C)  HR:  [78] 78  Resp:  [18-21] 21  BP: (112-138)/(61-69) 138/69  SpO2:  [97 %-98 %] 97 %  Temp (24hrs), Av 9 °F (37 2 °C), Min:98 1 °F (36 7 °C), Max:99 6 °F (37 6 °C)  Current: Temperature: 98 8 °F (37 1 °C)    Physical Exam:   General Appearance:  Alert, interactive, sluggish, sitting at bedside, no acute distress with conversation on 2L NC statting 97%   Throat: Oropharynx moist without lesions  Lungs:   Decreased scattered coarse breath sounds RUL with some egophony, otherwise fairly clear to auscultation left lung; + dry cough on deep inspiration/expiration  Heart:  RRR; no murmur   Abdomen:   Soft, non-tender, protuberant, positive bowel sounds  Extremities: No clubbing, cyanosis, + ankle edema   : No connor, no SPT   Skin: No new rashes or lesions  IV site nontender         Labs, Imaging, & Other studies:   All pertinent labs and imaging studies were personally reviewed  Results from last 7 days   Lab Units 21  0448 09/15/21  0635 21  0507   WBC Thousand/uL 10 99* 9 64 9 66 HEMOGLOBIN g/dL 11 4* 11 4* 11 9*   PLATELETS Thousands/uL 277 208 200     Results from last 7 days   Lab Units 09/17/21  0448 09/16/21  0507 09/15/21  0635 09/12/21  0957   SODIUM mmol/L 132* 131* 133* 133*   POTASSIUM mmol/L 3 9 4 2 3 8 3 5   CHLORIDE mmol/L 95* 96* 99* 99*   CO2 mmol/L 29 23 24 24   BUN mg/dL 15 23 16 15   CREATININE mg/dL 0 98 0 97 0 92 0 83   EGFR ml/min/1 73sq m 80 81 86 92   CALCIUM mg/dL 8 6 8 7 8 3 8 3   AST U/L  --   --  59* 21   ALT U/L  --   --  67 27   ALK PHOS U/L  --   --  67 62     Results from last 7 days   Lab Units 09/12/21  0957   BLOOD CULTURE  No Growth After 4 Days  No Growth After 4 Days       Results from last 7 days   Lab Units 09/13/21  0609 09/12/21  0957   PROCALCITONIN ng/ml 0 10 0 08             Results from last 7 days   Lab Units 09/17/21  0448   D-DIMER QUANTITATIVE ug/ml FEU 4 29*

## 2021-09-17 NOTE — PROGRESS NOTES
Progress Note - Pulmonary   Gene S Maldonado 77 y o  male MRN: [id number]  Unit/Bed#: -01 Encounter: [id number]    Assessment:  1  Chest pain and shortness of breath  2  Abnormal chest CT with RUL consolidation    Plan:  Clinical picture consistent with pulmonary infarct though no discreet PE seen on imaging  Timing was not adequate to see peripheral vessels  COVID/Flu/RSV are negative, legionella and s pneumo are pending, procal normal x 2  No leukocytosis  Would continue off antibiotics  Would treat with Eliquis for 3 months for acute PE  He has been afebrile now over 24 hours  He is currently on 2L NC, no documented hypoxia  Would perform home O2 eval prior to discharge  Will repeat imaging to reassess for resolution of opacity  He can follow up with us in 1-2 weeks  Anticipate discharge home tomorrow  Discussed with Dr Dangelo Booth from [facility]  Subjective:   Patient resting in bed  He continues to have a cough, robitussin is helping  Still with some fatigue though improved slightly  Objective:     Vitals: Blood pressure 138/69, pulse 78, temperature 98 8 °F (37 1 °C), resp  rate 21, height 5' 9" (1 753 m), weight (!) 145 kg (320 lb), SpO2 97 %  ,Body mass index is 47 26 kg/m²        Intake/Output Summary (Last 24 hours) at 9/17/2021 1200  Last data filed at 9/17/2021 0831  Gross per 24 hour   Intake 360 ml   Output 700 ml   Net -340 ml       Invasive Devices     Peripheral Intravenous Line            Peripheral IV 09/16/21 Left Forearm <1 day                Physical Exam: /69   Pulse 78   Temp 98 8 °F (37 1 °C)   Resp 21   Ht 5' 9" (1 753 m)   Wt (!) 145 kg (320 lb)   SpO2 97%   BMI 47 26 kg/m²   General appearance: alert and oriented, in no acute distress  Head: Normocephalic, without obvious abnormality, atraumatic  Eyes: negative findings: conjunctivae and sclerae normal  Lungs: diminished breath sounds  Heart: regular rate and rhythm  Abdomen: normal findings: soft, non-tender  Extremities: No edema  Skin: Warm and dry  Neurologic: Mental status: Alert, oriented, thought content appropriate     Labs: I have personally reviewed pertinent lab results  , CBC:   Lab Results   Component Value Date    WBC 10 99 (H) 09/17/2021    HGB 11 4 (L) 09/17/2021    HCT 33 0 (L) 09/17/2021    MCV 90 09/17/2021     09/17/2021    MCH 31 0 09/17/2021    MCHC 34 5 09/17/2021    RDW 14 0 09/17/2021    MPV 9 7 09/17/2021    NRBC 0 09/17/2021   , CMP:   Lab Results   Component Value Date    SODIUM 132 (L) 09/17/2021    K 3 9 09/17/2021    CL 95 (L) 09/17/2021    CO2 29 09/17/2021    BUN 15 09/17/2021    CREATININE 0 98 09/17/2021    CALCIUM 8 6 09/17/2021    EGFR 80 09/17/2021     Imaging and other studies: I have personally reviewed pertinent reports     and I have personally reviewed pertinent films in PACS

## 2021-09-17 NOTE — UTILIZATION REVIEW
Continued Stay Review    Date: 9/17/21                           Current Patient Class: IP  Current Level of Care: MS    HPI:66 y o  male initially admitted on 9/12/21 with abd pain , chills, abrupt onset of right-sided chest pain and worsening shortness of breath  Admitted w/ pneumonia -treated with 3 days of ceftriaxone with a decreased temperature curve but the antibiotics were stopped, procalcitonin levels remain normal      Assessment/Plan:   9/15-Blood culture x2 negative to date  Will discontinue antibiotics at this time given procalcitonin negative x2  Infectious workup negative thus far  Patient in slight distress on exam   Patient states that breathing is similar compared to yesterday  On O2 @2 L      9/16-   T-max in last 24 hours 100 4  Etiology likely secondary to consolidation noted on lung unclear if this is infarct versus less likely pneumonia  ID and pulmonology consulted  still markedly short of breath with exertion  Pulmonology-Radiographically, this represents a pulmonary infarct  Presence of fever does not exclude infarct as the cause of fever, lack of embolism does not exclude infarct  Would favor observation off antibiotics  Would favor follow-up imaging in 4-6 weeks  If Infectious Disease feels strongly that bronchoscopy is indicated, would schedule for bronchoscopy with lavage  ID-Pt spiked fever 9/15 with sweats and chills, continues to feel quite poorly with ongoing right-sided chest pain, dry cough with deep inspiration, Decreased breath sounds bilaterally  Plan - continue to monitor off abx, , check urine Legionella pneumococcal antigen, check D-dimer, f/u blood cultures, recheck CBC, CMP    9/17  Fevers- pt continues with fevers, likely etiology secondary to pulmonary infarct with no clear identified pulmonary embolism which may have resolved   Plan to start Eliquis 10 mg b i d  For 7 days and then 5 mg b i d  there after  Pulmonology and ID following   Pt continues with congestion and cough  O@ @2 L nc  Pt being observed off abx, temp curve has improved   Breath sounds distant due to obesity but otherwise clear  No pleural friction rub  At this point would defer bronchoscopy per pulmonology  Continue to monitor symptoms  D Dimer 4 29  Patient requesting increased cough suppressant  R sided chest pain  Continues but improving, continues with fatigue          Vital Signs:  Date/Time  Temp  Pulse  Resp  BP  MAP (mmHg)  SpO2  Calculated FIO2 (%) - Nasal Cannula  Nasal Cannula O2 Flow Rate (L/min)  O2 Device  O2 Interface Device  Patient Position - Orthostatic VS   09/17/21 1500  98 8 °F (37 1 °C)  78  20  135/55  95  95 %  --  --  --  --  Lying   09/17/21 07:44:07  98 8 °F (37 1 °C)  --  --  --  --  97 %  28  2 L/min  None (Room air)  --  --   09/17/21 07:33:06  98 8 °F (37 1 °C)  78  21  138/69  92  --  --  --  --  --  --   09/16/21 2332  99 4 °F (37 4 °C)  78  20  112/61  --  98 %  --  --  Other (comment)   --  Lying   O2 Device: home unit cpap at 09/16/21 2332   09/16/21 1940  --  --  --  --  --  --  28  2 L/min  Nasal cannula  --  --   09/16/21 14:57:57  99 6 °F (37 6 °C)  --  18  130/66  87  --  --  --  --  --  --   09/16/21 11:44:21  98 1 °F (36 7 °C)  --  --  --  --  --  --  --  --  --  --   09/16/21 08:26:01  98 4 °F (36 9 °C)  --  --  --  --  --  --  --  --  --  --   09/16/21 0820  --  --  --  --  --  --  --  --  None (Room air)  --  --   09/16/21 07:25:39  99 4 °F (37 4 °C)  --  --  124/65  85  --  --  --  --  --  --   09/16/21 0630  100 4 °F (38 °C)  --  --  --  --  --  --  --  --  --  --   09/15/21 2302  --  63  20  --  --  92 %  --  --  --  Nasal mask  --   09/15/21 2244  99 4 °F (37 4 °C)  61  20  110/46Abnormal   69  94 %  --  --  --  --  --   09/15/21 1935  --  --  --  --  --  --  28  2 L/min  None (Room air)  --  --   09/15/21 15:26:52  99 5 °F (37 5 °C)  --  --  137/73  94  --  --  --  --  --  --   09/15/21 0734  --  --  --  --  --  93 %  28  2 L/min  None (Room air)  --  -- 09/15/21 07:03:11  102 °F (38 9 °C)Abnormal   --  19  127/64  85  --  --  --  --  --  --   09/15/21 06:11:35  102 4 °F (39 1 °C)Abnormal   --  --  --  --  --  --  --  --  --  --   09/15/21 01:48:29  101 3 °F (38 5 °C)Abnormal   --  --  --  --  --  --  --  --  --  --   09/15/21 01:47:24  101 2 °F (38 4 °C)Abnormal                              Pertinent Labs/Diagnostic Results:   Results from last 7 days   Lab Units 09/15/21  0830 09/12/21  0957   SARS-COV-2  Negative Negative     Results from last 7 days   Lab Units 09/17/21  0448 09/15/21  0635 09/14/21  0507 09/13/21  0609 09/12/21  0957   WBC Thousand/uL 10 99* 9 64 9 66 10 08 11 60*   HEMOGLOBIN g/dL 11 4* 11 4* 11 9* 11 7* 12 4   HEMATOCRIT % 33 0* 32 6* 33 6* 33 7* 35 5*   PLATELETS Thousands/uL 277 208 200 175 188   NEUTROS ABS Thousands/µL 7 92* 7 00 7 39  --  8 38*         Results from last 7 days   Lab Units 09/17/21  0448 09/16/21  0507 09/15/21  0635 09/14/21  0507 09/13/21  0609   SODIUM mmol/L 132* 131* 133* 132* 136   POTASSIUM mmol/L 3 9 4 2 3 8 3 1* 3 5   CHLORIDE mmol/L 95* 96* 99* 97* 101   CO2 mmol/L 29 23 24 24 24   ANION GAP mmol/L 8 12 10 11 11   BUN mg/dL 15 23 16 14 14   CREATININE mg/dL 0 98 0 97 0 92 0 87 0 82   EGFR ml/min/1 73sq m 80 81 86 90 92   CALCIUM mg/dL 8 6 8 7 8 3 8 1* 8 0*   MAGNESIUM mg/dL  --  2 1 2 1 1 9 1 9     Results from last 7 days   Lab Units 09/15/21  0635 09/12/21  0957   AST U/L 59* 21   ALT U/L 67 27   ALK PHOS U/L 67 62   TOTAL PROTEIN g/dL 7 1 7 6   ALBUMIN g/dL 2 6* 3 4*   TOTAL BILIRUBIN mg/dL 0 69 0 85   BILIRUBIN DIRECT mg/dL 0 30*  --          Results from last 7 days   Lab Units 09/17/21  0448 09/16/21  0507 09/15/21  0635 09/14/21  0507 09/13/21  0609 09/12/21  0957   GLUCOSE RANDOM mg/dL 105 111 113 170* 109 103           Results from last 7 days   Lab Units 09/12/21  0957   TROPONIN I ng/mL <0 02     Results from last 7 days   Lab Units 09/17/21  0448   D-DIMER QUANTITATIVE ug/ml FEU 4 29*     Results from last 7 days   Lab Units 09/12/21  1229 09/12/21  0957   PROTIME seconds  --  14 4   INR   --  1 10   PTT seconds 37 40*         Results from last 7 days   Lab Units 09/13/21  0609 09/12/21  0957   PROCALCITONIN ng/ml 0 10 0 08     Results from last 7 days   Lab Units 09/12/21  0957   LACTIC ACID mmol/L 0 7               Results from last 7 days   Lab Units 09/12/21  0957   LIPASE u/L 55*             Results from last 7 days   Lab Units 09/12/21  1155   CLARITY UA  Clear   COLOR UA  Yellow   SPEC GRAV UA  <=1 005   PH UA  6 5   GLUCOSE UA mg/dl Negative   KETONES UA mg/dl 15 (1+)*   BLOOD UA  Negative   PROTEIN UA mg/dl Negative   NITRITE UA  Negative   BILIRUBIN UA  Negative   UROBILINOGEN UA E U /dl 1 0   LEUKOCYTES UA  Negative     Results from last 7 days   Lab Units 09/12/21  0957   INFLUENZA A PCR  Negative   INFLUENZA B PCR  Negative   RSV PCR  Negative           Results from last 7 days   Lab Units 09/12/21  0957   BLOOD CULTURE  No Growth After 4 Days  No Growth After 4 Days           Medications:   Scheduled Medications:  apixaban, 10 mg, Oral, BID  atorvastatin, 40 mg, Oral, Daily With Dinner  carvedilol, 25 mg, Oral, BID With Meals  clopidogrel, 75 mg, Oral, Daily  docusate sodium, 100 mg, Oral, BID  FLUoxetine, 40 mg, Oral, Daily  furosemide, 40 mg, Oral, Daily  gabapentin, 400 mg, Oral, HS  isosorbide mononitrate, 60 mg, Oral, Daily  loratadine, 10 mg, Oral, Daily  losartan, 100 mg, Oral, Daily  ranolazine, 1,000 mg, Oral, Q12H JAK  senna, 1 tablet, Oral, Daily      Continuous IV Infusions:     PRN Meds:  acetaminophen, 650 mg, Oral, Q6H PRN x2 9/16  benzonatate, 100 mg, Oral, TID PRN  calcium carbonate, 1,000 mg, Oral, Daily PRN  guaiFENesin, 200 mg, Oral, Q4H PRN x1 9/16, x1 9/17  hydrALAZINE, 5 mg, Intravenous, Q6H PRN  HYDROmorphone, 0 5 mg, Intravenous, Q6H PRN x1 9/16  ketorolac, 15 mg, Intravenous, Q6H PRN  ondansetron, 4 mg, Intravenous, Q6H PRN  oxyCODONE, 2 5 mg, Oral, Q4H PRN x2 9/16  sodium chloride, 1 spray, Each Nare, Q1H PRN x1 9/16, x2 9/17        Discharge Plan: TBD  Network Utilization Review Department  ATTENTION: Please call with any questions or concerns to [phone] and carefully listen to the prompts so that you are directed to the right person  All voicemails are confidential   [place] List all requests for admission clinical reviews, approved or denied determinations and any other requests to dedicated fax number below belonging to the campus where the patient is receiving treatment   List of dedicated fax numbers for the Facilities:  [address] DENIALS (Administrative/Medical Necessity) [phone]   [address] (Maternity/NICU/Pediatrics) [phone]   [address] [id number] [place] [address] [address] [facility] [place] [address] [phone]

## 2021-09-17 NOTE — PLAN OF CARE
Problem: PAIN - ADULT  Goal: Verbalizes/displays adequate comfort level or baseline comfort level  Description: Interventions:  - Encourage patient to monitor pain and request assistance  - Assess pain using appropriate pain scale  - Administer analgesics based on type and severity of pain and evaluate response  - Implement non-pharmacological measures as appropriate and evaluate response  - Consider cultural and social influences on pain and pain management  - Notify physician/advanced practitioner if interventions unsuccessful or patient reports new pain  Outcome: Progressing     Problem: INFECTION - ADULT  Goal: Absence or prevention of progression during hospitalization  Description: INTERVENTIONS:  - Assess and monitor for signs and symptoms of infection  - Monitor lab/diagnostic results  - Monitor all insertion sites, i e  indwelling lines, tubes, and drains  - Monitor endotracheal if appropriate and nasal secretions for changes in amount and color  - [place] appropriate cooling/warming therapies per order  - Administer medications as ordered  - Instruct and encourage patient and family to use good hand hygiene technique  - Identify and instruct in appropriate isolation precautions for identified infection/condition  Outcome: Progressing     Problem: DISCHARGE PLANNING  Goal: Discharge to home or other facility with appropriate resources  Description: INTERVENTIONS:  - Identify barriers to discharge w/patient and caregiver  - Arrange for needed discharge resources and transportation as appropriate  - Identify discharge learning needs (meds, wound care, etc )  - Arrange for interpretive services to assist at discharge as needed  - Refer to Case Management Department for coordinating discharge planning if the patient needs post-hospital services based on physician/advanced practitioner order or complex needs related to functional status, cognitive ability, or social support system  Outcome: Progressing Problem: Knowledge Deficit  Goal: Patient/family/caregiver demonstrates understanding of disease process, treatment plan, medications, and discharge instructions  Description: Complete learning assessment and assess knowledge base    Interventions:  - Provide teaching at level of understanding  - Provide teaching via preferred learning methods  Outcome: Progressing

## 2021-09-17 NOTE — ASSESSMENT & PLAN NOTE
· Further fevers at this time  · CT lung- Peripheral consolidation in right upper lobe with surrounding ground-glass; no pulmonary embolisms noted on imaging  · Etiology likely secondary to pulmonary infarct  · Covid 19 negative x2  · Blood culture x2 negative to date  · Continue to hold antibiotics at this time given procalcitonin negative x2  · Infectious disease following  · Pulmonology following  · Plan to start Eliquis 10 mg b i d  For 7 days and then 5 mg b i d   Thereafter  · Infectious workup negative thus far

## 2021-09-17 NOTE — CASE MANAGEMENT
Case Management Progress Note    Patient name Marsha Rich  Location /-01 MRN [id number]  : 1955 Date 2021       LOS (days): 5  Geometric Mean LOS (GMLOS) (days): 3 10  Days to GMLOS:-2        OBJECTIVE:  Bundle(if applicable):    Current admission status: Inpatient  Preferred Pharmacy:   [facility]/pharmacy #[id number] - [facility] [address]  [address] [facility] [address]  Phone: [phone] Fax: [phone]    Primary Care Provider: No primary care provider on file  Primary Insurance: [facility]  Secondary Insurance: MEDICARE    PROGRESS NOTE:  Call to pharmacy concerning price for medication for Xarelto or Eloquis and both will be $45 00  Dr Samantha Akins made aware  He stated he prefers eloquis  Pharmacy made aware and pt informed of cost of meds and he agrees

## 2021-09-17 NOTE — ASSESSMENT & PLAN NOTE
· Multivessel CAD status post CABG in 2011  · Continue aspirin, ranexa, imdur, plavix, crestor equivalent  · Follows with [facility] cardiology

## 2021-09-17 NOTE — PLAN OF CARE
Problem: INFECTION - ADULT  Goal: Absence or prevention of progression during hospitalization  Description: INTERVENTIONS:  - Assess and monitor for signs and symptoms of infection  - Monitor lab/diagnostic results  - Monitor all insertion sites, i e  indwelling lines, tubes, and drains  - Monitor endotracheal if appropriate and nasal secretions for changes in amount and color  - [place] appropriate cooling/warming therapies per order  - Administer medications as ordered  - Instruct and encourage patient and family to use good hand hygiene technique  - Identify and instruct in appropriate isolation precautions for identified infection/condition  Outcome: Progressing  Goal: Absence of fever/infection during neutropenic period  Description: INTERVENTIONS:  - Monitor WBC    Outcome: Progressing

## 2021-09-17 NOTE — PROGRESS NOTES
[address] [facility]  Progress Note - Jessica Alvarez 1955, 77 y o  male MRN: [id number]  Unit/Bed#: -01 Encounter: [id number]  Primary Care Provider: No primary care provider on file  Date and time admitted to hospital: 9/12/2021  9:39 AM    * Fever  Assessment & Plan  · Further fevers at this time  · CT lung- Peripheral consolidation in right upper lobe with surrounding ground-glass; no pulmonary embolisms noted on imaging  · Etiology likely secondary to pulmonary infarct  · Covid 19 negative x2  · Blood culture x2 negative to date  · Continue to hold antibiotics at this time given procalcitonin negative x2  · Infectious disease following  · Pulmonology following  · Plan to start Eliquis 10 mg b i d  For 7 days and then 5 mg b i d   Thereafter  · Infectious workup negative thus far    Renal lesion  Assessment & Plan  · Renal lesion noted on CT abdomen pelvis  · Patient will need follow-up with outpatient MRI    Hypertension  Assessment & Plan  · Well controlled currently  · Continue home dose coreg, losartan, prn hydralazine     Varicose veins of right lower extremity  Assessment & Plan  · 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  · Venous duplex negative for dvt   · Follow up with vascular surgery as an outpatient     Chronic diastolic congestive heart failure (HCC)  Assessment & Plan  Wt Readings from Last 3 Encounters:   09/13/21 (!) 145 kg (320 lb)   02/24/14 (!) 147 kg (324 lb 4 oz)   12/13/13 (!) 144 kg (318 lb 4 9 oz)     · Last ECHO in 2020 with preserved EF  · Continue home dose lasix, monitor I/O, lytes, weight  · Does have lower extremity edema but reports baseline   · Appears euvolemic on exam    SIRS (systemic inflammatory response syndrome) ([facility] [address] )  Assessment & Plan  · Likely secondary to pulmonary infarct    Coronary artery disease  Assessment & Plan  · Multivessel CAD status post CABG in 2011  · Continue aspirin, ranexa, imdur, plavix, crestor equivalent  · Follows with [facility] cardiology         VTE Pharmacologic Prophylaxis: VTE Score: 5 High Risk (Score >/= 5) - Pharmacological DVT Prophylaxis Ordered: apixaban (Eliquis)  Sequential Compression Devices Ordered  Patient Centered Rounds: I performed bedside rounds with nursing staff today  Discussions with Specialists or Other Care Team Provider: ID, pulmonology    Education and Discussions with Family / Patient: Updated  (wife) via phone  Time Spent for Care: 30 minutes  More than 50% of total time spent on counseling and coordination of care as described above  Current Length of Stay: 5 day(s)  Current Patient Status: Inpatient   Certification Statement: The patient will continue to require additional inpatient hospital stay due to hypoxia and pulmonary infarct  Discharge Plan: Anticipate discharge tomorrow to home  Code Status: Level 1 - Full Code    Subjective:   Patient resting comfortably  Patient still having congestion and slight cough  Patient had no other complaints on exam     Objective:     Vitals:   Temp (24hrs), Av 2 °F (37 3 °C), Min:98 8 °F (37 1 °C), Max:99 6 °F (37 6 °C)    Temp:  [98 8 °F (37 1 °C)-99 6 °F (37 6 °C)] 98 8 °F (37 1 °C)  HR:  [78] 78  Resp:  [18-21] 21  BP: (112-138)/(61-69) 138/69  SpO2:  [97 %-98 %] 97 %  Body mass index is 47 26 kg/m²  Input and Output Summary (last 24 hours): Intake/Output Summary (Last 24 hours) at 2021 1444  Last data filed at 2021 1315  Gross per 24 hour   Intake 600 ml   Output 700 ml   Net -100 ml       Physical Exam:   Physical Exam  Vitals and nursing note reviewed  Constitutional:       General: He is not in acute distress  Appearance: He is well-developed  Comments: 2 L nasal cannula   HENT:      Head: Normocephalic and atraumatic  Eyes:      General: No scleral icterus  Conjunctiva/sclera: Conjunctivae normal    Cardiovascular:      Rate and Rhythm: Normal rate and regular rhythm        Heart sounds: Normal heart sounds  No murmur heard  No friction rub  No gallop  Pulmonary:      Effort: Pulmonary effort is normal  No respiratory distress  Breath sounds: Normal breath sounds  No wheezing or rales  Abdominal:      General: Bowel sounds are normal  There is no distension  Palpations: Abdomen is soft  Tenderness: There is no abdominal tenderness  Musculoskeletal:         General: Normal range of motion  Skin:     General: Skin is warm  Findings: No rash  Neurological:      Mental Status: He is alert and oriented to person, place, and time  Additional Data:     Labs:  Results from last 7 days   Lab Units 09/17/21  0448   WBC Thousand/uL 10 99*   HEMOGLOBIN g/dL 11 4*   HEMATOCRIT % 33 0*   PLATELETS Thousands/uL 277   NEUTROS PCT % 72   LYMPHS PCT % 12*   MONOS PCT % 14*   EOS PCT % 1     Results from last 7 days   Lab Units 09/17/21  0448 09/15/21  0635   SODIUM mmol/L 132* 133*   POTASSIUM mmol/L 3 9 3 8   CHLORIDE mmol/L 95* 99*   CO2 mmol/L 29 24   BUN mg/dL 15 16   CREATININE mg/dL 0 98 0 92   ANION GAP mmol/L 8 10   CALCIUM mg/dL 8 6 8 3   ALBUMIN g/dL  --  2 6*   TOTAL BILIRUBIN mg/dL  --  0 69   ALK PHOS U/L  --  67   ALT U/L  --  67   AST U/L  --  59*   GLUCOSE RANDOM mg/dL 105 113     Results from last 7 days   Lab Units 09/12/21  0957   INR  1 10             Results from last 7 days   Lab Units 09/13/21  0609 09/12/21  0957   LACTIC ACID mmol/L  --  0 7   PROCALCITONIN ng/ml 0 10 0 08       Lines/Drains:  Invasive Devices     Peripheral Intravenous Line            Peripheral IV 09/16/21 Left Forearm <1 day                      Imaging: No pertinent imaging reviewed  Recent Cultures (last 7 days):   Results from last 7 days   Lab Units 09/12/21  0957   BLOOD CULTURE  No Growth After 4 Days  No Growth After 4 Days         Last 24 Hours Medication List:   Current Facility-Administered Medications   Medication Dose Route Frequency Provider Last Rate    acetaminophen  650 mg Oral Q6H PRN Nilsa Serrano MD      apixaban  10 mg Oral BID Lin Mcleod DO      atorvastatin  40 mg Oral Daily With Sudhir Grimm MD      calcium carbonate  1,000 mg Oral Daily PRN Nilsa Serrano MD      carvedilol  25 mg Oral BID With Meals Nilsa Serrano MD      clopidogrel  75 mg Oral Daily Nilsa Serrano MD      docusate sodium  100 mg Oral BID Nilsa Serrano MD      FLUoxetine  40 mg Oral Daily Nilsa Serrano MD      furosemide  40 mg Oral Daily Nilsa Serrano MD      gabapentin  400 mg Oral HS Nilsa Serrano MD      guaiFENesin  200 mg Oral Q4H PRN Lin Mcleod DO      hydrALAZINE  5 mg Intravenous Q6H PRN Nilsa Serrano MD      HYDROmorphone  0 5 mg Intravenous Q6H PRN Nilsa Serrano MD      isosorbide mononitrate  60 mg Oral Daily Nilsa Serrano MD      ketorolac  15 mg Intravenous Q6H PRN Lin Mcleod DO      loratadine  10 mg Oral Daily Lin Mcleod DO      losartan  100 mg Oral Daily Nilsa Serrano MD      ondansetron  4 mg Intravenous Q6H PRN Nilsa Serrano MD      oxyCODONE  2 5 mg Oral Q4H PRN Lin Mcleod DO      ranolazine  1,000 mg Oral Q12H [address] Nilsa Serrano MD      senna  1 tablet Oral Daily Nilsa Serrano MD      sodium chloride  1 spray Each Nare Q1H PRN Lin Mcleod DO          Today, Patient Was Seen By: Lin Mcleod DO    **Please Note: This note may have been constructed using a voice recognition system  **

## 2021-09-17 NOTE — ASSESSMENT & PLAN NOTE
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

## 2021-09-18 PROBLEM — I26.99 PULMONARY INFARCT (HCC): Status: ACTIVE | Noted: 2021-09-12

## 2021-09-18 LAB
ANION GAP SERPL CALCULATED.3IONS-SCNC: 9 MMOL/L (ref 4–13)
BACTERIA BLD CULT: NORMAL
BACTERIA BLD CULT: NORMAL
BASOPHILS # BLD AUTO: 0.04 THOUSANDS/ΜL (ref 0–0.1)
BASOPHILS NFR BLD AUTO: 0 % (ref 0–1)
BUN SERPL-MCNC: 14 MG/DL (ref 5–25)
CALCIUM SERPL-MCNC: 8.8 MG/DL (ref 8.3–10.1)
CHLORIDE SERPL-SCNC: 97 MMOL/L (ref 100–108)
CO2 SERPL-SCNC: 29 MMOL/L (ref 21–32)
CREAT SERPL-MCNC: 0.86 MG/DL (ref 0.6–1.3)
EOSINOPHIL # BLD AUTO: 0.08 THOUSAND/ΜL (ref 0–0.61)
EOSINOPHIL NFR BLD AUTO: 1 % (ref 0–6)
ERYTHROCYTE [DISTWIDTH] IN BLOOD BY AUTOMATED COUNT: 14.1 % (ref 11.6–15.1)
GFR SERPL CREATININE-BSD FRML MDRD: 90 ML/MIN/1.73SQ M
GLUCOSE SERPL-MCNC: 96 MG/DL (ref 65–140)
HCT VFR BLD AUTO: 34.4 % (ref 36.5–49.3)
HGB BLD-MCNC: 11.8 G/DL (ref 12–17)
IMM GRANULOCYTES # BLD AUTO: 0.18 THOUSAND/UL (ref 0–0.2)
IMM GRANULOCYTES NFR BLD AUTO: 2 % (ref 0–2)
LYMPHOCYTES # BLD AUTO: 1.41 THOUSANDS/ΜL (ref 0.6–4.47)
LYMPHOCYTES NFR BLD AUTO: 15 % (ref 14–44)
MCH RBC QN AUTO: 30.6 PG (ref 26.8–34.3)
MCHC RBC AUTO-ENTMCNC: 34.3 G/DL (ref 31.4–37.4)
MCV RBC AUTO: 89 FL (ref 82–98)
MONOCYTES # BLD AUTO: 1.48 THOUSAND/ΜL (ref 0.17–1.22)
MONOCYTES NFR BLD AUTO: 15 % (ref 4–12)
NEUTROPHILS # BLD AUTO: 6.48 THOUSANDS/ΜL (ref 1.85–7.62)
NEUTS SEG NFR BLD AUTO: 67 % (ref 43–75)
NRBC BLD AUTO-RTO: 0 /100 WBCS
PLATELET # BLD AUTO: 331 THOUSANDS/UL (ref 149–390)
PMV BLD AUTO: 9.9 FL (ref 8.9–12.7)
POTASSIUM SERPL-SCNC: 3.8 MMOL/L (ref 3.5–5.3)
RBC # BLD AUTO: 3.86 MILLION/UL (ref 3.88–5.62)
SODIUM SERPL-SCNC: 135 MMOL/L (ref 136–145)
WBC # BLD AUTO: 9.67 THOUSAND/UL (ref 4.31–10.16)

## 2021-09-18 PROCEDURE — 80048 BASIC METABOLIC PNL TOTAL CA: CPT | Performed by: INTERNAL MEDICINE

## 2021-09-18 PROCEDURE — 94761 N-INVAS EAR/PLS OXIMETRY MLT: CPT

## 2021-09-18 PROCEDURE — 99232 SBSQ HOSP IP/OBS MODERATE 35: CPT | Performed by: INTERNAL MEDICINE

## 2021-09-18 PROCEDURE — 85025 COMPLETE CBC W/AUTO DIFF WBC: CPT | Performed by: INTERNAL MEDICINE

## 2021-09-18 RX ORDER — LOPERAMIDE HYDROCHLORIDE 2 MG/1
2 CAPSULE ORAL 3 TIMES DAILY PRN
Status: DISCONTINUED | OUTPATIENT
Start: 2021-09-18 | End: 2021-09-19 | Stop reason: HOSPADM

## 2021-09-18 RX ORDER — MAGNESIUM SULFATE HEPTAHYDRATE 40 MG/ML
2 INJECTION, SOLUTION INTRAVENOUS ONCE
Status: COMPLETED | OUTPATIENT
Start: 2021-09-18 | End: 2021-09-18

## 2021-09-18 RX ADMIN — CARVEDILOL 25 MG: 12.5 TABLET, FILM COATED ORAL at 17:07

## 2021-09-18 RX ADMIN — GABAPENTIN 400 MG: 400 CAPSULE ORAL at 21:23

## 2021-09-18 RX ADMIN — RANOLAZINE 1000 MG: 500 TABLET, FILM COATED, EXTENDED RELEASE ORAL at 21:23

## 2021-09-18 RX ADMIN — RANOLAZINE 1000 MG: 500 TABLET, FILM COATED, EXTENDED RELEASE ORAL at 08:54

## 2021-09-18 RX ADMIN — MAGNESIUM SULFATE HEPTAHYDRATE 2 G: 40 INJECTION, SOLUTION INTRAVENOUS at 17:07

## 2021-09-18 RX ADMIN — CLOPIDOGREL BISULFATE 75 MG: 75 TABLET ORAL at 08:54

## 2021-09-18 RX ADMIN — APIXABAN 10 MG: 5 TABLET, FILM COATED ORAL at 08:54

## 2021-09-18 RX ADMIN — FUROSEMIDE 40 MG: 40 TABLET ORAL at 08:54

## 2021-09-18 RX ADMIN — SALINE NASAL SPRAY 1 SPRAY: 1.5 SOLUTION NASAL at 08:55

## 2021-09-18 RX ADMIN — CARVEDILOL 25 MG: 12.5 TABLET, FILM COATED ORAL at 09:01

## 2021-09-18 RX ADMIN — HYDROMORPHONE HYDROCHLORIDE 0.5 MG: 1 INJECTION, SOLUTION INTRAMUSCULAR; INTRAVENOUS; SUBCUTANEOUS at 23:58

## 2021-09-18 RX ADMIN — LOPERAMIDE HYDROCHLORIDE 2 MG: 2 CAPSULE ORAL at 17:14

## 2021-09-18 RX ADMIN — GUAIFENESIN 200 MG: 100 SOLUTION ORAL at 21:23

## 2021-09-18 RX ADMIN — FLUOXETINE HYDROCHLORIDE 40 MG: 10 CAPSULE ORAL at 08:55

## 2021-09-18 RX ADMIN — GUAIFENESIN 200 MG: 100 SOLUTION ORAL at 08:54

## 2021-09-18 RX ADMIN — LOSARTAN POTASSIUM 100 MG: 50 TABLET, FILM COATED ORAL at 08:54

## 2021-09-18 RX ADMIN — GUAIFENESIN 200 MG: 100 SOLUTION ORAL at 17:07

## 2021-09-18 RX ADMIN — LORATADINE 10 MG: 10 TABLET ORAL at 08:54

## 2021-09-18 RX ADMIN — ISOSORBIDE MONONITRATE 60 MG: 60 TABLET, EXTENDED RELEASE ORAL at 08:54

## 2021-09-18 RX ADMIN — DOCUSATE SODIUM 100 MG: 100 CAPSULE, LIQUID FILLED ORAL at 17:07

## 2021-09-18 RX ADMIN — ATORVASTATIN CALCIUM 40 MG: 40 TABLET, FILM COATED ORAL at 17:07

## 2021-09-18 RX ADMIN — APIXABAN 10 MG: 5 TABLET, FILM COATED ORAL at 17:07

## 2021-09-18 NOTE — RESPIRATORY THERAPY NOTE
Home Oxygen Qualifying Test       Patient name: Otto Okeefe        : 1955   Date of Test:  2021  Diagnosis: Coronary Artery Disease     Home Oxygen Test:    **Medicare Guidelines require item(s) 1-5 on all ambulatory patients or 1 and 2 on non-ambulatory patients  1   Baseline SPO2 on Room Air at rest 94 %  2   SPO2 during exercise on Room Air 91 %  During exercise monitor SpO2  If SPO2 increases >=89% with ambulation do not add supplemental             oxygen  If <= 88% on room air add O2 via NC and titrate patient  Patient must be ambulated with O2 and titrated to > 88% with exertion  3   Exercise performed:          walking, duration 4 (min)          []  Supplemental Home Oxygen is indicated  [x]  Client does not qualify for home oxygen        Respiratory Additional Notes-     Amita Schmidt, RT

## 2021-09-18 NOTE — ASSESSMENT & PLAN NOTE
· Patient remains afebrile  · CT lung- Peripheral consolidation in right upper lobe with surrounding ground-glass; no pulmonary embolisms noted on imaging  · Etiology likely secondary to pulmonary infarct  · Covid 19 negative x2  · Blood culture x2 no growth 5 days  · Infectious disease following  · Eliquis 10 mg b i d  For 7 days and then 5 mg b i d  Thereafter  · Infectious workup negative thus far  · Will monitor today and have evaluated by PT and OT to assess for need on rehab    If no needs for rehab will plan to discharge tomorrow  · Patient evaluated by respiratory therapy and does not need home oxygen  · Patient will need outpatient pulmonology follow-up

## 2021-09-18 NOTE — ASSESSMENT & PLAN NOTE
· Multivessel CAD status post CABG in 2011  · Continue aspirin, ranexa, imdur, plavix, crestor equivalent  · Follows with [facility] cardiology

## 2021-09-18 NOTE — PROGRESS NOTES
[address] [facility]  Progress Note - Jessica Alvarez 1955, 77 y o  male MRN: [id number]  Unit/Bed#: -01 Encounter: [id number]  Primary Care Provider: No primary care provider on file  Date and time admitted to hospital: 9/12/2021  9:39 AM    * Pulmonary infarct [facility])  Assessment & Plan  · Patient remains afebrile  · CT lung- Peripheral consolidation in right upper lobe with surrounding ground-glass; no pulmonary embolisms noted on imaging  · Etiology likely secondary to pulmonary infarct  · Covid 19 negative x2  · Blood culture x2 no growth 5 days  · Infectious disease following  · Eliquis 10 mg b i d  For 7 days and then 5 mg b i d  Thereafter  · Infectious workup negative thus far  · Will monitor today and have evaluated by PT and OT to assess for need on rehab    If no needs for rehab will plan to discharge tomorrow  · Patient evaluated by respiratory therapy and does not need home oxygen  · Patient will need outpatient pulmonology follow-up    Renal lesion  Assessment & Plan  · Renal lesion noted on CT abdomen pelvis  · Patient will need follow-up with outpatient MRI    Hypertension  Assessment & Plan  · Well controlled currently  · Continue home dose coreg, losartan, prn hydralazine     Varicose veins of right lower extremity  Assessment & Plan  · 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  · Venous duplex negative for dvt   · Follow up with vascular surgery as an outpatient     Chronic diastolic congestive heart failure (HCC)  Assessment & Plan  Wt Readings from Last 3 Encounters:   09/13/21 (!) 145 kg (320 lb)   02/24/14 (!) 147 kg (324 lb 4 oz)   12/13/13 (!) 144 kg (318 lb 4 9 oz)     · Last ECHO in 2020 with preserved EF  · Continue home dose lasix, monitor I/O, lytes, weight  · Does have lower extremity edema but reports baseline   · Appears euvolemic on exam    SIRS (systemic inflammatory response syndrome) (HCC)  Assessment & Plan  · Likely secondary to pulmonary infarct    Coronary artery disease  Assessment & Plan  · Multivessel CAD status post CABG in 2011  · Continue aspirin, ranexa, imdur, plavix, crestor equivalent  · Follows with [facility] cardiology         VTE Pharmacologic Prophylaxis: VTE Score: 5 High Risk (Score >/= 5) - Pharmacological DVT Prophylaxis Ordered: apixaban (Eliquis)  Sequential Compression Devices Ordered  Patient Centered Rounds: I performed bedside rounds with nursing staff today  Discussions with Specialists or Other Care Team Provider: none    Education and Discussions with Family / Patient: Updated  (wife) via phone  Time Spent for Care: 30 minutes  More than 50% of total time spent on counseling and coordination of care as described above  Current Length of Stay: 6 day(s)  Current Patient Status: Inpatient   Certification Statement: The patient will continue to require additional inpatient hospital stay due to Hypoxia likely secondary to pulmonary infarct  Discharge Plan: Anticipate discharge tomorrow to discharge location to be determined pending rehab evaluations  Code Status: Level 1 - Full Code    Subjective:   Patient resting comfortably on examination  Patient did have slight bloody nose when waking up this morning, but had resolved when I saw patient  Patient states that breathing is improved compared to prior days  Objective:     Vitals:   Temp (24hrs), Av 7 °F (37 1 °C), Min:98 6 °F (37 °C), Max:98 8 °F (37 1 °C)    Temp:  [98 6 °F (37 °C)-98 8 °F (37 1 °C)] 98 6 °F (37 °C)  HR:  [78-88] 88  Resp:  [18-20] 18  BP: (130-157)/(55-76) 157/76  SpO2:  [94 %-96 %] 95 %  Body mass index is 47 26 kg/m²  Input and Output Summary (last 24 hours): Intake/Output Summary (Last 24 hours) at 2021 1437  Last data filed at 2021 0913  Gross per 24 hour   Intake 240 ml   Output 401 ml   Net -161 ml       Physical Exam:   Physical Exam  Vitals and nursing note reviewed     Constitutional:       General: He is not in acute distress  Appearance: He is well-developed  Comments: 2 L nasal cannula   HENT:      Head: Normocephalic and atraumatic  Eyes:      General: No scleral icterus  Conjunctiva/sclera: Conjunctivae normal    Cardiovascular:      Rate and Rhythm: Normal rate and regular rhythm  Heart sounds: Normal heart sounds  No murmur heard  No friction rub  No gallop  Pulmonary:      Effort: Pulmonary effort is normal  No respiratory distress  Breath sounds: Normal breath sounds  No wheezing or rales  Abdominal:      General: Bowel sounds are normal  There is no distension  Palpations: Abdomen is soft  Tenderness: There is no abdominal tenderness  Musculoskeletal:         General: Normal range of motion  Skin:     General: Skin is warm  Findings: No rash  Neurological:      Mental Status: He is alert and oriented to person, place, and time            Additional Data:     Labs:  Results from last 7 days   Lab Units 09/18/21  0446   WBC Thousand/uL 9 67   HEMOGLOBIN g/dL 11 8*   HEMATOCRIT % 34 4*   PLATELETS Thousands/uL 331   NEUTROS PCT % 67   LYMPHS PCT % 15   MONOS PCT % 15*   EOS PCT % 1     Results from last 7 days   Lab Units 09/18/21  0446 09/15/21  0635   SODIUM mmol/L 135* 133*   POTASSIUM mmol/L 3 8 3 8   CHLORIDE mmol/L 97* 99*   CO2 mmol/L 29 24   BUN mg/dL 14 16   CREATININE mg/dL 0 86 0 92   ANION GAP mmol/L 9 10   CALCIUM mg/dL 8 8 8 3   ALBUMIN g/dL  --  2 6*   TOTAL BILIRUBIN mg/dL  --  0 69   ALK PHOS U/L  --  67   ALT U/L  --  67   AST U/L  --  59*   GLUCOSE RANDOM mg/dL 96 113     Results from last 7 days   Lab Units 09/12/21  0957   INR  1 10             Results from last 7 days   Lab Units 09/13/21  0609 09/12/21  0957   LACTIC ACID mmol/L  --  0 7   PROCALCITONIN ng/ml 0 10 0 08       Lines/Drains:  Invasive Devices     Peripheral Intravenous Line            Peripheral IV 09/16/21 Left Forearm 1 day                      Imaging: No pertinent imaging reviewed  Recent Cultures (last 7 days):   Results from last 7 days   Lab Units 09/17/21  0950 09/12/21  0957   BLOOD CULTURE   --  No Growth After 5 Days  No Growth After 5 Days  LEGIONELLA URINARY ANTIGEN  Negative  --        Last 24 Hours Medication List:   Current Facility-Administered Medications   Medication Dose Route Frequency Provider Last Rate    acetaminophen  650 mg Oral Q6H PRN Nilsa Serrano MD      apixaban  10 mg Oral BID Lin Mcleod DO      atorvastatin  40 mg Oral Daily With Sudhir Grimm MD      benzonatate  100 mg Oral TID PRN Lin Mcleod DO      calcium carbonate  1,000 mg Oral Daily PRN Nilsa Serrano MD      carvedilol  25 mg Oral BID With Meals Nilsa Serrano MD      clopidogrel  75 mg Oral Daily Nilsa Serrano MD      docusate sodium  100 mg Oral BID Nilsa Serrano MD      FLUoxetine  40 mg Oral Daily Nilsa Serrano MD      furosemide  40 mg Oral Daily Nilsa Serrano MD      gabapentin  400 mg Oral HS Nilsa Serrano MD      guaiFENesin  200 mg Oral Q4H PRN Lin Mcleod DO      hydrALAZINE  5 mg Intravenous Q6H PRN Nilsa Serrano MD      HYDROmorphone  0 5 mg Intravenous Q6H PRN Nilsa Serrano MD      isosorbide mononitrate  60 mg Oral Daily Nilsa Serrano MD      loratadine  10 mg Oral Daily Lin Mcleod,       losartan  100 mg Oral Daily Nilsa Serrano MD      ondansetron  4 mg Intravenous Q6H PRN Nilsa Serrano MD      oxyCODONE  2 5 mg Oral Q4H PRN Lin Mcleod DO      ranolazine  1,000 mg Oral Q12H [address] Nilsa Serrano MD      senna  1 tablet Oral Daily Nilsa Serrano MD      sodium chloride  1 spray Each Nare Q1H PRN Lin Mcleod DO          Today, Patient Was Seen By: Lin Mcleod DO    **Please Note: This note may have been constructed using a voice recognition system  **

## 2021-09-19 VITALS
RESPIRATION RATE: 22 BRPM | SYSTOLIC BLOOD PRESSURE: 150 MMHG | HEART RATE: 58 BPM | BODY MASS INDEX: 46.65 KG/M2 | HEIGHT: 69 IN | OXYGEN SATURATION: 92 % | WEIGHT: 315 LBS | TEMPERATURE: 98.3 F | DIASTOLIC BLOOD PRESSURE: 71 MMHG

## 2021-09-19 LAB
BASOPHILS # BLD MANUAL: 0 THOUSAND/UL (ref 0–0.1)
BASOPHILS NFR MAR MANUAL: 0 % (ref 0–1)
EOSINOPHIL # BLD MANUAL: 0.1 THOUSAND/UL (ref 0–0.4)
EOSINOPHIL NFR BLD MANUAL: 1 % (ref 0–6)
ERYTHROCYTE [DISTWIDTH] IN BLOOD BY AUTOMATED COUNT: 14.1 % (ref 11.6–15.1)
HCT VFR BLD AUTO: 32.6 % (ref 36.5–49.3)
HGB BLD-MCNC: 11.3 G/DL (ref 12–17)
LYMPHOCYTES # BLD AUTO: 2.62 THOUSAND/UL (ref 0.6–4.47)
LYMPHOCYTES # BLD AUTO: 27 % (ref 14–44)
MCH RBC QN AUTO: 31.1 PG (ref 26.8–34.3)
MCHC RBC AUTO-ENTMCNC: 34.7 G/DL (ref 31.4–37.4)
MCV RBC AUTO: 90 FL (ref 82–98)
MONOCYTES # BLD AUTO: 1.55 THOUSAND/UL (ref 0–1.22)
MONOCYTES NFR BLD: 16 % (ref 4–12)
NEUTROPHILS # BLD MANUAL: 5.43 THOUSAND/UL (ref 1.85–7.62)
NEUTS BAND NFR BLD MANUAL: 2 % (ref 0–8)
NEUTS SEG NFR BLD AUTO: 54 % (ref 43–75)
PLATELET # BLD AUTO: 342 THOUSANDS/UL (ref 149–390)
PLATELET BLD QL SMEAR: ADEQUATE
PMV BLD AUTO: 9.7 FL (ref 8.9–12.7)
RBC # BLD AUTO: 3.63 MILLION/UL (ref 3.88–5.62)
WBC # BLD AUTO: 9.7 THOUSAND/UL (ref 4.31–10.16)

## 2021-09-19 PROCEDURE — 97166 OT EVAL MOD COMPLEX 45 MIN: CPT

## 2021-09-19 PROCEDURE — 85007 BL SMEAR W/DIFF WBC COUNT: CPT | Performed by: INTERNAL MEDICINE

## 2021-09-19 PROCEDURE — 97162 PT EVAL MOD COMPLEX 30 MIN: CPT

## 2021-09-19 PROCEDURE — 99239 HOSP IP/OBS DSCHRG MGMT >30: CPT | Performed by: INTERNAL MEDICINE

## 2021-09-19 PROCEDURE — 85027 COMPLETE CBC AUTOMATED: CPT | Performed by: INTERNAL MEDICINE

## 2021-09-19 RX ORDER — ATORVASTATIN CALCIUM 40 MG/1
40 TABLET, FILM COATED ORAL
Qty: 30 TABLET | Refills: 0 | Status: SHIPPED | OUTPATIENT
Start: 2021-09-19 | End: 2021-11-02 | Stop reason: SDUPTHER

## 2021-09-19 RX ORDER — LORATADINE 10 MG/1
10 TABLET ORAL DAILY
Qty: 30 TABLET | Refills: 0 | Status: SHIPPED | OUTPATIENT
Start: 2021-09-20 | End: 2021-11-02

## 2021-09-19 RX ORDER — BENZONATATE 100 MG/1
100 CAPSULE ORAL 3 TIMES DAILY PRN
Qty: 20 CAPSULE | Refills: 0 | Status: SHIPPED | OUTPATIENT
Start: 2021-09-19 | End: 2021-09-23 | Stop reason: ALTCHOICE

## 2021-09-19 RX ORDER — LOSARTAN POTASSIUM 100 MG/1
100 TABLET ORAL DAILY
Refills: 0
Start: 2021-09-20 | End: 2021-11-02 | Stop reason: SDUPTHER

## 2021-09-19 RX ORDER — RANOLAZINE 1000 MG/1
1000 TABLET, EXTENDED RELEASE ORAL EVERY 12 HOURS SCHEDULED
Refills: 0
Start: 2021-09-19 | End: 2021-11-02 | Stop reason: SDUPTHER

## 2021-09-19 RX ORDER — ECHINACEA PURPUREA EXTRACT 125 MG
1 TABLET ORAL AS NEEDED
Qty: 37.5 ML | Refills: 0 | Status: SHIPPED | OUTPATIENT
Start: 2021-09-19 | End: 2021-11-02

## 2021-09-19 RX ORDER — LOPERAMIDE HYDROCHLORIDE 2 MG/1
2 CAPSULE ORAL 3 TIMES DAILY PRN
Qty: 30 CAPSULE | Refills: 0 | Status: SHIPPED | OUTPATIENT
Start: 2021-09-19 | End: 2021-11-02

## 2021-09-19 RX ADMIN — DOCUSATE SODIUM 100 MG: 100 CAPSULE, LIQUID FILLED ORAL at 09:52

## 2021-09-19 RX ADMIN — CARVEDILOL 25 MG: 12.5 TABLET, FILM COATED ORAL at 09:52

## 2021-09-19 RX ADMIN — FUROSEMIDE 40 MG: 40 TABLET ORAL at 09:51

## 2021-09-19 RX ADMIN — FLUOXETINE HYDROCHLORIDE 40 MG: 10 CAPSULE ORAL at 09:51

## 2021-09-19 RX ADMIN — LOSARTAN POTASSIUM 100 MG: 50 TABLET, FILM COATED ORAL at 09:51

## 2021-09-19 RX ADMIN — CLOPIDOGREL BISULFATE 75 MG: 75 TABLET ORAL at 09:52

## 2021-09-19 RX ADMIN — APIXABAN 10 MG: 5 TABLET, FILM COATED ORAL at 09:52

## 2021-09-19 RX ADMIN — STANDARDIZED SENNA CONCENTRATE 8.6 MG: 8.6 TABLET ORAL at 09:52

## 2021-09-19 RX ADMIN — RANOLAZINE 1000 MG: 500 TABLET, FILM COATED, EXTENDED RELEASE ORAL at 09:51

## 2021-09-19 RX ADMIN — LORATADINE 10 MG: 10 TABLET ORAL at 09:51

## 2021-09-19 RX ADMIN — ISOSORBIDE MONONITRATE 60 MG: 60 TABLET, EXTENDED RELEASE ORAL at 09:52

## 2021-09-19 NOTE — OCCUPATIONAL THERAPY NOTE
Occupational Therapy Evaluation        Patient Name: Irina Banuelos  VCQAW'R Date: 9/19/2021 09/19/21 0920   OT Last Visit   OT Visit Date 09/19/21   Note Type   Note type Evaluation   Restrictions/Precautions   Weight Bearing Precautions Per Order No   Braces or Orthoses Other (Comment)  (h/o right knee hinged brace PRN)   Other Precautions Multiple lines; Fall Risk;O2   Pain Assessment   Pain Assessment Tool Pain Assessment not indicated - pt denies pain   Pain Score No Pain   Home Living   Type of Home House   Home Layout Stairs to enter with rails; Two level;Bed/bath upstairs  (3 LISSETTE; 16 steps to 2nd floor)   Bathroom Shower/Tub Tub/shower unit   [employer] Raised   [employer] Grab bars in shower; Shower chair   Bathroom Accessibility Accessible   Home Equipment Quad cane   Additional Comments Pt ambulates with quad cane  Prior Function   Level of [place] Independent with ADLs and functional mobility   Lives With Spouse   Receives Help From Family   ADL Assistance Independent   IADLs Independent   Falls in the last 6 months 0   Vocational Full time employment   Lifestyle   Autonomy Patient reported independnet with ADLs/ IADLs, lives in a 2 story house with 3 LISSETTE; 16 steps to 2nd floor  patient ambulates with quad cane, drives and works as a       Reciprocal Relationships Supportive family   Service to Others Full time work as a    Psychosocial   Psychosocial (WDL) WDL   ADL   Eating Assistance 7  3 \[facility]"" [address]  [address] [place] [address] 5  Supervision/Setup   LB [address] 5  [facility]  6  Modified independent   [address] [facility] 5  [address]  5  [address] [facility] 5  Supervision/Setup   Bed Mobility   Additional Comments received patient sitting at edge of bed, reported he has been walking independnetly in room and bathroom using Quad cane  Transfers   Sit to Stand 6  Modified independent   Additional items Other  (AD- quad cane)   Stand to Sit 6  Modified independent   Additional items Other  (AD Quad Cane)   Functional Mobility   Functional Mobility 5  Supervision   Additional items   (Quad cane)   Balance   Static Sitting Normal   Dynamic Sitting Good   Static Standing Fair +   Dynamic Standing Fair   Activity Tolerance   Activity Tolerance Patient limited by pain; Patient limited by fatigue   RUE Assessment   RUE Assessment X  (bilateral shoulder limitations due to OA)   RUE Overall AROM   R Shoulder Flexion ~80 degrees of shoulder flexion bilaterally, unable to reach top of head or behind the neck   LUE Assessment   LUE Assessment X  (bilateral shoulder limitations due to OA)   LUE Overall AROM   L Shoulder Flexion ~80 degrees of shoulder flexion bilaterally, unable to reach top of head or behind the neck   Hand Function   Gross Motor Coordination Functional   Fine Motor Coordination Functional   Sensation   Light Touch No apparent deficits  (BUEs)   Vision-Basic Assessment   Current Vision Wears glasses only for reading   Patient Visual Report Other (Comment)  (No significant changes reported)   Cognition   Overall Cognitive Status WFL   Arousal/Participation Alert; Responsive; Cooperative   Attention Within functional limits   Orientation Level Oriented X4   Memory Within functional limits   Following Commands Follows all commands and directions without difficulty   Assessment   Limitation Decreased UE ROM; Decreased ADL status; Decreased endurance;Decreased high-level ADLs   Prognosis Good   Assessment Patient is a 77 y o  male seen for OT evaluation s/p admit to [address] [facility] on 9/12/2021 w/Pulmonary infarct [facility])   Commorbidities affecting patient's functional performance at time of assessment include: Community acquired Pneumonia, Varicose veins of the lower extremity, HTN,  SIRS, CHF, CAD, presented to ED with SOB  Orders placed for OT evaluation and treatment  Performed at least two patient identifiers during session including name and wristband  Prior to admission, Patient reported independnet with ADLs/ IADLs, lives in a 2 story house with 3 LISSETTE; 16 steps to 2nd floor  patient ambulates with quad cane, drives and works as a   Personal factors affecting patient at time of initial evaluation include: steps to enter  Upon evaluation, patient requires modified independent assist for UB ADLs, supervision and set up assist for LB ADLs, transfers and functional ambulation in room and bathroom with modified independent assist, with the use of [employer]  Patient is alert and oriented x 4  Occupational performance is affected by the following deficits: degenerative arthritic joint changes, dynamic sit/ stand balance deficit with poor standing tolerance time for self care and functional mobility and decreased activity tolerance  Patient to benefit from continued Occupational Therapy treatment while in the hospital to address deficits as defined above and maximize level of functional independence with ADLs and functional mobility  Occupational Performance areas to address include: bathing/ shower, transfer to all surfaces, functional mobility and community mobility  From OT standpoint, recommendation at time of d/c would be Home with family support and [address] [place] [address]  Plan   Treatment Interventions ADL retraining; Endurance training; Compensatory technique education; Energy conservation;Continued evaluation   Goal Expiration Date 09/26/21   OT Frequency 1-2x/wk   Recommendation   OT Discharge Recommendation Home with home health rehabilitation   AM-PAC Daily Activity Inpatient   Lower Body Dressing 2   Bathing 3   Toileting 3   Upper Body Dressing 3   Grooming 4   Eating 4   Daily Activity Raw Score 19   Daily Activity Standardized Score (Calc for Raw Score >=11) 40 22   AM-PAC Applied Cognition Inpatient   Following a Speech/Presentation 4   Understanding Ordinary Conversation 4   Taking Medications 4   Remembering Where Things Are Placed or Put Away 4   Remembering List of 4-5 Errands 4   Taking Care of Complicated Tasks 4   Applied Cognition Raw Score 24   Applied Cognition Standardized Score 62 21       Occupational therapy Goals: In 2-4 days    1- Patient will verbalize and demonstrate use of energy conservation/ deep breathing technique and work simplification skills during functional activity with no verbal cues  2- Patient will verbalize and demonstrate good body mechanics and joint protection techniques during ADLs/ IADLs with no verbal cues   3- Patient will increase OOB/ sitting tolerance to 4-6 hours per day for increased participation in self care and leisure tasks with no s/s of exertion  4- Patient will identify s/s of exertion during ADL and functional mobility with no verbal cues    5-Patient will verbalize/ demonstrate compensatory strategies to recover from exertion with no verbal cues  6-Patient will increase standing tolerance time to 10 minutes with No UE support to complete sink level ADLs @ Mod I level   7- Patient will increase sitting tolerance at edge of bed to 30 minutes to complete UB ADLs @ Indep  level     8-- Patient/ Family will demonstrate competency with UE Home Exercise Program

## 2021-09-19 NOTE — ASSESSMENT & PLAN NOTE
· Patient remains afebrile  · CT lung- Peripheral consolidation in right upper lobe with surrounding ground-glass; no pulmonary embolisms noted on imaging  · Etiology likely secondary to pulmonary infarct  · Covid 19 negative x2; Legionella and strep negative  · Blood culture x2 no growth 5 days  · Eliquis 10 mg b i d  For 7 days and then 5 mg b i d   Thereafter  · Infectious workup negative thus far  · Patient evaluated by PT and OT and recommended for outpatient home health  · Patient evaluated by respiratory therapy and does not need home oxygen  · Encouraged incentive spirometry  · Patient will need outpatient pulmonology follow-up  · Patient will also need repeat CT imaging in 4-6 weeks  · Repeat CBC in 1 week to follow up with primary care provider

## 2021-09-19 NOTE — DISCHARGE SUMMARY
[facility]  Discharge- Gene Marii Falling 1955, 77 y o  male MRN: [id number]  Unit/Bed#: -01 Encounter: [id number]  Primary Care Provider: No primary care provider on file  Date and time admitted to hospital: 9/12/2021  9:39 AM    * Pulmonary infarct [facility])  Assessment & Plan  · Patient remains afebrile  · CT lung- Peripheral consolidation in right upper lobe with surrounding ground-glass; no pulmonary embolisms noted on imaging  · Etiology likely secondary to pulmonary infarct  · Covid 19 negative x2; Legionella and strep negative  · Blood culture x2 no growth 5 days  · Eliquis 10 mg b i d  For 7 days and then 5 mg b i d   Thereafter  · Infectious workup negative thus far  · Patient evaluated by PT and OT and recommended for outpatient home health  · Patient evaluated by respiratory therapy and does not need home oxygen  · Encouraged incentive spirometry  · Patient will need outpatient pulmonology follow-up  · Patient will also need repeat CT imaging in 4-6 weeks    Renal lesion  Assessment & Plan  · Renal lesion noted on CT abdomen pelvis  · Patient will need follow-up with outpatient MRI; patient is aware    Hypertension  Assessment & Plan  · Well controlled currently  · Continue home dose coreg, losartan    Varicose veins of right lower extremity  Assessment & Plan  · 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  · Venous duplex negative for dvt   · Follow up with vascular surgery as an outpatient     Chronic diastolic congestive heart failure (HCC)  Assessment & Plan  Wt Readings from Last 3 Encounters:   09/13/21 (!) 145 kg (320 lb)   02/24/14 (!) 147 kg (324 lb 4 oz)   12/13/13 (!) 144 kg (318 lb 4 9 oz)     · Last ECHO in 2020 with preserved EF  · Continue home dose lasix, monitor I/O, lytes, weight  · Does have lower extremity edema but reports baseline   · Appears euvolemic on exam    SIRS (systemic inflammatory response syndrome) ([facility] [address] )  Assessment & Plan  · Likely secondary to pulmonary infarct    Coronary artery disease  Assessment & Plan  · Multivessel CAD status post CABG in 2011  · Continue ranexa, imdur, plavix, crestor equivalent  · Aspirin discontinued given patient will start Eliquis; did tell patient to discuss with cardiologist outside hospital  · Follows with [facility] cardiology         Medical Problems         Discharging Physician / Practitioner: Srikanth Andrew DO  PCP: No primary care provider on file  Admission Date:   Admission Orders (From admission, onward)     Ordered        09/12/21 1228  Inpatient Admission  Once                   Discharge Date: 09/19/21    Consultations During Hospital Stay:  · Pulmonology, Infectious Disease    Procedures Performed:   · None    Significant Findings / Test Results:   · As noted above    Incidental Findings:   · As noted above     Test Results Pending at Discharge (will require follow up): · None     Outpatient Tests Requested:  · None    Complications:  None    Reason for Admission:  Shortness of breath    Hospital Course:   Otto Goyal is a 77 y o  male patient who originally presented to the hospital on 9/12/2021 due to shortness of breath  Initially it was thought patient had pneumonia, but upon further investigation procalcitonin were negative and white count improved  Did have pulmonology and Infectious Disease weigh in on the case and it was thought most likely cause was pulmonary infarct given imaging findings  Patient's infectious workup has come back negative at this time  Given patient had pulmonary infarct he will be on Eliquis 10 mg b i d  For 7 days and 5 mg b i d  Thereafter for at least 3 months  Patient will need to follow-up with pulmonologist upon discharge  Patient's aspirin was held given he is now on Eliquis and Plavix and he will need to discuss with his primary cardiologist if they would like to continue holding aspirin    Patient did have slight nose bleeds, but hemoglobins have been stable and advised patient if he does have severe nose bleed he should return to emergency department for further evaluation  Patient also noted to have incidental renal lesion noted and will need outpatient MRI which he is aware of and can follow with primary care provider  Patient was assessed by Physical therapy and Occupational therapy and recommended home health care  Patient was also walked by respiratory therapy and was not a candidate for oxygen  Patient is stable for discharge at this time and will return back home  Please see above list of diagnoses and related plan for additional information  Condition at Discharge: stable    Discharge Day Visit / Exam:   Subjective:  Patient resting comfortably without any complaints on exam   Patient is eager to go home today  Vitals: Blood Pressure: 150/71 (09/19/21 0731)  Pulse: 58 (09/19/21 0612)  Temperature: 98 3 °F (36 8 °C) (09/19/21 0731)  Temp Source: Oral (09/18/21 0654)  Respirations: 22 (09/18/21 1521)  Height: 5' 9" (175 3 cm) (09/13/21 1616)  Weight - Scale: (!) 145 kg (320 lb) (09/13/21 1614)  SpO2: 92 % (09/19/21 0612)  Exam:   Physical Exam  Vitals and nursing note reviewed  Constitutional:       General: He is not in acute distress  Appearance: He is well-developed  HENT:      Head: Normocephalic and atraumatic  Eyes:      General: No scleral icterus  Conjunctiva/sclera: Conjunctivae normal    Cardiovascular:      Rate and Rhythm: Normal rate and regular rhythm  Heart sounds: Normal heart sounds  No murmur heard  No friction rub  No gallop  Pulmonary:      Effort: Pulmonary effort is normal  No respiratory distress  Breath sounds: Normal breath sounds  No wheezing or rales  Abdominal:      General: Bowel sounds are normal  There is no distension  Palpations: Abdomen is soft  Tenderness: There is no abdominal tenderness  Musculoskeletal:         General: Normal range of motion     Skin:     General: Skin is warm       Findings: No rash  Neurological:      Mental Status: He is alert and oriented to person, place, and time  Discussion with Family: Updated  (wife) at bedside  Discharge instructions/Information to patient and family:   See after visit summary for information provided to patient and family  Provisions for Follow-Up Care:  See after visit summary for information related to follow-up care and any pertinent home health orders  Disposition:   Home with [facility] Services (Reminder: Complete face to face encounter)    Planned Readmission: no     Discharge Statement:  I spent 35 minutes discharging the patient  This time was spent on the day of discharge  I had direct contact with the patient on the day of discharge  Greater than 50% of the total time was spent examining patient, answering all patient questions, arranging and discussing plan of care with patient as well as directly providing post-discharge instructions  Additional time then spent on discharge activities  Discharge Medications:  See after visit summary for reconciled discharge medications provided to patient and/or family        **Please Note: This note may have been constructed using a voice recognition system**

## 2021-09-19 NOTE — ASSESSMENT & PLAN NOTE
· Multivessel CAD status post CABG in 2011  · Continue ranexa, imdur, plavix, crestor equivalent  · Aspirin discontinued given patient will start Eliquis; did tell patient to discuss with cardiologist outside hospital  · Follows with [facility] cardiology

## 2021-09-19 NOTE — PLAN OF CARE
Problem: OCCUPATIONAL THERAPY ADULT  Goal: Performs self-care activities at highest level of function for planned discharge setting  See evaluation for individualized goals  Description: Treatment Interventions: ADL retraining, Endurance training, Compensatory technique education, Energy conservation, Continued evaluation          See flowsheet documentation for full assessment, interventions and recommendations  Note: Limitation: Decreased UE ROM, Decreased ADL status, Decreased endurance, Decreased high-level ADLs  Prognosis: Good  Assessment: Patient is a 77 y o  male seen for OT evaluation s/p admit to [address] [facility] on 9/12/2021 w/Pulmonary infarct [facility])  Commorbidities affecting patient's functional performance at time of assessment include: Community acquired Pneumonia, Varicose veins of the lower extremity, HTN,  SIRS, CHF, CAD, presented to ED with SOB  Orders placed for OT evaluation and treatment  Performed at least two patient identifiers during session including name and wristband  Prior to admission, Patient reported independnet with ADLs/ IADLs, lives in a 2 story house with 3 LISSETTE; 16 steps to 2nd floor  patient ambulates with quad cane, drives and works as a   Personal factors affecting patient at time of initial evaluation include: steps to enter  Upon evaluation, patient requires modified independent assist for UB ADLs, supervision and set up assist for LB ADLs, transfers and functional ambulation in room and bathroom with modified independent assist, with the use of [facility]  Patient is alert and oriented x 4  Occupational performance is affected by the following deficits: degenerative arthritic joint changes, dynamic sit/ stand balance deficit with poor standing tolerance time for self care and functional mobility and decreased activity tolerance    Patient to benefit from continued Occupational Therapy treatment while in the hospital to address deficits as defined above and maximize level of functional independence with ADLs and functional mobility  Occupational Performance areas to address include: bathing/ shower, transfer to all surfaces, functional mobility and community mobility  From OT standpoint, recommendation at time of d/c would be Home with family support and [address]         OT Discharge Recommendation: Home with home health rehabilitation

## 2021-09-19 NOTE — PLAN OF CARE
Problem: PHYSICAL THERAPY ADULT  Goal: Performs mobility at highest level of function for planned discharge setting  See evaluation for individualized goals  Description: Treatment/Interventions: Functional transfer training, LE strengthening/ROM, Elevations, Therapeutic exercise, Endurance training, Patient/family training, Bed mobility, Gait training, Spoke to MD, OT          See flowsheet documentation for full assessment, interventions and recommendations  Note: Prognosis: Good  Problem List: Decreased strength, Decreased endurance, Impaired balance, Decreased mobility, Obesity, Pain  Assessment: Pt is 77year old male seen for PT evaluation s/p admit to [facility] on 9/12/2021 with Pulmonary infarct ([facility] [address] )  PT consulted to assess pt's functional mobility and d/c needs  Order placed for PT eval and tx  Comorbidities affecting pt's physical performance at time of assessment include CAD, SIRS, chronic diastolic CHF, varicose veins of right lower extremity, hypertension, and renal lesion  PTA, pt was independent with all functional mobility with occasional use of a quad cane  Personal factors affecting pt at time of IE include lives in a two story house, ambulating with an assistive device, stairs to enter home, inability to navigate community distances and inability to navigate level surfaces without external assistance  Please find objective findings from PT assessment regarding body systems outlined above with impairments and limitations including weakness, impaired balance, decreased endurance, gait deviations, decreased activity tolerance, and decreased functional mobility tolerance  The following objective measures performed on IE also reveal limitations: Barthel Index: 70/100 and Modified [place]: 3 (moderate disability)   Pt's clinical presentation is currently evolving seen in pt's presentation of need for ongoing medical management/monitoring and pt requires cues/assist for safety with functional mobility  Pt to benefit from continued PT tx to address deficits as defined above and maximize level of functional independent mobility and consistency  From PT/mobility standpoint, recommendation at time of d/c would be Home PT with family support pending progress in order to facilitate return to PLOF  Barriers to Discharge: None     PT Discharge Recommendation: Home with home health rehabilitation     See flowsheet documentation for full assessment

## 2021-09-19 NOTE — ASSESSMENT & PLAN NOTE
· Renal lesion noted on CT abdomen pelvis  · Patient will need follow-up with outpatient MRI; patient is aware

## 2021-09-19 NOTE — PHYSICAL THERAPY NOTE
Physical Therapy Evaluation     Patient's Name: Otto Okeefe    Admitting Diagnosis  Abdominal pain [R10 9]  Right upper lobe pneumonia [J18 9]  Sepsis ([facility] [address] ) [A41 9]    Problem List  Patient Active Problem List   Diagnosis    [facility]monary infarct ([facility] )    Coronary artery disease    SIRS (systemic inflammatory response syndrome) (HCC)    [facility]    Varicose veins of right lower extremity    Hypertension    Renal lesion     Past Medical History  No past medical history on file  Past Surgical History  No past surgical history on file      09/19/21 0904   PT Last Visit   PT Visit Date 09/19/21   Note Type   Note type Evaluation   Pain Assessment   Pain Assessment Tool 0-10   Pain Score No Pain  (pt reported b/l knee pain with mobility)   Home Living   Type of Home House   Home Layout Stairs to enter with rails; Two level;Bed/bath upstairs  (3 LISSETTE; 16 steps to 2nd floor)   Bathroom Shower/Tub Tub/shower unit   [employer] Raised   [employer] Grab bars in shower; Shower chair   Bathroom Accessibility Accessible   Home Equipment Quad cane   Additional Comments Pt ambulates with quad cane occasionally  Prior Function   Level of [place] Independent with ADLs and functional mobility   Lives With Spouse   Receives Help From Family   ADL Assistance Independent   IADLs Independent   Falls in the last 6 months 0   Vocational Full time employment   Restrictions/Precautions   Weight Bearing Precautions Per Order No   Braces or Orthoses Other (Comment)  (h/o right knee hinged brace PRN)   Other Precautions Multiple lines; Fall Risk;O2   General   Family/Caregiver Present No   Cognition   Overall Cognitive Status WFL   Arousal/Participation Alert   Attention Within functional limits   Orientation Level Oriented X4   Memory Within functional limits   Following Commands Follows all commands and directions without difficulty   Comments Pt agreeable to PT    CELE Assessment   RUE Assessment   (defer to OT assessment)   LUE Assessment   LUE Assessment   (defer to OT assessment)   RLE Assessment   RLE Assessment X  (knee varus)   Strength RLE   RLE Overall Strength 4-/5   LLE Assessment   LLE Assessment X  (knee varus)   Strength LLE   LLE Overall Strength 4-/5   Light Touch   RLE Light Touch Grossly intact   LLE Light Touch Grossly intact   Bed Mobility   Additional Comments Pt was received seated at EOB in NAD   Transfers   Sit to Stand 6  Modified independent   Additional items Increased time required;Verbal cues   Stand to Sit 6  Modified independent   Additional items Increased time required;Verbal cues   Ambulation/Elevation   Gait pattern Excessively slow; Step to;Short stride; Shuffling  (b/l knee valgus)   Gait Assistance 5  Supervision  (close supervision)   Additional items Assist x 1;Verbal cues   Assistive Device Small base quad cane   Distance 60 feet   Stair Management Assistance 4  Minimal assist  (CG assist)   Additional items Assist x 1;Verbal cues   Stair Management Technique One rail R;Step to pattern; Foreward; With cane   Number of Stairs 7  (3, 6 inch steps; 4, 4 inch steps)   Balance   Static Sitting Good   Dynamic Sitting Good   Static Standing Fair   Dynamic Standing Fair -   Endurance Deficit   Endurance Deficit Yes   Endurance Deficit Description CARRENO; decreased activity tolerance   Activity Tolerance   Activity Tolerance Patient limited by fatigue;Patient limited by pain   Medical Staff Made Aware OT Estrellita Virgen made aware of session outcomes  Co-evaluation performed with OT secondary to complex medical condition of patient  PT/OT goals were addressed separately  Nurse Made Aware Post session pt was left seated at EOB in NAD, all belongings within reach   Assessment   Prognosis Good   Problem List Decreased strength;Decreased endurance; Impaired balance;Decreased mobility;Obesity;Pain   Assessment Pt is 77year old male seen for PT evaluation s/p admit to [facility] on 9/12/2021 with Pulmonary infarct [facility])  PT consulted to assess pt's functional mobility and d/c needs  Order placed for PT eval and tx  Comorbidities affecting pt's physical performance at time of assessment include CAD, SIRS, chronic diastolic CHF, varicose veins of right lower extremity, hypertension, and renal lesion  PTA, pt was independent with all functional mobility with occasional use of a quad cane  Personal factors affecting pt at time of IE include lives in a two story house, ambulating with an assistive device, stairs to enter home, inability to navigate community distances and inability to navigate level surfaces without external assistance  Please find objective findings from PT assessment regarding body systems outlined above with impairments and limitations including weakness, impaired balance, decreased endurance, gait deviations, decreased activity tolerance, and decreased functional mobility tolerance  The following objective measures performed on IE also reveal limitations: Barthel Index: 70/100 and Modified [place]: 3 (moderate disability)  Pt's clinical presentation is currently evolving seen in pt's presentation of need for ongoing medical management/monitoring and pt requires cues/assist for safety with functional mobility  Pt to benefit from continued PT tx to address deficits as defined above and maximize level of functional independent mobility and consistency  From PT/mobility standpoint, recommendation at time of d/c would be Home PT with family support pending progress in order to facilitate return to PLOF     Barriers to Discharge None   Goals   STG Expiration Date 09/29/21   Short Term Goal #1 In 7-10 days: Increase bilateral LE strength 1/2 grade to facilitate independent mobility, Ambulate > 150 ft  with least restrictive assistive device modified independent w/o LOB and w/ normalized gait pattern 100% of the time, Navigate 16 stairs modified independent with unilateral handrail to facilitate return to previous living environment and Increase all balance 1/2 grade to decrease risk for falls   Plan   Treatment/Interventions Functional transfer training;LE strengthening/ROM; Elevations; Therapeutic exercise; Endurance training;Patient/family training;Bed mobility;Gait training;Spoke to MD;OT   PT Frequency Other (Comment)  (3-5x/wk)   Recommendation   PT Discharge Recommendation Home with home health rehabilitation   AM-PAC Basic Mobility Inpatient   Turning in Bed Without Bedrails 4   Lying on Back to Sitting on Edge of Flat Bed 4   Moving Bed to Chair 3   Standing Up From Chair 4   Walk in Room 3   Climb 3-5 Stairs 3   Basic Mobility Inpatient Raw Score 21   Basic Mobility Standardized Score 45 55   Modified [place] Scale   Modified [place] Scale 3   Barthel Index   Feeding 10   Bathing 0   Grooming Score 5   Dressing Score 5   Bladder Score 10   Bowels Score 10   Toilet Use Score 5   Transfers (Bed/Chair) Score 10   Mobility (Level Surface) Score 10   Stairs Score 5   Barthel Index Score 70     PT Evaluation Time: 0885-7255    Joyce James, PT, DPT

## 2021-09-19 NOTE — DISCHARGE INSTRUCTIONS
Please continue all prior home medications except for aspirin; discuss with cardiologist about continuing to hold aspirin given your on Eliquis  Continue Eliquis starter pack; start on day 2 pm dose and continue taking 10 mg twice a day until day 7; and continue with normal pack of 5 mg twice a day  Will need to follow-up with pulmonologist and will need repeat imaging of chest in 3 months  If noted to have profuse nosebleed would recommend coming to emergency department    Also noted to have renal lesion which should have outpatient MRI follow-up for better deliniation of lesion with primary care provider  Follow-up with primary care provider

## 2021-09-20 NOTE — UTILIZATION REVIEW
Notification of Discharge   This is a Notification of Discharge from our facility [address]  Please be advised that this patient has been discharge from our facility  Below you will find the admission and discharge date and time including the patients disposition  UTILIZATION REVIEW CONTACT:  Odilia Sal  Utilization   Network Utilization Review Department  Phone: [phone] x carefully listen to the prompts  All voicemails are confidential   Email: [email or web address]     PHYSICIAN ADVISORY SERVICES:  FOR [id number] REVIEW - MEDICAL NECESSITY DENIAL  Phone: [phone]  Fax: [phone]  Email: [email or web address]     PRESENTATION DATE: 9/12/2021  9:39 AM  OBERVATION ADMISSION DATE:   INPATIENT ADMISSION DATE: 9/12/21 12:28 PM   DISCHARGE DATE: 9/19/2021  1:09 PM  DISPOSITION: Home with [place] with [address] [place] [address] Road INFORMATION:  Send all requests for admission clinical reviews, approved or denied determinations and any other requests to dedicated fax number below belonging to the campus where the patient is receiving treatment   List of dedicated fax numbers:  [address] [place] [address] DENIALS (Administrative/Medical Necessity) [phone]   [address]  (Maternity/NICU/Pediatrics) [phone]   Bert Serrano [phone]   [facility] [phone]   [facility] [phone]   [facility] [address] [place] [address] [phone]   [facility]  [phone]   [address] [facility] [address] [facility]  [facility] [address] [facility] [phone]

## 2021-09-23 ENCOUNTER — TELEPHONE (OUTPATIENT)
Dept: PULMONOLOGY | Facility: CLINIC | Age: 66
End: 2021-09-23

## 2021-09-23 ENCOUNTER — OFFICE VISIT (OUTPATIENT)
Dept: PULMONOLOGY | Facility: CLINIC | Age: 66
End: 2021-09-23
Payer: COMMERCIAL

## 2021-09-23 VITALS
OXYGEN SATURATION: 96 % | TEMPERATURE: 97 F | WEIGHT: 308 LBS | DIASTOLIC BLOOD PRESSURE: 84 MMHG | BODY MASS INDEX: 45.62 KG/M2 | SYSTOLIC BLOOD PRESSURE: 124 MMHG | HEIGHT: 69 IN | HEART RATE: 60 BPM

## 2021-09-23 DIAGNOSIS — G47.33 OSA ON CPAP: ICD-10-CM

## 2021-09-23 DIAGNOSIS — E66.2 OBESITY HYPOVENTILATION SYNDROME (HCC): ICD-10-CM

## 2021-09-23 DIAGNOSIS — Z23 FLU VACCINE NEED: ICD-10-CM

## 2021-09-23 DIAGNOSIS — Z99.89 OSA ON CPAP: ICD-10-CM

## 2021-09-23 DIAGNOSIS — R06.02 SOB (SHORTNESS OF BREATH): Primary | ICD-10-CM

## 2021-09-23 DIAGNOSIS — R93.89 ABNORMAL CT OF THE CHEST: ICD-10-CM

## 2021-09-23 PROCEDURE — 90471 IMMUNIZATION ADMIN: CPT

## 2021-09-23 PROCEDURE — 99215 OFFICE O/P EST HI 40 MIN: CPT | Performed by: INTERNAL MEDICINE

## 2021-09-23 PROCEDURE — 1160F RVW MEDS BY RX/DR IN RCRD: CPT | Performed by: INTERNAL MEDICINE

## 2021-09-23 PROCEDURE — 1036F TOBACCO NON-USER: CPT | Performed by: INTERNAL MEDICINE

## 2021-09-23 PROCEDURE — 90662 IIV NO PRSV INCREASED AG IM: CPT

## 2021-09-23 NOTE — TELEPHONE ENCOUNTER
Called [facility] to get compliance  Due to machine being too old, pt needs to take in so they can download it  Called pt and asked if he can please go and he agreed to go to [facility], will call me once he has gone

## 2021-09-23 NOTE — LETTER
September 23, 2021     Patient: Otto Okeefe   YOB: 1955   Date of Visit: 9/23/2021       To Whom it May Concern:    Otto Okeefe is under my professional care  He was seen in my office on 9/23/2021  He may return to school on in 6 weeks when he will be in the office next in 6 weeks  If you have any questions or concerns, please don't hesitate to call           Sincerely,          Agustina Dailey MD        CC: No Recipients

## 2021-09-23 NOTE — PROGRESS NOTES
Assessment/Plan:   Diagnoses and all orders for this visit:    SOB (shortness of breath)  -     Echo complete with contrast if indicated; Future    Abnormal CT of the chest  -     CT chest without contrast; Future    Obesity hypoventilation syndrome (HCC)    GUME on CPAP    Flu vaccine need        Shortness of breath dyspnea on exertion likely multifactorial 2nd to morbid obesity, likely pulmonary hypertension in the setting of sleep apnea and recent pulmonary infarct although his CT of the chest does not demonstrate any definite pulmonary embolism  Currently on anticoagulation discussed complete for a total duration of 3 months given his high risk with morbid obesity     Any signs of bleeding he will notify right away  Will repeat CT of the chest in 6-8 weeks and follow-up   He would also need a lung function testing to be done wants to get it done at a later time given the COVID situation he states  Will order echocardiogram in follow-up to follow-up for the pulmonary hypertension  He has history of obstructive sleep apnea and has been on CPAP with [facility] he states for the past 4-5 years still has symptoms while using the CPAP he would benefit from a repeat titration study but will 1st try to get his download compliance from the machine and he states that once the COVID situation is better he wants to go in for the titration study then  Recommend weight loss  Follow-up in 6 weeks or p r n  earlier as needed  No follow-ups on file  All questions are answered to the patient's satisfaction and understanding  He verbalizes understanding  He is encouraged to call with any further questions or concerns  Portions of the record may have been created with voice recognition software  Occasional wrong word or "sound a like" substitutions may have occurred due to the inherent limitations of voice recognition software    Read the chart carefully and recognize, using context, where substitutions have occurred  Electronically Signed by Agustina Dailey MD    ______________________________________________________________________    Chief Complaint:   Chief Complaint   Patient presents with    Shortness of Breath    Wheezing    Cough       Patient ID: Gene is a 77 y o  y o  male has a past medical history of Cough, SOB (shortness of breath), and Wheezing     9/23/2021  Patient presents today for follow-up visit  Patient is a very pleasant 51-year-old gentleman nonsmoker, with history of peripheral vascular disease, with S of breath and dyspnea on exertion and pleuritic chest pain on and off for several months which became acute and more severe past week for which he was admitted in the hospital and CT of the chest with a wedge-shaped consolidation right upper lobe and ground-glass opacity surrounding, although there was no definite pulmonary embolism noted in the CT of the chest     Patient has been placed on anticoagulation and is here for follow-up he states his breathing is better since the hospital discharge  Review of Systems   Constitutional: Positive for fatigue  HENT: Negative  Eyes: Negative  Respiratory: Positive for shortness of breath  Cardiovascular: Negative  Gastrointestinal: Negative  Endocrine: Negative  Genitourinary: Negative  Musculoskeletal: Negative  Allergic/Immunologic: Negative  Neurological: Negative  Hematological: Negative  Psychiatric/Behavioral: Negative  Smoking history: He reports that he has never smoked   He has never used smokeless tobacco     The following portions of the patient's history were reviewed and updated as appropriate: allergies, current medications, past family history, past medical history, past social history, past surgical history and problem list     Immunization History   Administered Date(s) Administered    Influenza, seasonal, injectable 1955    SARS-CoV-2 / COVID-19 mRNA IM ([facility]) 04/16/2021, 05/14/2021     Current Outpatient Medications   Medication Sig Dispense Refill    apixaban (ELIQUIS) 5 mg Take 2 tablets (10 mg total) by mouth 2 (two) times a day for 7 days, THEN 1 tablet (5 mg total) 2 (two) times a day for 23 days  74 tablet 0    atorvastatin (LIPITOR) 40 mg tablet Take 1 tablet (40 mg total) by mouth daily with dinner 30 tablet 0    carvedilol (COREG) 25 mg tablet Take 25 mg by mouth      clopidogrel (PLAVIX) 75 mg tablet Take 75 mg by mouth daily      DULoxetine (CYMBALTA) 60 mg delayed release capsule Take 60 mg by mouth daily      FLUoxetine (PROzac) 40 MG capsule Take 1 capsule by mouth daily      furosemide (LASIX) 40 mg tablet Take 1 tablet by mouth daily      gabapentin (NEURONTIN) 400 mg capsule Titrate up at night as needed for pain start with one tablet at night increase to two or three as needed each night, may wean if not needed      loperamide (IMODIUM) 2 mg capsule Take 1 capsule (2 mg total) by mouth 3 (three) times a day as needed for diarrhea 30 capsule 0    loratadine (CLARITIN) 10 mg tablet Take 1 tablet (10 mg total) by mouth daily 30 tablet 0    losartan (COZAAR) 100 MG tablet Take 1 tablet (100 mg total) by mouth daily  0    ranolazine (RANEXA) 1000 MG SR tablet Take 1 tablet (1,000 mg total) by mouth every 12 (twelve) hours  0    sodium chloride (OCEAN) 0 65 % nasal spray 1 spray into each nostril as needed for congestion 37 5 mL 0    benzonatate (TESSALON PERLES) 100 mg capsule Take 1 capsule (100 mg total) by mouth 3 (three) times a day as needed for cough (Patient not taking: Reported on 9/23/2021) 20 capsule 0     No current facility-administered medications for this visit  Allergies: Medical tape, Sulfa antibiotics, Fosinopril, and Lisinopril    Objective:  Vitals:    09/23/21 0837   BP: 124/84   Pulse: 60   Temp: (!) 97 °F (36 1 °C)   SpO2: 96%   Weight: (!) 140 kg (308 lb)   Height: 5' 9" (1 753 m)   Oxygen Therapy  SpO2: 96 %      Wt Readings from Last 3 Encounters:   09/23/21 (!) 140 kg (308 lb)   09/13/21 (!) 145 kg (320 lb)   02/24/14 (!) 147 kg (324 lb 4 oz)     Body mass index is 45 48 kg/m²  Physical Exam  Constitutional:       Appearance: He is well-developed  HENT:      Head: Normocephalic and atraumatic  Eyes:      Pupils: Pupils are equal, round, and reactive to light  Neck:      Comments: Short and wide neck  Cardiovascular:      Rate and Rhythm: Normal rate and regular rhythm  Heart sounds: Normal heart sounds  Pulmonary:      Effort: Pulmonary effort is normal       Breath sounds: Normal breath sounds  Musculoskeletal:         General: Normal range of motion  Cervical back: Normal range of motion and neck supple  Skin:     General: Skin is warm and dry  Neurological:      Mental Status: He is alert and oriented to person, place, and time  Psychiatric:         Behavior: Behavior normal            Diagnostics:  I have personally reviewed pertinent films in PACS  PE Study with CT Abdomen and Pelvis with contrast    Result Date: 9/12/2021  Narrative: CT PULMONARY ANGIOGRAM OF THE CHEST AND CT ABDOMEN AND PELVIS WITH INTRAVENOUS CONTRAST INDICATION:   Right lower lung, RUQ pain, fever  COMPARISON:  CT chest May 31, 2013  No prior CT abdomen and pelvis available for comparison  TECHNIQUE:  CT examination of the chest, abdomen and pelvis was performed  Thin section CT angiographic technique was used in the chest in order to evaluate for pulmonary embolus and coronal 3D MIP postprocessing was performed on the acquisition scanner  Axial, sagittal, and coronal 2D reformatted images were created from the source data and submitted for interpretation  Radiation dose length product (DLP) for this visit:  2092 mGy-cm     This examination, like all CT scans performed in the [facility], was performed utilizing techniques to minimize radiation dose exposure, including the use of iterative reconstruction and automated exposure control  IV Contrast:  100 mL of iohexol (OMNIPAQUE) Enteric Contrast:  Enteric contrast was not administered  FINDINGS: CHEST PULMONARY ARTERIAL TREE: Poor contrast bolus timing precludes assessment of the segmental branches of the pulmonary artery  There is right suprahilar soft tissue measuring approximately 1 8 x 1 7 cm adjacent to the distal main right pulmonary artery that appears separate from the artery and is likely a prominent lymph node and not a thrombus (series 2, image 91; series 602, image 88)  No definite filling defect identified  LUNGS:  Wedge-shaped consolidation in the periphery of the right upper lobe measuring up to 10 9 cm with surrounding groundglass (series 3, image 49)  PLEURA:  Small right pleural effusion  HEART/AORTA:  Post CABG  Coronary artery calcifications and atherosclerotic calcifications of the aorta  No pericardial effusion  Aberrant right subclavian artery courses posterior to the esophagus, an anatomic variant  No findings of right heart strain  MEDIASTINUM AND MARQUIS:  Unremarkable  CHEST WALL AND LOWER NECK:   Unremarkable  ABDOMEN LIVER/BILIARY TREE:  Unremarkable  GALLBLADDER:  The gallbladder is distended to 6 cm  No appreciable wall thickening  No pericholecystic free fluid  No radiopaque gallstones  SPLEEN:  Unremarkable  PANCREAS:  Unremarkable  ADRENAL GLANDS:  Unremarkable  KIDNEYS/URETERS:  1 4 cm hypoattenuating lesion in the anterior right kidney with Hounsfield units greater than simple fluid (series 2, image 303)  No hydroureteronephrosis  Nonspecific bilateral perirenal fat stranding, possibly due to senescence  STOMACH AND BOWEL:  There is colonic diverticulosis without evidence of acute diverticulitis  APPENDIX:  No findings to suggest appendicitis  ABDOMINOPELVIC CAVITY:  No ascites  No pneumoperitoneum  No lymphadenopathy  VESSELS:  Atherosclerotic changes are present  No evidence of aneurysm   PELVIS REPRODUCTIVE ORGANS:  Unremarkable for patient's age  URINARY BLADDER:  Unremarkable  ABDOMINAL WALL/INGUINAL REGIONS:  Unremarkable  OSSEOUS STRUCTURES:  No acute fracture or destructive osseous lesion  Spinal degenerative changes are noted  Severe right glenohumeral osteoarthrosis  Impression: 1  Peripheral consolidation in the right upper lobe with surrounding groundglass  The appearance is typically seen with pulmonary infarction; however, there is no definite pulmonary embolism, noting that the distal branches of the pulmonary artery are not well-visualized, and the soft tissue in the right hilar region is favored to be a prominent lymph node and not an intraluminal thrombus  Moreover, given the size of the consolidation, a larger, more conspicuous thrombus would have been expected  Therefore (and given patient's fever) this is favored to represent pneumonia, and a follow-up a chest CT is recommended in 3 months to document resolution and to ensure the absence of an underlying lesion  2   Small right pleural effusion  3   Indeterminant right renal lesion  Nonemergent contrast-enhanced MRI of the abdomen is advised for further evaluation  4   Gallbladder luminal distention without cholelithiasis or findings of acute cholecystitis  Findings are likely reactive  I personally discussed this study with Mia Torrez on 9/12/2021 at 12:08 PM and 12: 62 PM  Workstation performed: [id number]     VAS lower limb venous duplex study, unilateral/limited    Result Date: 9/13/2021  Narrative:  [facility]: Indications: Swelling of Limb [R22 4]  Swelling of left leg  Patient states history of DVT last year, but no records  Operative History: CORONORY STENTING X 2 UVULECTOMY  FINDINGS:  Left     Impression       CFV      Normal (Patent)     CONCLUSION: Impression: RIGHT LOWER LIMB LIMITED: Evaluation shows no evidence of thrombus in the common femoral vein   Doppler evaluation shows a normal response to augmentation maneuvers  LEFT LOWER LIMB: No evidence of acute or chronic deep vein thrombosis No evidence of superficial thrombophlebitis noted  Doppler evaluation shows a normal response to augmentation maneuvers  Popliteal, posterior tibial and anterior tibial arterial Doppler waveforms are triphasic  Technical findings were given to covering floor nurse at time of scan    SIGNATURE: Electronically Signed by: Oriana Chatterjee MD on 2021-09-13 10:34:01 AM

## 2021-09-24 ENCOUNTER — APPOINTMENT (OUTPATIENT)
Dept: LAB | Facility: MEDICAL CENTER | Age: 66
End: 2021-09-24
Payer: COMMERCIAL

## 2021-09-24 DIAGNOSIS — I26.99 PULMONARY INFARCT (HCC): ICD-10-CM

## 2021-09-24 LAB
ERYTHROCYTE [DISTWIDTH] IN BLOOD BY AUTOMATED COUNT: 14.5 % (ref 11.6–15.1)
HCT VFR BLD AUTO: 38.1 % (ref 36.5–49.3)
HGB BLD-MCNC: 12.3 G/DL (ref 12–17)
MCH RBC QN AUTO: 30.6 PG (ref 26.8–34.3)
MCHC RBC AUTO-ENTMCNC: 32.3 G/DL (ref 31.4–37.4)
MCV RBC AUTO: 95 FL (ref 82–98)
PLATELET # BLD AUTO: 445 THOUSANDS/UL (ref 149–390)
PMV BLD AUTO: 9.6 FL (ref 8.9–12.7)
RBC # BLD AUTO: 4.02 MILLION/UL (ref 3.88–5.62)
WBC # BLD AUTO: 9.46 THOUSAND/UL (ref 4.31–10.16)

## 2021-09-24 PROCEDURE — 85027 COMPLETE CBC AUTOMATED: CPT

## 2021-09-24 PROCEDURE — 36415 COLL VENOUS BLD VENIPUNCTURE: CPT

## 2021-10-19 ENCOUNTER — TELEPHONE (OUTPATIENT)
Dept: PULMONOLOGY | Facility: CLINIC | Age: 66
End: 2021-10-19

## 2021-10-30 ENCOUNTER — HOSPITAL ENCOUNTER (OUTPATIENT)
Dept: CT IMAGING | Facility: HOSPITAL | Age: 66
Discharge: HOME | End: 2021-10-30
Attending: INTERNAL MEDICINE
Payer: COMMERCIAL

## 2021-10-30 DIAGNOSIS — R93.89 ABNORMAL CT OF THE CHEST: ICD-10-CM

## 2021-10-30 PROCEDURE — 71250 CT THORAX DX C-: CPT

## 2021-11-02 ENCOUNTER — OFFICE VISIT (OUTPATIENT)
Dept: INTERNAL MEDICINE CLINIC | Facility: CLINIC | Age: 66
End: 2021-11-02
Payer: COMMERCIAL

## 2021-11-02 VITALS
HEIGHT: 69 IN | WEIGHT: 302.8 LBS | RESPIRATION RATE: 16 BRPM | OXYGEN SATURATION: 93 % | TEMPERATURE: 97.1 F | HEART RATE: 65 BPM | SYSTOLIC BLOOD PRESSURE: 140 MMHG | DIASTOLIC BLOOD PRESSURE: 88 MMHG | BODY MASS INDEX: 44.85 KG/M2

## 2021-11-02 DIAGNOSIS — G62.9 NEUROPATHY: ICD-10-CM

## 2021-11-02 DIAGNOSIS — I26.99 PULMONARY INFARCT (HCC): ICD-10-CM

## 2021-11-02 DIAGNOSIS — G47.33 OBSTRUCTIVE SLEEP APNEA: ICD-10-CM

## 2021-11-02 DIAGNOSIS — E78.00 PURE HYPERCHOLESTEROLEMIA: ICD-10-CM

## 2021-11-02 DIAGNOSIS — E66.01 MORBID OBESITY (HCC): ICD-10-CM

## 2021-11-02 DIAGNOSIS — Z11.59 NEED FOR HEPATITIS C SCREENING TEST: ICD-10-CM

## 2021-11-02 DIAGNOSIS — R60.0 LOCALIZED EDEMA: ICD-10-CM

## 2021-11-02 DIAGNOSIS — I25.118 CORONARY ARTERY DISEASE OF NATIVE ARTERY OF NATIVE HEART WITH STABLE ANGINA PECTORIS (HCC): ICD-10-CM

## 2021-11-02 DIAGNOSIS — Z12.11 SCREENING FOR COLORECTAL CANCER: ICD-10-CM

## 2021-11-02 DIAGNOSIS — I10 BENIGN ESSENTIAL HYPERTENSION: Primary | ICD-10-CM

## 2021-11-02 DIAGNOSIS — F32.A DEPRESSIVE DISORDER: ICD-10-CM

## 2021-11-02 DIAGNOSIS — R73.01 IMPAIRED FASTING GLUCOSE: ICD-10-CM

## 2021-11-02 DIAGNOSIS — I50.32 CHRONIC DIASTOLIC CONGESTIVE HEART FAILURE (HCC): ICD-10-CM

## 2021-11-02 DIAGNOSIS — N28.9 RENAL LESION: ICD-10-CM

## 2021-11-02 DIAGNOSIS — Z12.12 SCREENING FOR COLORECTAL CANCER: ICD-10-CM

## 2021-11-02 PROBLEM — M25.512 CHRONIC PAIN OF BOTH SHOULDERS: Status: ACTIVE | Noted: 2018-04-25

## 2021-11-02 PROBLEM — J30.1 SEASONAL ALLERGIC RHINITIS DUE TO POLLEN: Status: ACTIVE | Noted: 2019-03-01

## 2021-11-02 PROBLEM — M25.511 CHRONIC PAIN OF BOTH SHOULDERS: Status: ACTIVE | Noted: 2018-04-25

## 2021-11-02 PROBLEM — G89.29 CHRONIC PAIN OF BOTH SHOULDERS: Status: ACTIVE | Noted: 2018-04-25

## 2021-11-02 PROBLEM — Z95.1 HX OF CABG: Status: ACTIVE | Noted: 2017-10-31

## 2021-11-02 PROBLEM — I35.0 NONRHEUMATIC AORTIC VALVE STENOSIS: Status: ACTIVE | Noted: 2017-10-31

## 2021-11-02 PROBLEM — M17.12 PRIMARY OSTEOARTHRITIS OF LEFT KNEE: Status: ACTIVE | Noted: 2017-11-07

## 2021-11-02 PROBLEM — R65.10 SIRS (SYSTEMIC INFLAMMATORY RESPONSE SYNDROME) (HCC): Status: RESOLVED | Noted: 2021-09-12 | Resolved: 2021-11-02

## 2021-11-02 PROCEDURE — 3725F SCREEN DEPRESSION PERFORMED: CPT | Performed by: INTERNAL MEDICINE

## 2021-11-02 PROCEDURE — 1101F PT FALLS ASSESS-DOCD LE1/YR: CPT | Performed by: INTERNAL MEDICINE

## 2021-11-02 PROCEDURE — 3077F SYST BP >= 140 MM HG: CPT | Performed by: INTERNAL MEDICINE

## 2021-11-02 PROCEDURE — 99204 OFFICE O/P NEW MOD 45 MIN: CPT | Performed by: INTERNAL MEDICINE

## 2021-11-02 PROCEDURE — 3288F FALL RISK ASSESSMENT DOCD: CPT | Performed by: INTERNAL MEDICINE

## 2021-11-02 PROCEDURE — 3079F DIAST BP 80-89 MM HG: CPT | Performed by: INTERNAL MEDICINE

## 2021-11-02 RX ORDER — DULOXETIN HYDROCHLORIDE 60 MG/1
60 CAPSULE, DELAYED RELEASE ORAL DAILY
Qty: 90 CAPSULE | Refills: 3 | Status: SHIPPED | OUTPATIENT
Start: 2021-11-02

## 2021-11-02 RX ORDER — LOSARTAN POTASSIUM 100 MG/1
100 TABLET ORAL DAILY
Qty: 90 TABLET | Refills: 3 | Status: SHIPPED | OUTPATIENT
Start: 2021-11-02

## 2021-11-02 RX ORDER — ATORVASTATIN CALCIUM 40 MG/1
40 TABLET, FILM COATED ORAL
Qty: 90 TABLET | Refills: 3 | Status: SHIPPED | OUTPATIENT
Start: 2021-11-02 | End: 2021-12-02

## 2021-11-02 RX ORDER — FUROSEMIDE 40 MG/1
40 TABLET ORAL DAILY
Qty: 90 TABLET | Refills: 1 | Status: SHIPPED | OUTPATIENT
Start: 2021-11-02 | End: 2022-08-10

## 2021-11-02 RX ORDER — CLOPIDOGREL BISULFATE 75 MG/1
75 TABLET ORAL DAILY
Qty: 90 TABLET | Refills: 3 | Status: SHIPPED | OUTPATIENT
Start: 2021-11-02 | End: 2022-11-02

## 2021-11-02 RX ORDER — CARVEDILOL 25 MG/1
25 TABLET ORAL 2 TIMES DAILY WITH MEALS
Qty: 180 TABLET | Refills: 3 | Status: SHIPPED | OUTPATIENT
Start: 2021-11-02 | End: 2022-11-02

## 2021-11-02 RX ORDER — GABAPENTIN 400 MG/1
400 CAPSULE ORAL 2 TIMES DAILY
Qty: 180 CAPSULE | Refills: 3 | Status: SHIPPED | OUTPATIENT
Start: 2021-11-02

## 2021-11-02 RX ORDER — RANOLAZINE 1000 MG/1
1000 TABLET, EXTENDED RELEASE ORAL EVERY 12 HOURS SCHEDULED
Qty: 180 TABLET | Refills: 2 | Status: SHIPPED | OUTPATIENT
Start: 2021-11-02

## 2021-11-08 ENCOUNTER — OFFICE VISIT (OUTPATIENT)
Dept: PULMONOLOGY | Facility: CLINIC | Age: 66
End: 2021-11-08
Payer: COMMERCIAL

## 2021-11-08 VITALS
TEMPERATURE: 97.2 F | HEIGHT: 69 IN | HEART RATE: 63 BPM | BODY MASS INDEX: 46.06 KG/M2 | OXYGEN SATURATION: 92 % | DIASTOLIC BLOOD PRESSURE: 84 MMHG | WEIGHT: 311 LBS | SYSTOLIC BLOOD PRESSURE: 164 MMHG

## 2021-11-08 DIAGNOSIS — E66.01 MORBID OBESITY (HCC): ICD-10-CM

## 2021-11-08 DIAGNOSIS — G47.33 OSA ON CPAP: ICD-10-CM

## 2021-11-08 DIAGNOSIS — R06.02 SOB (SHORTNESS OF BREATH): Primary | ICD-10-CM

## 2021-11-08 DIAGNOSIS — R93.89 ABNORMAL CT OF THE CHEST: ICD-10-CM

## 2021-11-08 DIAGNOSIS — Z99.89 OSA ON CPAP: ICD-10-CM

## 2021-11-08 DIAGNOSIS — E66.2 OBESITY HYPOVENTILATION SYNDROME (HCC): ICD-10-CM

## 2021-11-08 PROCEDURE — 99214 OFFICE O/P EST MOD 30 MIN: CPT | Performed by: INTERNAL MEDICINE

## 2021-11-18 ENCOUNTER — OFFICE VISIT (OUTPATIENT)
Dept: CARDIOLOGY CLINIC | Facility: CLINIC | Age: 66
End: 2021-11-18
Payer: COMMERCIAL

## 2021-11-18 VITALS
BODY MASS INDEX: 45.47 KG/M2 | SYSTOLIC BLOOD PRESSURE: 120 MMHG | RESPIRATION RATE: 18 BRPM | WEIGHT: 307 LBS | HEART RATE: 51 BPM | HEIGHT: 69 IN | DIASTOLIC BLOOD PRESSURE: 72 MMHG | OXYGEN SATURATION: 94 %

## 2021-11-18 DIAGNOSIS — I10 BENIGN ESSENTIAL HYPERTENSION: ICD-10-CM

## 2021-11-18 DIAGNOSIS — R00.1 BRADYCARDIA: ICD-10-CM

## 2021-11-18 DIAGNOSIS — Z95.1 HX OF CABG: ICD-10-CM

## 2021-11-18 DIAGNOSIS — E78.00 PURE HYPERCHOLESTEROLEMIA: ICD-10-CM

## 2021-11-18 DIAGNOSIS — I35.0 NONRHEUMATIC AORTIC VALVE STENOSIS: ICD-10-CM

## 2021-11-18 DIAGNOSIS — I50.32 CHRONIC DIASTOLIC CONGESTIVE HEART FAILURE (HCC): ICD-10-CM

## 2021-11-18 DIAGNOSIS — I34.0 NONRHEUMATIC MITRAL VALVE REGURGITATION: ICD-10-CM

## 2021-11-18 DIAGNOSIS — I25.118 CORONARY ARTERY DISEASE OF NATIVE ARTERY OF NATIVE HEART WITH STABLE ANGINA PECTORIS (HCC): Primary | ICD-10-CM

## 2021-11-18 PROCEDURE — 1160F RVW MEDS BY RX/DR IN RCRD: CPT | Performed by: INTERNAL MEDICINE

## 2021-11-18 PROCEDURE — 1036F TOBACCO NON-USER: CPT | Performed by: INTERNAL MEDICINE

## 2021-11-18 PROCEDURE — 99204 OFFICE O/P NEW MOD 45 MIN: CPT | Performed by: INTERNAL MEDICINE

## 2021-11-18 PROCEDURE — 3008F BODY MASS INDEX DOCD: CPT | Performed by: INTERNAL MEDICINE

## 2022-04-08 ENCOUNTER — HOSPITAL ENCOUNTER (OUTPATIENT)
Dept: NON INVASIVE DIAGNOSTICS | Facility: CLINIC | Age: 67
Discharge: HOME | End: 2022-04-08
Payer: COMMERCIAL

## 2022-04-08 VITALS
HEART RATE: 51 BPM | BODY MASS INDEX: 45.77 KG/M2 | HEIGHT: 69 IN | WEIGHT: 309 LBS | DIASTOLIC BLOOD PRESSURE: 72 MMHG | SYSTOLIC BLOOD PRESSURE: 120 MMHG

## 2022-04-08 DIAGNOSIS — R06.02 SOB (SHORTNESS OF BREATH): ICD-10-CM

## 2022-04-08 LAB
AORTIC ROOT: 3.9 CM
AORTIC VALVE MEAN VELOCITY: 26.6 M/S
APICAL FOUR CHAMBER EJECTION FRACTION: 69 %
AV AREA BY CONTINUOUS VTI: 1.9 CM2
AV AREA PEAK VELOCITY: 1.9 CM2
AV LVOT MEAN GRADIENT: 7 MMHG
AV LVOT PEAK GRADIENT: 9 MMHG
AV MEAN GRADIENT: 30 MMHG
AV PEAK GRADIENT: 46 MMHG
AV VALVE AREA: 1.89 CM2
AV VELOCITY RATIO: 0.45
DOP CALC AO PEAK VEL: 3.4 M/S
DOP CALC AO VTI: 84.77 CM
DOP CALC LVOT AREA: 4.15 CM2
DOP CALC LVOT DIAMETER: 2.3 CM
DOP CALC LVOT PEAK VEL VTI: 38.62 CM
DOP CALC LVOT PEAK VEL: 1.53 M/S
DOP CALC LVOT STROKE INDEX: 63.7 ML/M2
DOP CALC LVOT STROKE VOLUME: 160.38 CM3
E WAVE DECELERATION TIME: 282 MS
FRACTIONAL SHORTENING: 38 % (ref 28–44)
INTERVENTRICULAR SEPTUM IN DIASTOLE (PARASTERNAL SHORT AXIS VIEW): 1.4 CM
INTERVENTRICULAR SEPTUM: 1.4 CM (ref 0.6–1.12)
LAAS-AP2: 19.7 CM2
LAAS-AP4: 18.4 CM2
LEFT ATRIUM AREA SYSTOLE SINGLE PLANE A4C: 17.6 CM2
LEFT ATRIUM SIZE: 4.6 CM
LEFT INTERNAL DIMENSION IN SYSTOLE: 3.6 CM (ref 11.27–17.1)
LEFT VENTRICULAR INTERNAL DIMENSION IN DIASTOLE: 5.8 CM (ref 19.55–29.16)
LEFT VENTRICULAR POSTERIOR WALL IN END DIASTOLE: 1.4 CM (ref 0.58–1.11)
LEFT VENTRICULAR STROKE VOLUME: 112 ML
LVSV (TEICH): 112 ML
MV E'TISSUE VEL-LAT: 7 CM/S
MV PEAK A VEL: 0.6 M/S
MV PEAK E VEL: 100 CM/S
MV STENOSIS PRESSURE HALF TIME: 82 MS
MV VALVE AREA P 1/2 METHOD: 2.68 CM2
RIGHT ATRIUM AREA SYSTOLE A4C: 15.2 CM2
RIGHT VENTRICLE ID DIMENSION: 3.9 CM
SL CV LEFT ATRIUM LENGTH A2C: 5.5 CM
SL CV LV EF: 55
SL CV PED ECHO LEFT VENTRICLE DIASTOLIC VOLUME (MOD BIPLANE) 2D: 168 ML
SL CV PED ECHO LEFT VENTRICLE SYSTOLIC VOLUME (MOD BIPLANE) 2D: 55 ML
Z-SCORE OF INTERVENTRICULAR SEPTUM IN END DIASTOLE: 4.04
Z-SCORE OF LEFT VENTRICULAR DIMENSION IN END DIASTOLE: -14.15
Z-SCORE OF LEFT VENTRICULAR DIMENSION IN END SYSTOLE: -10.67
Z-SCORE OF LEFT VENTRICULAR POSTERIOR WALL IN END DIASTOLE: 4.15

## 2022-04-08 PROCEDURE — 93306 TTE W/DOPPLER COMPLETE: CPT

## 2022-04-08 PROCEDURE — 93306 TTE W/DOPPLER COMPLETE: CPT | Performed by: INTERNAL MEDICINE

## 2022-08-10 DIAGNOSIS — I50.32 CHRONIC DIASTOLIC CONGESTIVE HEART FAILURE (HCC): ICD-10-CM

## 2022-08-10 RX ORDER — FUROSEMIDE 40 MG/1
TABLET ORAL
Qty: 90 TABLET | Refills: 1 | Status: SHIPPED | OUTPATIENT
Start: 2022-08-10

## 2022-08-22 ENCOUNTER — OFFICE VISIT (OUTPATIENT)
Dept: OBGYN CLINIC | Facility: CLINIC | Age: 67
End: 2022-08-22
Payer: COMMERCIAL

## 2022-08-22 VITALS
BODY MASS INDEX: 46.65 KG/M2 | SYSTOLIC BLOOD PRESSURE: 136 MMHG | HEART RATE: 47 BPM | DIASTOLIC BLOOD PRESSURE: 80 MMHG | HEIGHT: 69 IN | WEIGHT: 315 LBS

## 2022-08-22 DIAGNOSIS — M17.0 PRIMARY OSTEOARTHRITIS OF BOTH KNEES: Primary | ICD-10-CM

## 2022-08-22 PROCEDURE — 3075F SYST BP GE 130 - 139MM HG: CPT | Performed by: FAMILY MEDICINE

## 2022-08-22 PROCEDURE — 99203 OFFICE O/P NEW LOW 30 MIN: CPT | Performed by: FAMILY MEDICINE

## 2022-08-22 PROCEDURE — 20610 DRAIN/INJ JOINT/BURSA W/O US: CPT | Performed by: FAMILY MEDICINE

## 2022-08-22 PROCEDURE — 3079F DIAST BP 80-89 MM HG: CPT | Performed by: FAMILY MEDICINE

## 2022-08-22 PROCEDURE — 1160F RVW MEDS BY RX/DR IN RCRD: CPT | Performed by: FAMILY MEDICINE

## 2022-08-22 RX ORDER — LIDOCAINE HYDROCHLORIDE 10 MG/ML
2 INJECTION, SOLUTION INFILTRATION; PERINEURAL
Status: COMPLETED | OUTPATIENT
Start: 2022-08-22 | End: 2022-08-22

## 2022-08-22 RX ORDER — LIDOCAINE HYDROCHLORIDE 10 MG/ML
3 INJECTION, SOLUTION INFILTRATION; PERINEURAL
Status: COMPLETED | OUTPATIENT
Start: 2022-08-22 | End: 2022-08-22

## 2022-08-22 RX ORDER — BUPIVACAINE HYDROCHLORIDE 2.5 MG/ML
2 INJECTION, SOLUTION INFILTRATION; PERINEURAL
Status: COMPLETED | OUTPATIENT
Start: 2022-08-22 | End: 2022-08-22

## 2022-08-22 RX ORDER — TRIAMCINOLONE ACETONIDE 40 MG/ML
40 INJECTION, SUSPENSION INTRA-ARTICULAR; INTRAMUSCULAR
Status: COMPLETED | OUTPATIENT
Start: 2022-08-22 | End: 2022-08-22

## 2022-08-22 RX ADMIN — LIDOCAINE HYDROCHLORIDE 3 ML: 10 INJECTION, SOLUTION INFILTRATION; PERINEURAL at 14:44

## 2022-08-22 RX ADMIN — LIDOCAINE HYDROCHLORIDE 2 ML: 10 INJECTION, SOLUTION INFILTRATION; PERINEURAL at 14:44

## 2022-08-22 RX ADMIN — TRIAMCINOLONE ACETONIDE 40 MG: 40 INJECTION, SUSPENSION INTRA-ARTICULAR; INTRAMUSCULAR at 14:44

## 2022-08-22 RX ADMIN — BUPIVACAINE HYDROCHLORIDE 2 ML: 2.5 INJECTION, SOLUTION INFILTRATION; PERINEURAL at 14:44

## 2022-08-22 NOTE — PROGRESS NOTES
Assessment/Plan:  Assessment/Plan   Diagnoses and all orders for this visit:    Primary osteoarthritis of both knees  -     Large joint arthrocentesis: bilateral knee  -     Injection Procedure Prior Authorization; Future        59-year-old male with osteoarthritis of both knees with bilateral knee pain many years duration  Discussed with patient physical exam, impression and plan  Physical exam noted for genu varum both knees  He has swelling both knees  He has extension limited to -5° both knees  Clinical impression is that he is symptomatic from degenerative changes  Since he had multiple steroid injections I offered patient viscosupplementation  We will request for one-shot Visco injection to both knees  In the interim I offered steroid injection for more immediate relief  I administered mixtures of 2 cc 1% lidocaine, 2 cc 0 25% bupivacaine, and 1 cc Kenalog to each knee without complication  He will return for Visco injections once approved  Subjective:   Patient ID: Otto Willis is a 79 y o  male  Chief Complaint   Patient presents with    Right Knee - Pain    Left Knee - Pain       59-year-old male presents for evaluation of bilateral knee pain many years duration  He reports have been diagnosed with osteoarthritis and has been treating with Dr Yun Cabrera with injections  He last received injection nearly 1 year ago  He states injection provided improvement pain that lasted about 1 month  He has been dealing with pain described as generalized to the knees, radiating distally to lower legs, worse with bearing weight and ambulating, associated with swelling, and improved resting  He started ambulating with a cane 3 weeks ago  Has been applying topical diclofenac with little improvement  Knee Pain  This is a chronic problem  The current episode started more than 1 year ago  The problem occurs daily  The problem has been gradually worsening   Associated symptoms include arthralgias and joint swelling  Pertinent negatives include no abdominal pain, chest pain, chills, fever, numbness, rash, sore throat or weakness  The symptoms are aggravated by standing and walking  He has tried rest and acetaminophen (Topical diclofenac) for the symptoms  The treatment provided mild relief  The following portions of the patient's history were reviewed and updated as appropriate: He  has a past medical history of Cough, Sleep apnea, obstructive, SOB (shortness of breath), and Wheezing  He  has no past surgical history on file  His family history includes Heart disease in his father  He  reports that he has never smoked  He has never used smokeless tobacco  He reports that he does not drink alcohol and does not use drugs  He is allergic to medical tape, sulfa antibiotics, fosinopril, and lisinopril       Review of Systems   Constitutional: Negative for chills and fever  HENT: Negative for sore throat  Eyes: Negative for visual disturbance  Respiratory: Negative for shortness of breath  Cardiovascular: Negative for chest pain  Gastrointestinal: Negative for abdominal pain  Genitourinary: Negative for flank pain  Musculoskeletal: Positive for arthralgias and joint swelling  Skin: Negative for rash and wound  Neurological: Negative for weakness and numbness  Hematological: Bruises/bleeds easily  Psychiatric/Behavioral: Negative for self-injury  Objective:  Vitals:    08/22/22 1407   BP: 136/80   Pulse: (!) 47   Weight: (!) 144 kg (318 lb)   Height: 5' 9" (1 753 m)     Right Knee Exam     Range of Motion   Extension: -5     Other   Swelling: mild    Comments:  Genu varum      Left Knee Exam     Range of Motion   Extension: -5     Other   Swelling: mild    Comments:  Genu varum            Physical Exam  Vitals and nursing note reviewed  Constitutional:       General: He is not in acute distress  Appearance: He is well-developed  He is not ill-appearing or diaphoretic     HENT: Head: Normocephalic and atraumatic  Right Ear: External ear normal       Left Ear: External ear normal    Eyes:      Conjunctiva/sclera: Conjunctivae normal    Neck:      Trachea: No tracheal deviation  Cardiovascular:      Comments: Bradycardic  Pulmonary:      Effort: Pulmonary effort is normal  No respiratory distress  Abdominal:      General: There is no distension  Musculoskeletal:         General: Swelling present  Skin:     General: Skin is warm and dry  Coloration: Skin is not jaundiced or pale  Neurological:      Mental Status: He is alert and oriented to person, place, and time  Psychiatric:         Mood and Affect: Mood normal          Behavior: Behavior normal          Thought Content: Thought content normal          Judgment: Judgment normal                  Large joint arthrocentesis: bilateral knee  Universal Protocol:  Consent: Verbal consent obtained  Risks and benefits: risks, benefits and alternatives were discussed  Consent given by: patient  Time out: Immediately prior to procedure a "time out" was called to verify the correct patient, procedure, equipment, support staff and site/side marked as required  Patient understanding: patient states understanding of the procedure being performed  Patient consent: the patient's understanding of the procedure matches consent given  Procedure consent: procedure consent matches procedure scheduled  Relevant documents: relevant documents present and verified  Test results: test results available and properly labeled  Site marked: the operative site was marked  Radiology Images displayed and confirmed   If images not available, report reviewed: imaging studies available  Required items: required blood products, implants, devices, and special equipment available  Patient identity confirmed: verbally with patient    Supporting Documentation  Indications: pain   Procedure Details  Location: knee - bilateral knee  Preparation: Patient was prepped and draped in the usual sterile fashion  Needle gauge: 21G 2"  Ultrasound guidance: no  Approach: anteromedial    Medications (Right): 2 mL bupivacaine 0 25 %; 2 mL lidocaine 1 %; 3 mL lidocaine 1 %; 40 mg triamcinolone acetonide 40 mg/mLMedications (Left): 2 mL bupivacaine 0 25 %; 2 mL lidocaine 1 %; 3 mL lidocaine 1 %; 40 mg triamcinolone acetonide 40 mg/mL   Patient tolerance: patient tolerated the procedure well with no immediate complications  Dressing:  Sterile dressing applied

## 2022-10-24 ENCOUNTER — PROCEDURE VISIT (OUTPATIENT)
Dept: OBGYN CLINIC | Facility: CLINIC | Age: 67
End: 2022-10-24
Payer: COMMERCIAL

## 2022-10-24 VITALS
BODY MASS INDEX: 46.65 KG/M2 | SYSTOLIC BLOOD PRESSURE: 157 MMHG | HEIGHT: 69 IN | DIASTOLIC BLOOD PRESSURE: 89 MMHG | HEART RATE: 70 BPM | WEIGHT: 315 LBS

## 2022-10-24 DIAGNOSIS — M17.0 PRIMARY OSTEOARTHRITIS OF BOTH KNEES: Primary | ICD-10-CM

## 2022-10-24 PROCEDURE — 20610 DRAIN/INJ JOINT/BURSA W/O US: CPT | Performed by: FAMILY MEDICINE

## 2022-10-24 RX ORDER — LIDOCAINE HYDROCHLORIDE 10 MG/ML
2 INJECTION, SOLUTION INFILTRATION; PERINEURAL
Status: COMPLETED | OUTPATIENT
Start: 2022-10-24 | End: 2022-10-24

## 2022-10-24 RX ORDER — BUPIVACAINE HYDROCHLORIDE 2.5 MG/ML
1 INJECTION, SOLUTION INFILTRATION; PERINEURAL
Status: COMPLETED | OUTPATIENT
Start: 2022-10-24 | End: 2022-10-24

## 2022-10-24 RX ADMIN — BUPIVACAINE HYDROCHLORIDE 1 ML: 2.5 INJECTION, SOLUTION INFILTRATION; PERINEURAL at 14:32

## 2022-10-24 RX ADMIN — LIDOCAINE HYDROCHLORIDE 2 ML: 10 INJECTION, SOLUTION INFILTRATION; PERINEURAL at 14:32

## 2022-10-24 NOTE — PROGRESS NOTES
Assessment/Plan:  Assessment/Plan   Diagnoses and all orders for this visit:    Primary osteoarthritis of both knees  -     Large joint arthrocentesis: bilateral knee        77-year-old male with osteoarthritis of both knees with bilateral knee pain many years duration  Clinical impression is that he is symptomatic from degenerative changes  He agreed to proceed with viscosupplementation  I administered Durolane Visco supplement to both knees without complication  He is advised to allow 3-4 weeks before he may notice full effects of Visco supplement  If Visco injection is to be repeated we must wait at least 6 months  He will follow up as needed  Subjective:   Patient ID: Otto Fitzpatrick is a 79 y o  male  Chief Complaint   Patient presents with   • Right Knee - Pain, Follow-up   • Left Knee - Follow-up, Pain       77-year-old male with osteoarthritis of both knees following up for bilateral knee pain of many years duration  He was last seen by me 2 months ago at which point he was given steroid injection to both knees and we requested for Visco injection  He presents today for Visco injection to both knees  He reports having pain described as generalized to the knees, radiating distally to lower legs, worse with bearing weight and ambulating, associated with swelling, and improved with resting  He uses topical diclofenac to help with pain  Knee Pain  This is a chronic problem  The current episode started more than 1 year ago  The problem occurs daily  The problem has been gradually worsening  Associated symptoms include arthralgias and joint swelling  Pertinent negatives include no numbness or weakness  The symptoms are aggravated by standing and walking  He has tried rest (Corticosteroid injection, topical diclofenac) for the symptoms  The treatment provided mild relief  Review of Systems   Musculoskeletal: Positive for arthralgias and joint swelling     Neurological: Negative for weakness and numbness  Objective:  Vitals:    10/24/22 1311   BP: 157/89   Pulse: 70   Weight: (!) 144 kg (318 lb)   Height: 5' 9" (1 753 m)     Right Knee Exam     Other   Swelling: mild      Left Knee Exam     Other   Swelling: mild            Physical Exam  Vitals and nursing note reviewed  Constitutional:       General: He is not in acute distress  Appearance: He is well-developed  He is not ill-appearing or diaphoretic  HENT:      Head: Normocephalic and atraumatic  Right Ear: External ear normal       Left Ear: External ear normal    Eyes:      Conjunctiva/sclera: Conjunctivae normal    Neck:      Trachea: No tracheal deviation  Cardiovascular:      Rate and Rhythm: Normal rate  Pulmonary:      Effort: Pulmonary effort is normal  No respiratory distress  Abdominal:      General: There is no distension  Musculoskeletal:         General: Swelling present  Skin:     General: Skin is warm and dry  Coloration: Skin is not jaundiced or pale  Neurological:      Mental Status: He is alert and oriented to person, place, and time  Psychiatric:         Mood and Affect: Mood normal          Behavior: Behavior normal          Thought Content: Thought content normal          Judgment: Judgment normal              Large joint arthrocentesis: bilateral knee  Universal Protocol:  Consent: Verbal consent obtained  Risks and benefits: risks, benefits and alternatives were discussed  Consent given by: patient  Time out: Immediately prior to procedure a "time out" was called to verify the correct patient, procedure, equipment, support staff and site/side marked as required    Patient understanding: patient states understanding of the procedure being performed  Patient consent: the patient's understanding of the procedure matches consent given  Procedure consent: procedure consent matches procedure scheduled  Relevant documents: relevant documents present and verified  Test results: test results available and properly labeled  Site marked: the operative site was marked  Radiology Images displayed and confirmed   If images not available, report reviewed: imaging studies available  Required items: required blood products, implants, devices, and special equipment available  Patient identity confirmed: verbally with patient    Supporting Documentation  Indications: pain   Procedure Details  Location: knee - bilateral knee  Preparation: Patient was prepped and draped in the usual sterile fashion  Needle gauge: 21G 2"  Ultrasound guidance: no  Approach: anteromedial    Medications (Right): 3 mL sodium hyaluronate 60 MG/3ML; 1 mL bupivacaine 0 25 %; 2 mL lidocaine 1 %Medications (Left): 3 mL sodium hyaluronate 60 MG/3ML; 1 mL bupivacaine 0 25 %; 2 mL lidocaine 1 %   Patient tolerance: patient tolerated the procedure well with no immediate complications  Dressing:  Sterile dressing applied

## 2022-11-15 ENCOUNTER — OFFICE VISIT (OUTPATIENT)
Dept: INTERNAL MEDICINE CLINIC | Facility: CLINIC | Age: 67
End: 2022-11-15

## 2022-11-15 VITALS
HEIGHT: 69 IN | DIASTOLIC BLOOD PRESSURE: 82 MMHG | HEART RATE: 59 BPM | BODY MASS INDEX: 46.65 KG/M2 | WEIGHT: 315 LBS | SYSTOLIC BLOOD PRESSURE: 138 MMHG | OXYGEN SATURATION: 94 % | RESPIRATION RATE: 18 BRPM | TEMPERATURE: 97.3 F

## 2022-11-15 DIAGNOSIS — H10.33 ACUTE BACTERIAL CONJUNCTIVITIS OF BOTH EYES: ICD-10-CM

## 2022-11-15 DIAGNOSIS — J01.90 ACUTE RHINOSINUSITIS: ICD-10-CM

## 2022-11-15 DIAGNOSIS — R05.1 ACUTE COUGH: Primary | ICD-10-CM

## 2022-11-15 DIAGNOSIS — R60.0 LOCALIZED EDEMA: ICD-10-CM

## 2022-11-15 LAB
SARS-COV-2 AG UPPER RESP QL IA: NEGATIVE
VALID CONTROL: NORMAL

## 2022-11-15 RX ORDER — CETIRIZINE HYDROCHLORIDE 10 MG/1
10 TABLET, CHEWABLE ORAL DAILY
Qty: 20 TABLET | Refills: 0 | Status: SHIPPED | OUTPATIENT
Start: 2022-11-15

## 2022-11-15 RX ORDER — FLUTICASONE PROPIONATE 50 MCG
1 SPRAY, SUSPENSION (ML) NASAL DAILY
Qty: 15.8 ML | Refills: 0 | Status: SHIPPED | OUTPATIENT
Start: 2022-11-15

## 2022-11-15 NOTE — ASSESSMENT & PLAN NOTE
Patient with +2 edema bilateral lower extremity  This is a chronic condition, continue Lasix and maintain a low sodium diet  Encourage wearing compression stockings when awake and elevating legs  Contact the office for increased weight gain or leg swelling

## 2022-11-15 NOTE — PROGRESS NOTES
Name: Carmelina Reeder      : 1955      MRN: [id number]  Encounter Provider: RADHA Hernández  Encounter Date: 11/15/2022   Encounter department: [facility]   [address]     1  Acute cough  Comments:  Cough times several days, no relief with Coricidin  Patient will contact the office if symptoms worsen or no relief after current interventions  Orders:  -     POCT Rapid Covid Ag  -     fluticasone (FLONASE) 50 mcg/act nasal spray; 1 spray into each nostril daily  -     cetirizine (ZyrTEC) 10 MG chewable tablet; Chew 1 tablet (10 mg total) daily  -     dextromethorphan-guaifenesin (MUCINEX DM)  MG per 12 hr tablet; Take 1 tablet by mouth every 12 (twelve) hours    2  Acute rhinosinusitis  Comments:  COVID negative  Orders:  -     fluticasone (FLONASE) 50 mcg/act nasal spray; 1 spray into each nostril daily  -     cetirizine (ZyrTEC) 10 MG chewable tablet; Chew 1 tablet (10 mg total) daily  -     dextromethorphan-guaifenesin (MUCINEX DM)  MG per 12 hr tablet; Take 1 tablet by mouth every 12 (twelve) hours    3  Acute bacterial conjunctivitis of both eyes  -     bacitracin-polymyxin b (POLYSPORIN) ophthalmic ointment; Administer to both eyes 3 (three) times a day    4  Localized edema  Assessment & Plan:  Patient with +2 edema bilateral lower extremity  This is a chronic condition, continue Lasix and maintain a low sodium diet  Encourage wearing compression stockings when awake and elevating legs  Contact the office for increased weight gain or leg swelling  Subjective      Cough  This is a new problem  The current episode started in the past 7 days  The problem has been gradually worsening  The problem occurs hourly  The cough is productive of sputum  Associated symptoms include eye redness, headaches, nasal congestion, postnasal drip and a sore throat   Pertinent negatives include no chest pain, chills, ear congestion, ear pain, fever, rash, rhinorrhea, shortness of breath or wheezing  The symptoms are aggravated by lying down and exercise  He has tried OTC cough suppressant for the symptoms  The treatment provided mild relief  There is no history of asthma, COPD, environmental allergies or pneumonia  Review of Systems   Constitutional: Negative for chills and fever  HENT: Positive for postnasal drip and sore throat  Negative for ear pain, rhinorrhea, sinus pressure and sinus pain  Eyes: Positive for discharge and redness  Negative for visual disturbance  Respiratory: Positive for cough  Negative for shortness of breath and wheezing  Cardiovascular: Negative for chest pain  Gastrointestinal: Negative  Musculoskeletal: Negative  Skin: Negative for rash  Allergic/Immunologic: Negative for environmental allergies and immunocompromised state  Neurological: Positive for headaches  Psychiatric/Behavioral: Negative for confusion         Current Outpatient Medications on File Prior to Visit   Medication Sig   • Ascorbic Acid (VITAMIN C PO) Take 100 mg by mouth daily   • atorvastatin (LIPITOR) 40 mg tablet Take 1 tablet (40 mg total) by mouth daily with dinner   • carvedilol (COREG) 25 mg tablet Take 1 tablet (25 mg total) by mouth 2 (two) times a day with meals   • clopidogrel (PLAVIX) 75 mg tablet Take 1 tablet (75 mg total) by mouth daily   • DULoxetine (CYMBALTA) 60 mg delayed release capsule Take 1 capsule (60 mg total) by mouth daily   • furosemide (LASIX) 40 mg tablet TAKE 1 TABLET BY MOUTH EVERY DAY   • gabapentin (NEURONTIN) 400 mg capsule Take 1 capsule (400 mg total) by mouth 2 (two) times a day   • losartan (COZAAR) 100 MG tablet Take 1 tablet (100 mg total) by mouth daily   • Omega-3 Fatty Acids (FISH OIL PO) Take 1,000 mg by mouth daily   • ranolazine (RANEXA) 1000 MG SR tablet Take 1 tablet (1,000 mg total) by mouth every 12 (twelve) hours   • ZINC GLUCONATE PO Take 50 mg by mouth daily   • [DISCONTINUED] apixaban (ELIQUIS) 5 mg Take 1 tablet (5 mg total) by mouth 2 (two) times a day       Objective     /82 (BP Location: Left arm, Patient Position: Sitting, Cuff Size: Standard)   Pulse 59   Temp (!) 97 3 °F (36 3 °C) (Temporal)   Resp 18   Ht 5' 9" (1 753 m)   Wt (!) 143 kg (315 lb 3 2 oz)   SpO2 94%   BMI 46 55 kg/m²     Physical Exam  Constitutional:       Appearance: Normal appearance  HENT:      Right Ear: There is impacted cerumen  Left Ear: There is impacted cerumen  Nose: Congestion present  Mouth/Throat:      Pharynx: No oropharyngeal exudate or posterior oropharyngeal erythema  Eyes:      General: Scleral icterus present  Cardiovascular:      Rate and Rhythm: Normal rate and regular rhythm  Pulses: Normal pulses  Pulmonary:      Effort: Pulmonary effort is normal       Breath sounds: Normal breath sounds  Musculoskeletal:      Right lower leg: Edema present  Left lower leg: Edema present  Lymphadenopathy:      Cervical: No cervical adenopathy  Skin:     Findings: No rash  Neurological:      Mental Status: He is alert     Psychiatric:         Mood and Affect: Mood normal          Behavior: Behavior normal        RADHA Mason

## 2022-11-20 DIAGNOSIS — E78.00 PURE HYPERCHOLESTEROLEMIA: ICD-10-CM

## 2022-11-21 DIAGNOSIS — I25.118 CORONARY ARTERY DISEASE OF NATIVE ARTERY OF NATIVE HEART WITH STABLE ANGINA PECTORIS (HCC): ICD-10-CM

## 2022-11-21 DIAGNOSIS — F32.A DEPRESSIVE DISORDER: ICD-10-CM

## 2022-11-21 DIAGNOSIS — I50.32 CHRONIC DIASTOLIC CONGESTIVE HEART FAILURE (HCC): ICD-10-CM

## 2022-11-21 RX ORDER — CARVEDILOL 25 MG/1
TABLET ORAL
Qty: 180 TABLET | Refills: 3 | Status: SHIPPED | OUTPATIENT
Start: 2022-11-21

## 2022-11-21 RX ORDER — ATORVASTATIN CALCIUM 40 MG/1
TABLET, FILM COATED ORAL
Qty: 90 TABLET | Refills: 3 | Status: SHIPPED | OUTPATIENT
Start: 2022-11-21

## 2022-11-21 RX ORDER — LOSARTAN POTASSIUM 100 MG/1
TABLET ORAL
Qty: 90 TABLET | Refills: 3 | Status: SHIPPED | OUTPATIENT
Start: 2022-11-21

## 2022-11-21 RX ORDER — DULOXETIN HYDROCHLORIDE 60 MG/1
CAPSULE, DELAYED RELEASE ORAL
Qty: 90 CAPSULE | Refills: 3 | Status: SHIPPED | OUTPATIENT
Start: 2022-11-21

## 2022-12-05 DIAGNOSIS — G62.9 NEUROPATHY: ICD-10-CM

## 2022-12-05 RX ORDER — GABAPENTIN 400 MG/1
CAPSULE ORAL
Qty: 180 CAPSULE | Refills: 3 | Status: SHIPPED | OUTPATIENT
Start: 2022-12-05

## 2022-12-07 DIAGNOSIS — R05.1 ACUTE COUGH: ICD-10-CM

## 2022-12-07 DIAGNOSIS — J01.90 ACUTE RHINOSINUSITIS: ICD-10-CM

## 2022-12-07 RX ORDER — FLUTICASONE PROPIONATE 50 MCG
SPRAY, SUSPENSION (ML) NASAL
Qty: 48 ML | Refills: 1 | Status: SHIPPED | OUTPATIENT
Start: 2022-12-07

## 2023-01-05 RX ORDER — HYALURONATE SODIUM, STABILIZED 60 MG/3 ML
SYRINGE (ML) INTRAARTICULAR
COMMUNITY
Start: 2022-09-28

## 2023-01-06 ENCOUNTER — APPOINTMENT (OUTPATIENT)
Dept: LAB | Facility: CLINIC | Age: 68
End: 2023-01-06

## 2023-01-06 ENCOUNTER — OFFICE VISIT (OUTPATIENT)
Dept: INTERNAL MEDICINE CLINIC | Facility: CLINIC | Age: 68
End: 2023-01-06

## 2023-01-06 VITALS
DIASTOLIC BLOOD PRESSURE: 76 MMHG | SYSTOLIC BLOOD PRESSURE: 118 MMHG | OXYGEN SATURATION: 95 % | TEMPERATURE: 97.7 F | HEIGHT: 69 IN | BODY MASS INDEX: 45.11 KG/M2 | HEART RATE: 58 BPM | WEIGHT: 304.6 LBS | RESPIRATION RATE: 18 BRPM

## 2023-01-06 DIAGNOSIS — F33.1 MODERATE EPISODE OF RECURRENT MAJOR DEPRESSIVE DISORDER (HCC): ICD-10-CM

## 2023-01-06 DIAGNOSIS — R73.01 IMPAIRED FASTING GLUCOSE: ICD-10-CM

## 2023-01-06 DIAGNOSIS — I25.118 CORONARY ARTERY DISEASE OF NATIVE ARTERY OF NATIVE HEART WITH STABLE ANGINA PECTORIS (HCC): ICD-10-CM

## 2023-01-06 DIAGNOSIS — E66.01 MORBID OBESITY (HCC): ICD-10-CM

## 2023-01-06 DIAGNOSIS — Z11.59 NEED FOR HEPATITIS C SCREENING TEST: ICD-10-CM

## 2023-01-06 DIAGNOSIS — I25.118 CORONARY ARTERY DISEASE OF NATIVE ARTERY OF NATIVE HEART WITH STABLE ANGINA PECTORIS (HCC): Primary | ICD-10-CM

## 2023-01-06 DIAGNOSIS — I34.0 NONRHEUMATIC MITRAL VALVE REGURGITATION: ICD-10-CM

## 2023-01-06 DIAGNOSIS — E78.00 PURE HYPERCHOLESTEROLEMIA: ICD-10-CM

## 2023-01-06 DIAGNOSIS — I50.32 CHRONIC DIASTOLIC CONGESTIVE HEART FAILURE (HCC): ICD-10-CM

## 2023-01-06 DIAGNOSIS — Z12.5 PROSTATE CANCER SCREENING: ICD-10-CM

## 2023-01-06 DIAGNOSIS — I35.0 NONRHEUMATIC AORTIC VALVE STENOSIS: ICD-10-CM

## 2023-01-06 DIAGNOSIS — Z12.11 COLON CANCER SCREENING: ICD-10-CM

## 2023-01-06 DIAGNOSIS — Z23 ENCOUNTER FOR IMMUNIZATION: ICD-10-CM

## 2023-01-06 RX ORDER — CLOPIDOGREL BISULFATE 75 MG/1
TABLET ORAL
Qty: 90 TABLET | Refills: 3 | Status: SHIPPED | OUTPATIENT
Start: 2023-01-06

## 2023-01-06 RX ORDER — RANOLAZINE 1000 MG/1
1000 TABLET, EXTENDED RELEASE ORAL EVERY 12 HOURS SCHEDULED
Qty: 180 TABLET | Refills: 2 | Status: SHIPPED | OUTPATIENT
Start: 2023-01-06

## 2023-01-06 RX ORDER — BUPROPION HYDROCHLORIDE 150 MG/1
150 TABLET ORAL EVERY MORNING
Qty: 30 TABLET | Refills: 5 | Status: SHIPPED | OUTPATIENT
Start: 2023-01-06

## 2023-01-06 NOTE — PATIENT INSTRUCTIONS
Patient with the above issues    Fatigue with exertion bad heart murmur and prior "moderate to severe "aortic stenosis  I think this needs to be pushed a bit further and would like to recheck the echo and go back to cardiology    Depression: Addition of Wellbutrin 75 potentially advancing to 150    Osteoarthrosis to be followed by orthopedics but    Morbid obesity is a major  here and I would like him to see the bariatric weight management group  "Routine "laboratory testing and also a Cologuard

## 2023-01-06 NOTE — PROGRESS NOTES
Assessment/Plan:       Diagnoses and all orders for this visit:    Coronary artery disease of native artery of native heart with stable angina pectoris (HCC)  -     NT-BNP PRO; Future  -     CBC and differential; Future  -     Lipid Panel with Direct LDL reflex; Future  -     Comprehensive metabolic panel; Future  -     TSH, 3rd generation with Free T4 reflex; Future  -     UA (URINE) with reflex to Scope    Need for hepatitis C screening test  -     Hepatitis C antibody; Future    Encounter for immunization  -     influenza vaccine, high-dose, PF 0 7 mL (FLUZONE HIGH-DOSE)    Nonrheumatic aortic valve stenosis  -     Echo complete w/ contrast if indicated; Future    Nonrheumatic mitral valve regurgitation  -     Echo complete w/ contrast if indicated; Future    Impaired fasting glucose  -     Hemoglobin A1C; Future    Pure hypercholesterolemia    Prostate cancer screening  -     PSA, Total Screen; Future    Colon cancer screening  -     Cancel: Cologuard  -     Cologuard    Morbid obesity ([facility] [address] )  -     Ambulatory Referral to Weight Management; Future    Moderate episode of recurrent major depressive disorder (HCC)  -     buPROPion (Wellbutrin XL) 150 mg 24 hr tablet; Take 1 tablet (150 mg total) by mouth every morning    Other orders  -     Durolane 60 MG/3ML injection;  (Patient not taking: Reported on 1/6/2023)                Subjective:      Patient ID: Ana Laura Wise is a 79 y o  male  51-year-old  Numerous problems  Subjectively what he has is easy fatigability with exertion, severe osteoarthritic pain especially involving the knees, and reactive depression due to a poor relationship with his wife    Known diagnosis  Coronary artery disease status post bypass about 7 years ago  Moderate to severe aortic stenosis documented on echocardiography in April; he has a corresponding decrescendo murmur  At same echo documented EF of 45% without diastolic dysfunction    Obstructive sleep apnea; uses CPAP with a setting of an centimeters      Currently still employed as a ; formally an HVAC repair person  No cigarettes  Minimal if any alcohol  No illicit drugs  Allergy to sulfa    Surgical history coronary artery bypass graft and multiple stents  Tonsil and adenoid attempted to alleviate obstructive sleep apnea with working diagnosis of crowded upper airway but apparently this did not help much  The following portions of the patient's history were reviewed and updated as appropriate:   He has a past medical history of Cough, Sleep apnea, obstructive, SOB (shortness of breath), and Wheezing ,  does not have any pertinent problems on file  ,   has no past surgical history on file  ,  family history includes Heart disease in his father  ,   reports that he has never smoked  He has never used smokeless tobacco  He reports that he does not drink alcohol and does not use drugs  ,  is allergic to medical tape, sulfa antibiotics, fosinopril, lisinopril, and wound dressing adhesive     Current Outpatient Medications   Medication Sig Dispense Refill   • Ascorbic Acid (VITAMIN C PO) Take 100 mg by mouth daily     • atorvastatin (LIPITOR) 40 mg tablet TAKE 1 TABLET BY MOUTH DAILY WITH DINNER 90 tablet 3   • buPROPion (Wellbutrin XL) 150 mg 24 hr tablet Take 1 tablet (150 mg total) by mouth every morning 30 tablet 5   • carvedilol (COREG) 25 mg tablet TAKE 1 TABLET BY MOUTH TWICE A DAY WITH FOOD 180 tablet 3   • DULoxetine (CYMBALTA) 60 mg delayed release capsule TAKE 1 CAPSULE BY MOUTH EVERY DAY 90 capsule 3   • furosemide (LASIX) 40 mg tablet TAKE 1 TABLET BY MOUTH EVERY DAY 90 tablet 1   • gabapentin (NEURONTIN) 400 mg capsule TAKE 1 CAPSULE BY MOUTH 2 TIMES A DAY   180 capsule 3   • losartan (COZAAR) 100 MG tablet TAKE 1 TABLET BY MOUTH EVERY DAY 90 tablet 3   • Omega-3 Fatty Acids (FISH OIL PO) Take 1,000 mg by mouth daily     • ZINC GLUCONATE PO Take 50 mg by mouth daily     • clopidogrel (PLAVIX) 75 mg tablet TAKE 1 TABLET BY MOUTH EVERY DAY 90 tablet 3   • Durolane 60 MG/3ML injection  (Patient not taking: Reported on 1/6/2023)     • ranolazine (RANEXA) 1000 MG SR tablet TAKE 1 TABLET (1,000 MG TOTAL) BY MOUTH EVERY 12 (TWELVE) HOURS 180 tablet 2     No current facility-administered medications for this visit  Review of Systems   Constitutional: Positive for fatigue  Respiratory: Positive for shortness of breath  Cardiovascular: Positive for leg swelling  Musculoskeletal: Positive for arthralgias and back pain  Skin: Positive for rash  Stasis dermatosis both calves   Psychiatric/Behavioral: Positive for decreased concentration and dysphoric mood  The patient is nervous/anxious  Objective:  Vitals:    01/06/23 1335   BP: 118/76   Pulse: 58   Resp: 18   Temp: 97 7 °F (36 5 °C)   SpO2: 95%      Physical Exam  Constitutional:       Appearance: He is well-developed  He is obese  He is ill-appearing  Comments: Male patient who appears to be the stated age; he does not look acutely ill but he does appear chronically ill, morbidly obese person with 2+ edema and associated stasis who moves quite slowly using a quad cane for ambulatory support and clearly has knee pain   HENT:      Head: Normocephalic and atraumatic  Eyes:      Pupils: Pupils are equal, round, and reactive to light  Neck:      Thyroid: No thyromegaly  Trachea: No tracheal deviation  Cardiovascular:      Rate and Rhythm: Normal rate and regular rhythm  Heart sounds: Murmur heard  Decrescendo systolic murmur is present with a grade of 3/6  No gallop  Comments: The over 6 decrescendo systolic murmur heard best left and right sternal borders  Pulmonary:      Effort: Pulmonary effort is normal  No respiratory distress  Breath sounds: No wheezing or rales  Abdominal:      General: Bowel sounds are normal       Palpations: Abdomen is soft  Tenderness: There is no abdominal tenderness  Musculoskeletal:         General: No tenderness or deformity  Normal range of motion  Cervical back: Normal range of motion and neck supple  Skin:     General: Skin is warm and dry  Comments: Chronic stasis dermatosis of both calves right just a bit worse than left  Neurological:      Mental Status: He is alert and oriented to person, place, and time  Coordination: Coordination normal       Deep Tendon Reflexes: Reflexes are normal and symmetric  Psychiatric:         Attention and Perception: Attention normal          Mood and Affect: Mood is depressed  Speech: Speech normal          Behavior: Behavior normal          Cognition and Memory: Cognition normal            Patient Instructions   Patient with the above issues    Fatigue with exertion bad heart murmur and prior "moderate to severe "aortic stenosis  I think this needs to be pushed a bit further and would like to recheck the echo and go back to cardiology    Depression: Addition of Wellbutrin 75 potentially advancing to 150    Osteoarthrosis to be followed by orthopedics but    Morbid obesity is a major  here and I would like him to see the bariatric weight management group  "Routine "laboratory testing and also a Cologuard

## 2023-01-07 LAB
ALBUMIN SERPL BCP-MCNC: 4.1 G/DL (ref 3.5–5)
ALP SERPL-CCNC: 87 U/L (ref 46–116)
ALT SERPL W P-5'-P-CCNC: 49 U/L (ref 12–78)
ANION GAP SERPL CALCULATED.3IONS-SCNC: 7 MMOL/L (ref 4–13)
AST SERPL W P-5'-P-CCNC: 27 U/L (ref 5–45)
BASOPHILS # BLD AUTO: 0.06 THOUSANDS/ÂΜL (ref 0–0.1)
BASOPHILS NFR BLD AUTO: 1 % (ref 0–1)
BILIRUB SERPL-MCNC: 0.56 MG/DL (ref 0.2–1)
BILIRUB UR QL STRIP: NEGATIVE
BUN SERPL-MCNC: 21 MG/DL (ref 5–25)
CALCIUM SERPL-MCNC: 8.9 MG/DL (ref 8.3–10.1)
CHLORIDE SERPL-SCNC: 108 MMOL/L (ref 96–108)
CHOLEST SERPL-MCNC: 121 MG/DL
CLARITY UR: CLEAR
CO2 SERPL-SCNC: 26 MMOL/L (ref 21–32)
COLOR UR: NORMAL
CREAT SERPL-MCNC: 0.99 MG/DL (ref 0.6–1.3)
EOSINOPHIL # BLD AUTO: 0.16 THOUSAND/ÂΜL (ref 0–0.61)
EOSINOPHIL NFR BLD AUTO: 2 % (ref 0–6)
ERYTHROCYTE [DISTWIDTH] IN BLOOD BY AUTOMATED COUNT: 15.1 % (ref 11.6–15.1)
EST. AVERAGE GLUCOSE BLD GHB EST-MCNC: 105 MG/DL
GFR SERPL CREATININE-BSD FRML MDRD: 78 ML/MIN/1.73SQ M
GLUCOSE P FAST SERPL-MCNC: 97 MG/DL (ref 65–99)
GLUCOSE UR STRIP-MCNC: NEGATIVE MG/DL
HBA1C MFR BLD: 5.3 %
HCT VFR BLD AUTO: 40.8 % (ref 36.5–49.3)
HCV AB SER QL: NORMAL
HDLC SERPL-MCNC: 45 MG/DL
HGB BLD-MCNC: 13.3 G/DL (ref 12–17)
HGB UR QL STRIP.AUTO: NEGATIVE
IMM GRANULOCYTES # BLD AUTO: 0.02 THOUSAND/UL (ref 0–0.2)
IMM GRANULOCYTES NFR BLD AUTO: 0 % (ref 0–2)
KETONES UR STRIP-MCNC: NEGATIVE MG/DL
LDLC SERPL CALC-MCNC: 29 MG/DL (ref 0–100)
LEUKOCYTE ESTERASE UR QL STRIP: NEGATIVE
LYMPHOCYTES # BLD AUTO: 2.09 THOUSANDS/ÂΜL (ref 0.6–4.47)
LYMPHOCYTES NFR BLD AUTO: 27 % (ref 14–44)
MCH RBC QN AUTO: 30.9 PG (ref 26.8–34.3)
MCHC RBC AUTO-ENTMCNC: 32.6 G/DL (ref 31.4–37.4)
MCV RBC AUTO: 95 FL (ref 82–98)
MONOCYTES # BLD AUTO: 0.83 THOUSAND/ÂΜL (ref 0.17–1.22)
MONOCYTES NFR BLD AUTO: 11 % (ref 4–12)
NEUTROPHILS # BLD AUTO: 4.73 THOUSANDS/ÂΜL (ref 1.85–7.62)
NEUTS SEG NFR BLD AUTO: 59 % (ref 43–75)
NITRITE UR QL STRIP: NEGATIVE
NRBC BLD AUTO-RTO: 0 /100 WBCS
NT-PROBNP SERPL-MCNC: 90 PG/ML
PH UR STRIP.AUTO: 6 [PH]
PLATELET # BLD AUTO: 253 THOUSANDS/UL (ref 149–390)
PMV BLD AUTO: 10.5 FL (ref 8.9–12.7)
POTASSIUM SERPL-SCNC: 3.9 MMOL/L (ref 3.5–5.3)
PROT SERPL-MCNC: 7.5 G/DL (ref 6.4–8.4)
PROT UR STRIP-MCNC: NEGATIVE MG/DL
PSA SERPL-MCNC: 0.9 NG/ML (ref 0–4)
RBC # BLD AUTO: 4.3 MILLION/UL (ref 3.88–5.62)
SODIUM SERPL-SCNC: 141 MMOL/L (ref 135–147)
SP GR UR STRIP.AUTO: 1.01 (ref 1–1.03)
TRIGL SERPL-MCNC: 234 MG/DL
TSH SERPL DL<=0.05 MIU/L-ACNC: 0.6 UIU/ML (ref 0.45–4.5)
UROBILINOGEN UR STRIP-ACNC: <2 MG/DL
WBC # BLD AUTO: 7.89 THOUSAND/UL (ref 4.31–10.16)

## 2023-01-10 ENCOUNTER — OFFICE VISIT (OUTPATIENT)
Dept: OBGYN CLINIC | Facility: CLINIC | Age: 68
End: 2023-01-10

## 2023-01-10 ENCOUNTER — APPOINTMENT (OUTPATIENT)
Dept: RADIOLOGY | Facility: CLINIC | Age: 68
End: 2023-01-10

## 2023-01-10 VITALS
DIASTOLIC BLOOD PRESSURE: 92 MMHG | HEART RATE: 57 BPM | SYSTOLIC BLOOD PRESSURE: 183 MMHG | WEIGHT: 305 LBS | HEIGHT: 69 IN | BODY MASS INDEX: 45.18 KG/M2

## 2023-01-10 DIAGNOSIS — G57.91 NEUROPATHY OF RIGHT LOWER EXTREMITY: ICD-10-CM

## 2023-01-10 DIAGNOSIS — M17.0 PRIMARY OSTEOARTHRITIS OF BOTH KNEES: Primary | ICD-10-CM

## 2023-01-10 DIAGNOSIS — M17.0 PRIMARY OSTEOARTHRITIS OF BOTH KNEES: ICD-10-CM

## 2023-01-10 RX ORDER — LIDOCAINE 50 MG/G
1 PATCH TOPICAL DAILY
Qty: 30 PATCH | Refills: 1 | Status: SHIPPED | OUTPATIENT
Start: 2023-01-10 | End: 2023-01-23 | Stop reason: SDUPTHER

## 2023-01-10 NOTE — PROGRESS NOTES
Assessment/Plan:  Assessment/Plan   Diagnoses and all orders for this visit:    Primary osteoarthritis of both knees  -     XR knee 4+ vw right injury; Future  -     XR knee 4+ vw left injury; Future  -     Ambulatory Referral to Pain Management; Future  -     Ambulatory Referral to Orthopedic Surgery; Future  -     Medial  Knee Brace    Neuropathy of right lower extremity  -     lidocaine (Lidoderm) 5 %; Apply 1 patch topically daily Remove & Discard patch within 12 hours or as directed by MD       55-year-old male with osteoarthritis of both knees with bilateral knee pain many years duration  Discussed with patient physical exam, imaging studies, impression and plan  X-rays both knees noted for severe medial joint space narrowing with bone-on-bone and genu varum  Physical exam both knees noted for swelling and genu varum  He has extension to -5  There is laxity with valgus stress both knees  Clinical impression is that he is symptomatic from degenerative changes  He has had no improvement with steroid injections and viscosupplementation  He would like to explore more  Invasive options  I will refer him to orthopedic surgeon for further evaluation  I will also refer him to pain management for consideration of genicular nerve block  I will refer him for medial  braces  He may apply topical lidocaine to help with pain  He will follow up with me as needed  Subjective:   Patient ID: Otto Darnell is a 79 y o  male  Chief Complaint   Patient presents with   • Left Knee - Follow-up, Pain   • Right Knee - Follow-up, Pain       55-year-old male with osteoarthritis of both knees following up for bilateral knee pain of many years duration  He was last seen by me 2 5 months ago at which point he received Visco injection to both knees  He denies any improvement in symptoms following Visco injections  Pain in knees have been worsening    He has pain described as generalized knee but worse at the medial aspect, radiating distally to lower legs, worse with bearing weight and ambulating, associated with swelling, associated with instability and improved with resting  He has been applying topical diclofenac to help with pain and trying topical over-the-counter lidocaine  He ambulates with walking cane to help with stability  Knee Pain  This is a chronic problem  The current episode started more than 1 year ago  The problem occurs constantly  The problem has been gradually worsening  Associated symptoms include arthralgias, joint swelling, numbness and weakness  The symptoms are aggravated by standing, walking and bending  He has tried rest and position changes (  Corticosteroid injection, viscosupplementation, topical diclofenac) for the symptoms  The treatment provided mild relief  Review of Systems   Musculoskeletal: Positive for arthralgias and joint swelling  Neurological: Positive for weakness and numbness  Objective:  Vitals:    01/10/23 1741   BP: (!) 183/92   Pulse: 57   Weight: (!) 138 kg (305 lb)   Height: 5' 9" (1 753 m)     Right Knee Exam     Muscle Strength   The patient has normal right knee strength  Tenderness   The patient is experiencing tenderness in the medial joint line  Range of Motion   Extension: -5     Tests   Valgus: positive    Other   Swelling: mild    Comments:    Genu varum      Left Knee Exam     Muscle Strength   The patient has normal left knee strength  Tenderness   The patient is experiencing tenderness in the medial joint line  Range of Motion   Extension: -5     Tests   Valgus: positive    Other   Swelling: mild    Comments:   Genu varum            Physical Exam  Vitals and nursing note reviewed  Constitutional:       General: He is not in acute distress  Appearance: He is well-developed  He is not ill-appearing or diaphoretic  HENT:      Head: Normocephalic and atraumatic        Right Ear: External ear normal       Left Ear: External ear normal    Eyes:      Conjunctiva/sclera: Conjunctivae normal    Neck:      Trachea: No tracheal deviation  Cardiovascular:      Rate and Rhythm: Normal rate  Pulmonary:      Effort: Pulmonary effort is normal  No respiratory distress  Abdominal:      General: There is no distension  Musculoskeletal:         General: Swelling and tenderness present  Right lower leg: Edema present  Left lower leg: Edema present  Skin:     General: Skin is warm and dry  Coloration: Skin is not jaundiced or pale  Neurological:      Mental Status: He is alert and oriented to person, place, and time  Psychiatric:         Mood and Affect: Mood normal          Behavior: Behavior normal          Thought Content: Thought content normal          Judgment: Judgment normal          I have personally reviewed pertinent films in PACS and my interpretation is  severe medial joint space narrowing with bone-on-bone and genu varum

## 2023-01-11 ENCOUNTER — HOSPITAL ENCOUNTER (OUTPATIENT)
Dept: NON INVASIVE DIAGNOSTICS | Facility: CLINIC | Age: 68
Discharge: HOME | End: 2023-01-11

## 2023-01-11 ENCOUNTER — TELEPHONE (OUTPATIENT)
Dept: RADIOLOGY | Facility: CLINIC | Age: 68
End: 2023-01-11

## 2023-01-11 VITALS
WEIGHT: 305 LBS | BODY MASS INDEX: 45.18 KG/M2 | HEIGHT: 69 IN | HEART RATE: 57 BPM | DIASTOLIC BLOOD PRESSURE: 92 MMHG | SYSTOLIC BLOOD PRESSURE: 183 MMHG

## 2023-01-11 DIAGNOSIS — I34.0 NONRHEUMATIC MITRAL VALVE REGURGITATION: ICD-10-CM

## 2023-01-11 DIAGNOSIS — I35.0 NONRHEUMATIC AORTIC VALVE STENOSIS: ICD-10-CM

## 2023-01-11 LAB
AORTIC ROOT: 3.6 CM
AORTIC VALVE MEAN VELOCITY: 22.5 M/S
APICAL FOUR CHAMBER EJECTION FRACTION: 57 %
ASCENDING AORTA: 4 CM
AV AREA BY CONTINUOUS VTI: 1.7 CM2
AV AREA PEAK VELOCITY: 1.7 CM2
AV LVOT MEAN GRADIENT: 3 MMHG
AV LVOT PEAK GRADIENT: 6 MMHG
AV MEAN GRADIENT: 23 MMHG
AV PEAK GRADIENT: 44 MMHG
AV VALVE AREA: 1.71 CM2
AV VELOCITY RATIO: 0.37
DOP CALC AO PEAK VEL: 3.31 M/S
DOP CALC AO VTI: 83.08 CM
DOP CALC LVOT AREA: 4.52 CM2
DOP CALC LVOT DIAMETER: 2.4 CM
DOP CALC LVOT PEAK VEL VTI: 31.49 CM
DOP CALC LVOT PEAK VEL: 1.22 M/S
DOP CALC LVOT STROKE INDEX: 57.9 ML/M2
DOP CALC LVOT STROKE VOLUME: 142.39 CM3
E WAVE DECELERATION TIME: 280 MS
FRACTIONAL SHORTENING: 37 % (ref 28–44)
INTERVENTRICULAR SEPTUM IN DIASTOLE (PARASTERNAL SHORT AXIS VIEW): 1.6 CM
INTERVENTRICULAR SEPTUM: 1.6 CM (ref 0.6–1.1)
LAAS-AP2: 24.6 CM2
LAAS-AP4: 23 CM2
LEFT ATRIUM SIZE: 4.7 CM
LEFT INTERNAL DIMENSION IN SYSTOLE: 3.4 CM (ref 2.1–4)
LEFT VENTRICULAR INTERNAL DIMENSION IN DIASTOLE: 5.4 CM (ref 3.5–6)
LEFT VENTRICULAR POSTERIOR WALL IN END DIASTOLE: 1.5 CM
LEFT VENTRICULAR STROKE VOLUME: 94 ML
LVSV (TEICH): 94 ML
MV E'TISSUE VEL-SEP: 7 CM/S
MV PEAK A VEL: 0.78 M/S
MV PEAK E VEL: 108 CM/S
MV STENOSIS PRESSURE HALF TIME: 81 MS
MV VALVE AREA P 1/2 METHOD: 2.72 CM2
RIGHT ATRIAL 2D VOLUME: 47 ML
RIGHT ATRIUM AREA SYSTOLE A4C: 18.4 CM2
RIGHT VENTRICLE ID DIMENSION: 3.7 CM
SL CV LEFT ATRIUM LENGTH A2C: 6.1 CM
SL CV LV EF: 55
SL CV PED ECHO LEFT VENTRICLE DIASTOLIC VOLUME (MOD BIPLANE) 2D: 141 ML
SL CV PED ECHO LEFT VENTRICLE SYSTOLIC VOLUME (MOD BIPLANE) 2D: 47 ML

## 2023-01-11 NOTE — TELEPHONE ENCOUNTER
Per Dr Arvizu Challenger 1/10/23 office note "I will also refer him to pain management for consideration of genicular nerve block "    Schedule as direct referral for injection or need office consult?

## 2023-01-20 ENCOUNTER — CONSULT (OUTPATIENT)
Dept: CARDIOLOGY CLINIC | Facility: CLINIC | Age: 68
End: 2023-01-20

## 2023-01-20 VITALS
SYSTOLIC BLOOD PRESSURE: 132 MMHG | DIASTOLIC BLOOD PRESSURE: 82 MMHG | WEIGHT: 306 LBS | BODY MASS INDEX: 45.32 KG/M2 | RESPIRATION RATE: 16 BRPM | OXYGEN SATURATION: 97 % | HEART RATE: 71 BPM | HEIGHT: 69 IN

## 2023-01-20 DIAGNOSIS — I10 ESSENTIAL HYPERTENSION: ICD-10-CM

## 2023-01-20 DIAGNOSIS — Z98.61 CAD S/P PERCUTANEOUS CORONARY ANGIOPLASTY: ICD-10-CM

## 2023-01-20 DIAGNOSIS — R06.02 SHORTNESS OF BREATH ON EXERTION: Primary | ICD-10-CM

## 2023-01-20 DIAGNOSIS — Z95.1 HX OF CABG: ICD-10-CM

## 2023-01-20 DIAGNOSIS — I50.32 CHRONIC DIASTOLIC HEART FAILURE (HCC): ICD-10-CM

## 2023-01-20 DIAGNOSIS — E78.2 MIXED HYPERLIPIDEMIA: ICD-10-CM

## 2023-01-20 DIAGNOSIS — G47.33 OSA (OBSTRUCTIVE SLEEP APNEA): ICD-10-CM

## 2023-01-20 DIAGNOSIS — I25.10 CAD S/P PERCUTANEOUS CORONARY ANGIOPLASTY: ICD-10-CM

## 2023-01-20 DIAGNOSIS — E66.01 MORBID OBESITY DUE TO EXCESS CALORIES (HCC): ICD-10-CM

## 2023-01-20 DIAGNOSIS — I35.0 NONRHEUMATIC AORTIC VALVE STENOSIS: ICD-10-CM

## 2023-01-20 RX ORDER — ISOSORBIDE MONONITRATE 30 MG/1
30 TABLET, EXTENDED RELEASE ORAL DAILY
Qty: 90 TABLET | Refills: 3 | Status: SHIPPED | OUTPATIENT
Start: 2023-01-20

## 2023-01-20 RX ORDER — NITROGLYCERIN 0.4 MG/1
0.4 TABLET SUBLINGUAL
Qty: 50 TABLET | Refills: 2 | Status: SHIPPED | OUTPATIENT
Start: 2023-01-20

## 2023-01-20 NOTE — PROGRESS NOTES
[address] [facility]  [address], [address] [facility], [place], [facility] Hollie Wilkes  Tel: [phone]      NAME: Otto Okeefe  AGE: 79 y o  SEX: male  : 1955  MRN: [id number]      Chief Complaint:  Chief Complaint   Patient presents with   • New Patient Visit         History of Present Illness:   Ref by Dr Marleny Campbell  24-year-old male with an extensive cardiac history as detailed below who states that he has been getting increased shortness of breath for the last few years  It has now come to the stage where walking just a few feet going to the restroom makes him tired and SOB  Sometimes associated with chest tightness  Patient denies palpitations, lightheadedness, orthopnea, PND  States he has varicose veins and has leg swelling frequently    Chronic diastolic heart failure - on furosemide 40 mg OD, carvedilol, losartan  States he tries to watch his salt intake    CAD s/p CABG x2 in  (LIMA to LAD, SVG to OM 2 (occluded)); s/p bifurcating stents from ostial LCx to mid LCx and OM1 in ; s/p KATERINE from left main to LCx in Dec 2020; small vessel CAD in distal LAD distribution -on Plavix, statin, carvedilol, losartan, Ranexa  States he used to be on Imdur but somehow it got not refilled at some stage    Moderate to severe aortic stenosis per echocardiogram 2023    Essential hypertension -  Has been hypertensive for many years  Taking medications regularly  Denies lightheadedness, headache, medication side effects  Mixed hyperlipidemia -  Has had hyperlipidemia for many years  Taking statin regularly along with diet control  Denies myalgia  PCP closely monitoring the blood work      GUME - On CPAP therapy    Morbid Obesity      Past Medical History:  Past Medical History:   Diagnosis Date   • Cough    • Sleep apnea, obstructive    • SOB (shortness of breath)    • Wheezing          Past Surgical History:  CABG      Family History:  Family History   Problem Relation Age of Onset   • Heart disease Father          Social History:  Social History     Socioeconomic History   • Marital status: /Civil Union     Spouse name: None   • Number of children: None   • Years of education: None   • Highest education level: None   Occupational History   • Occupation: employed   Tobacco Use   • Smoking status: Never   • Smokeless tobacco: Never   Vaping Use   • Vaping Use: Never used   Substance and Sexual Activity   • Alcohol use: Never   • Drug use: Never   • Sexual activity: None   Other Topics Concern   • None   Social History Narrative   • None     Social Determinants of Health     Financial Resource Strain: Not on file   Food Insecurity: Not on file   Transportation Needs: Not on file   Physical Activity: Not on file   Stress: No Stress Concern Present   • Feeling of Stress :  Only a little   Social Connections: Not on file   Intimate Partner Violence: Not on file   Housing Stability: Not on file         Active Problems:  Patient Active Problem List   Diagnosis   • Pulmonary infarct [facility])   • Coronary artery disease of native artery of native heart with stable angina pectoris ([facility])   • Chronic diastolic congestive heart failure ([facility])   • Varicose veins of right lower extremity   • Renal lesion   • Obesity hypoventilation syndrome ([facility])   • Morbid obesity ([facility])   • Nonrheumatic aortic valve stenosis   • Obstructive sleep apnea   • Primary osteoarthritis of left knee   • Pure hypercholesterolemia   • S/P angioplasty with stent   • Seasonal allergic rhinitis due to pollen   • Venous insufficiency   • Impaired fasting glucose   • Hx of CABG   • Edema   • Depressive disorder   • Cor pulmonale, chronic ([facility])   • Chronic pain of both shoulders   • Bradycardia   • Nonrheumatic mitral valve regurgitation         The following portions of the patient's history were reviewed and updated as appropriate: past medical history, past surgical history, past family history,  past social history, current medications, allergies and problem list       Review of Systems:  Constitutional: Denies fever, chills  Eyes: Denies eye redness, eye discharge  ENT: Denies hearing loss, sneezing, nasal discharge, sore throat   Respiratory: Denies cough, expectoration  +shortness of breath  Cardiovascular: Denies chest pain, palpitations  +lower extremity swelling  Gastrointestinal: Denies abdominal pain, nausea, vomiting, diarrhea  Genito-Urinary: Denies dysuria, incontinence  Musculoskeletal: Denies back pain, joint pain, muscle pain  Neurologic: Denies lightheadedness, syncope, headache, seizures  Endocrine: Denies polydipsia, temperature intolerance  Allergy and Immunology: Denies hives, insect bite sensitivity  Hematological and Lymphatic: Denies bleeding problems, swollen glands   Psychological: Denies depression, suicidal ideation, anxiety, panic  Dermatological: Denies pruritus, rash, skin lesion changes      Vitals:  Vitals:    01/20/23 1618   BP: 132/82   Pulse: 71   Resp: 16   SpO2: 97%       Body mass index is 45 19 kg/m²  Weight (last 2 days)     Date/Time Weight    01/20/23 1618 139 (306)            Physical Examination:  General: Patient is not in acute distress  Awake, alert, oriented in time, place and person  Responding to commands  Morbidly obese  Head: Normocephalic  Atraumatic  Eyes: Both pupils normal sized, round and reactive to light  Nonicteric  ENT: Normal external ear canals  Neck: Supple  JVP not raised  Trachea central  No thyromegaly  Lungs: Bilateral bronchovascular breath sounds with no crackles or rhonchi  Chest wall: No tenderness  Cardiovascular: RRR  S1 and S2 normal  Grade 3/6 JONAH at base+  Gastrointestinal: Abdomen soft, nontender  No guarding or rigidity  Liver and spleen not palpable  Bowel sounds present  Neurologic: Patient is awake, alert, oriented in time, place and person  Responding to commands   Moving all extremities  Integumentary:  No skin rash  Lymphatic: No cervical lymphadenopathy  Back: Symmetric  No CVA tenderness  Extremities: No clubbing, cyanosis  B/L leg edema+      Laboratory Results:  CBC with diff:   Lab Results   Component Value Date    WBC 7 89 01/06/2023    RBC 4 30 01/06/2023    HGB 13 3 01/06/2023    HCT 40 8 01/06/2023    MCV 95 01/06/2023    MCH 30 9 01/06/2023    RDW 15 1 01/06/2023     01/06/2023       CMP:  Lab Results   Component Value Date    CREATININE 0 99 01/06/2023    BUN 21 01/06/2023    K 3 9 01/06/2023     01/06/2023    CO2 26 01/06/2023    ALKPHOS 87 01/06/2023    ALT 49 01/06/2023    AST 27 01/06/2023    BILIDIR 0 30 (H) 09/15/2021       Lab Results   Component Value Date    HGBA1C 5 3 01/06/2023    MG 2 1 09/16/2021       Lab Results   Component Value Date    TROPONINI <0 02 09/12/2021       Cardiac testing:   Results for orders placed during the hospital encounter of 01/11/23    Echo complete w/ contrast if indicated    Interpretation Summary  •  Left Ventricle: Left ventricular cavity size is normal  Wall thickness is normal  The left ventricular ejection fraction is 55%  Systolic function is normal  Wall motion is normal  Diastolic function is normal   •  Aortic Valve: The leaflets are moderately calcified  There is moderate to severe stenosis  Peak gradient 47 mmHg and mean gradient 23 mmHg  No major change compared to previous echocardiogram from April 2022  •  Mitral Valve: There is mild annular calcification  •  Aorta: The ascending aorta is mildly dilated  4 0 cm        Medications:    Current Outpatient Medications:   •  Ascorbic Acid (VITAMIN C PO), Take 100 mg by mouth daily, Disp: , Rfl:   •  atorvastatin (LIPITOR) 40 mg tablet, TAKE 1 TABLET BY MOUTH DAILY WITH DINNER, Disp: 90 tablet, Rfl: 3  •  buPROPion (Wellbutrin XL) 150 mg 24 hr tablet, Take 1 tablet (150 mg total) by mouth every morning, Disp: 30 tablet, Rfl: 5  •  carvedilol (COREG) 25 mg tablet, TAKE 1 TABLET BY MOUTH TWICE A DAY WITH FOOD, Disp: 180 tablet, Rfl: 3  •  clopidogrel (PLAVIX) 75 mg tablet, TAKE 1 TABLET BY MOUTH EVERY DAY, Disp: 90 tablet, Rfl: 3  •  DULoxetine (CYMBALTA) 60 mg delayed release capsule, TAKE 1 CAPSULE BY MOUTH EVERY DAY, Disp: 90 capsule, Rfl: 3  •  furosemide (LASIX) 40 mg tablet, TAKE 1 TABLET BY MOUTH EVERY DAY, Disp: 90 tablet, Rfl: 1  •  gabapentin (NEURONTIN) 400 mg capsule, TAKE 1 CAPSULE BY MOUTH 2 TIMES A DAY , Disp: 180 capsule, Rfl: 3  •  lidocaine (Lidoderm) 5 %, Apply 1 patch topically daily Remove & Discard patch within 12 hours or as directed by MD, Disp: 30 patch, Rfl: 1  •  losartan (COZAAR) 100 MG tablet, TAKE 1 TABLET BY MOUTH EVERY DAY, Disp: 90 tablet, Rfl: 3  •  Omega-3 Fatty Acids (FISH OIL PO), Take 1,000 mg by mouth daily, Disp: , Rfl:   •  ranolazine (RANEXA) 1000 MG SR tablet, TAKE 1 TABLET (1,000 MG TOTAL) BY MOUTH EVERY 12 (TWELVE) HOURS, Disp: 180 tablet, Rfl: 2  •  ZINC GLUCONATE PO, Take 50 mg by mouth daily, Disp: , Rfl:       Allergies: Allergies   Allergen Reactions   • Medical Tape Other (See Comments) and Rash   • Sulfa Antibiotics Other (See Comments)   • Fosinopril Cough     Cough     • Lisinopril Cough   • Wound Dressing Adhesive Dermatitis and Rash       ECG: Reviewed by me   1/20/2023  sinus rhythm  Cannot rule out inferior infarct  ST and T wave abnormality consider lateral ischemia      Assessment and Plan:  1  Shortness of breath on exertion  Due to unstable angina +/- aortic stenosis  Cardiac catheterization recommended  Procedure including risk benefits alternatives discussed  Questions answered  Patient willing  Task sent  2  CAD S/P percutaneous coronary angioplasty S/P CABG  Continue Plavix, statin, beta-blocker, ARB, Ranexa  Add Imdur 30 mg daily  As needed SL NTG discussed and prescribed    3  Chronic diastolic heart failure (HCC)  Continue furosemide, losartan, carvedilol  Low-salt diet  Daily weight    4  Nonrheumatic aortic valve stenosis  To be referred for evaluation for SAVR versus TAVR after cardiac catheterization    5  Essential hypertension  BP stable  Continue current medications  Continue to monitor BP at home and call if abnormal    6  Mixed hyperlipidemia  Continue statin and diet control  His PCP closely monitors his blood work    7  Morbid obesity due to excess calories (HCC)  Try to lose weight    8  GUME (obstructive sleep apnea)  Use CPAP regularly    Recommend aggressive risk factor modification and therapeutic lifestyle changes  Low-salt, low-calorie, low-fat, low-cholesterol diet with regular exercise and to optimize weight  I will defer the ordering and monitoring of necessity lab studies to you, but I am available and happy to review and manage any of the data at your request in the future  Discussed concepts of atherosclerosis, including signs and symptoms of cardiac disease  Previous studies were reviewed  Safety measures were reviewed  Questions were entertained and answered  Patient was advised to report any problems requiring medical attention  Follow-up with PCP and appropriate specialist and lab work as discussed  Return for follow up visit as scheduled or earlier, if needed  Thank you for allowing me to participate in the care and evaluation of your patient  Should you have any questions, please feel free to contact me        Ronell Klinefelter, MD  1/20/2023,4:59 PM

## 2023-01-20 NOTE — H&P (VIEW-ONLY)
[address] [facility]  [address], [place], [facility] Hollie Wilkes  Tel: [phone]      NAME: Otto Okeefe  AGE: 79 y o  SEX: male  : 1955  MRN: [id number]      Chief Complaint:  Chief Complaint   Patient presents with   • New Patient Visit         History of Present Illness:   Ref by Dr Rafael Flores  55-year-old male with an extensive cardiac history as detailed below who states that he has been getting increased shortness of breath for the last few years  It has now come to the stage where walking just a few feet going to the restroom makes him tired and SOB  Sometimes associated with chest tightness  Patient denies palpitations, lightheadedness, orthopnea, PND  States he has varicose veins and has leg swelling frequently    Chronic diastolic heart failure - on furosemide 40 mg OD, carvedilol, losartan  States he tries to watch his salt intake    CAD s/p CABG x2 in  (LIMA to LAD, SVG to OM 2 (occluded)); s/p bifurcating stents from ostial LCx to mid LCx and OM1 in ; s/p KATERINE from left main to LCx in Dec 2020; small vessel CAD in distal LAD distribution -on Plavix, statin, carvedilol, losartan, Ranexa  States he used to be on Imdur but somehow it got not refilled at some stage    Moderate to severe aortic stenosis per echocardiogram 2023    Essential hypertension -  Has been hypertensive for many years  Taking medications regularly  Denies lightheadedness, headache, medication side effects  Mixed hyperlipidemia -  Has had hyperlipidemia for many years  Taking statin regularly along with diet control  Denies myalgia  PCP closely monitoring the blood work      GUME - On CPAP therapy    Morbid Obesity      Past Medical History:  Past Medical History:   Diagnosis Date   • Cough    • Sleep apnea, obstructive    • SOB (shortness of breath)    • Wheezing          Past Surgical History:  CABG      Family History:  Family History   Problem Relation Age of Onset   • Heart disease Father          Social History:  Social History     Socioeconomic History   • Marital status: /Civil Union     Spouse name: None   • Number of children: None   • Years of education: None   • Highest education level: None   Occupational History   • Occupation: employed   Tobacco Use   • Smoking status: Never   • Smokeless tobacco: Never   Vaping Use   • Vaping Use: Never used   Substance and Sexual Activity   • Alcohol use: Never   • Drug use: Never   • Sexual activity: None   Other Topics Concern   • None   Social History Narrative   • None     Social Determinants of Health     Financial Resource Strain: Not on file   Food Insecurity: Not on file   Transportation Needs: Not on file   Physical Activity: Not on file   Stress: No Stress Concern Present   • Feeling of Stress :  Only a little   Social Connections: Not on file   Intimate Partner Violence: Not on file   Housing Stability: Not on file         Active Problems:  Patient Active Problem List   Diagnosis   • Pulmonary infarct [facility])   • Coronary artery disease of native artery of native heart with stable angina pectoris ([facility])   • Chronic diastolic congestive heart failure ([facility])   • Varicose veins of right lower extremity   • Renal lesion   • Obesity hypoventilation syndrome ([facility])   • Morbid obesity ([facility])   • Nonrheumatic aortic valve stenosis   • Obstructive sleep apnea   • Primary osteoarthritis of left knee   • Pure hypercholesterolemia   • S/P angioplasty with stent   • Seasonal allergic rhinitis due to pollen   • Venous insufficiency   • Impaired fasting glucose   • Hx of CABG   • Edema   • Depressive disorder   • Cor pulmonale, chronic ([facility])   • Chronic pain of both shoulders   • Bradycardia   • Nonrheumatic mitral valve regurgitation         The following portions of the patient's history were reviewed and updated as appropriate: past medical history, past surgical history, past family history,  past social history, current medications, allergies and problem list       Review of Systems:  Constitutional: Denies fever, chills  Eyes: Denies eye redness, eye discharge  ENT: Denies hearing loss, sneezing, nasal discharge, sore throat   Respiratory: Denies cough, expectoration  +shortness of breath  Cardiovascular: Denies chest pain, palpitations  +lower extremity swelling  Gastrointestinal: Denies abdominal pain, nausea, vomiting, diarrhea  Genito-Urinary: Denies dysuria, incontinence  Musculoskeletal: Denies back pain, joint pain, muscle pain  Neurologic: Denies lightheadedness, syncope, headache, seizures  Endocrine: Denies polydipsia, temperature intolerance  Allergy and Immunology: Denies hives, insect bite sensitivity  Hematological and Lymphatic: Denies bleeding problems, swollen glands   Psychological: Denies depression, suicidal ideation, anxiety, panic  Dermatological: Denies pruritus, rash, skin lesion changes      Vitals:  Vitals:    01/20/23 1618   BP: 132/82   Pulse: 71   Resp: 16   SpO2: 97%       Body mass index is 45 19 kg/m²  Weight (last 2 days)     Date/Time Weight    01/20/23 1618 139 (306)            Physical Examination:  General: Patient is not in acute distress  Awake, alert, oriented in time, place and person  Responding to commands  Morbidly obese  Head: Normocephalic  Atraumatic  Eyes: Both pupils normal sized, round and reactive to light  Nonicteric  ENT: Normal external ear canals  Neck: Supple  JVP not raised  Trachea central  No thyromegaly  Lungs: Bilateral bronchovascular breath sounds with no crackles or rhonchi  Chest wall: No tenderness  Cardiovascular: RRR  S1 and S2 normal  Grade 3/6 JONAH at base+  Gastrointestinal: Abdomen soft, nontender  No guarding or rigidity  Liver and spleen not palpable  Bowel sounds present  Neurologic: Patient is awake, alert, oriented in time, place and person  Responding to commands   Moving all extremities  Integumentary:  No skin rash  Lymphatic: No cervical lymphadenopathy  Back: Symmetric  No CVA tenderness  Extremities: No clubbing, cyanosis  B/L leg edema+      Laboratory Results:  CBC with diff:   Lab Results   Component Value Date    WBC 7 89 01/06/2023    RBC 4 30 01/06/2023    HGB 13 3 01/06/2023    HCT 40 8 01/06/2023    MCV 95 01/06/2023    MCH 30 9 01/06/2023    RDW 15 1 01/06/2023     01/06/2023       CMP:  Lab Results   Component Value Date    CREATININE 0 99 01/06/2023    BUN 21 01/06/2023    K 3 9 01/06/2023     01/06/2023    CO2 26 01/06/2023    ALKPHOS 87 01/06/2023    ALT 49 01/06/2023    AST 27 01/06/2023    BILIDIR 0 30 (H) 09/15/2021       Lab Results   Component Value Date    HGBA1C 5 3 01/06/2023    MG 2 1 09/16/2021       Lab Results   Component Value Date    TROPONINI <0 02 09/12/2021       Cardiac testing:   Results for orders placed during the hospital encounter of 01/11/23    Echo complete w/ contrast if indicated    Interpretation Summary  •  Left Ventricle: Left ventricular cavity size is normal  Wall thickness is normal  The left ventricular ejection fraction is 55%  Systolic function is normal  Wall motion is normal  Diastolic function is normal   •  Aortic Valve: The leaflets are moderately calcified  There is moderate to severe stenosis  Peak gradient 47 mmHg and mean gradient 23 mmHg  No major change compared to previous echocardiogram from April 2022  •  Mitral Valve: There is mild annular calcification  •  Aorta: The ascending aorta is mildly dilated  4 0 cm        Medications:    Current Outpatient Medications:   •  Ascorbic Acid (VITAMIN C PO), Take 100 mg by mouth daily, Disp: , Rfl:   •  atorvastatin (LIPITOR) 40 mg tablet, TAKE 1 TABLET BY MOUTH DAILY WITH DINNER, Disp: 90 tablet, Rfl: 3  •  buPROPion (Wellbutrin XL) 150 mg 24 hr tablet, Take 1 tablet (150 mg total) by mouth every morning, Disp: 30 tablet, Rfl: 5  •  carvedilol (COREG) 25 mg tablet, TAKE 1 TABLET BY MOUTH TWICE A DAY WITH FOOD, Disp: 180 tablet, Rfl: 3  •  clopidogrel (PLAVIX) 75 mg tablet, TAKE 1 TABLET BY MOUTH EVERY DAY, Disp: 90 tablet, Rfl: 3  •  DULoxetine (CYMBALTA) 60 mg delayed release capsule, TAKE 1 CAPSULE BY MOUTH EVERY DAY, Disp: 90 capsule, Rfl: 3  •  furosemide (LASIX) 40 mg tablet, TAKE 1 TABLET BY MOUTH EVERY DAY, Disp: 90 tablet, Rfl: 1  •  gabapentin (NEURONTIN) 400 mg capsule, TAKE 1 CAPSULE BY MOUTH 2 TIMES A DAY , Disp: 180 capsule, Rfl: 3  •  lidocaine (Lidoderm) 5 %, Apply 1 patch topically daily Remove & Discard patch within 12 hours or as directed by MD, Disp: 30 patch, Rfl: 1  •  losartan (COZAAR) 100 MG tablet, TAKE 1 TABLET BY MOUTH EVERY DAY, Disp: 90 tablet, Rfl: 3  •  Omega-3 Fatty Acids (FISH OIL PO), Take 1,000 mg by mouth daily, Disp: , Rfl:   •  ranolazine (RANEXA) 1000 MG SR tablet, TAKE 1 TABLET (1,000 MG TOTAL) BY MOUTH EVERY 12 (TWELVE) HOURS, Disp: 180 tablet, Rfl: 2  •  ZINC GLUCONATE PO, Take 50 mg by mouth daily, Disp: , Rfl:       Allergies: Allergies   Allergen Reactions   • Medical Tape Other (See Comments) and Rash   • Sulfa Antibiotics Other (See Comments)   • Fosinopril Cough     Cough     • Lisinopril Cough   • Wound Dressing Adhesive Dermatitis and Rash       ECG: Reviewed by me   1/20/2023  sinus rhythm  Cannot rule out inferior infarct  ST and T wave abnormality consider lateral ischemia      Assessment and Plan:  1  Shortness of breath on exertion  Due to unstable angina +/- aortic stenosis  Cardiac catheterization recommended  Procedure including risk benefits alternatives discussed  Questions answered  Patient willing  Task sent  2  CAD S/P percutaneous coronary angioplasty S/P CABG  Continue Plavix, statin, beta-blocker, ARB, Ranexa  Add Imdur 30 mg daily  As needed SL NTG discussed and prescribed    3  Chronic diastolic heart failure (HCC)  Continue furosemide, losartan, carvedilol  Low-salt diet  Daily weight    4  Nonrheumatic aortic valve stenosis  To be referred for evaluation for SAVR versus TAVR after cardiac catheterization    5  Essential hypertension  BP stable  Continue current medications  Continue to monitor BP at home and call if abnormal    6  Mixed hyperlipidemia  Continue statin and diet control  His PCP closely monitors his blood work    7  Morbid obesity due to excess calories (HCC)  Try to lose weight    8  GUME (obstructive sleep apnea)  Use CPAP regularly    Recommend aggressive risk factor modification and therapeutic lifestyle changes  Low-salt, low-calorie, low-fat, low-cholesterol diet with regular exercise and to optimize weight  I will defer the ordering and monitoring of necessity lab studies to you, but I am available and happy to review and manage any of the data at your request in the future  Discussed concepts of atherosclerosis, including signs and symptoms of cardiac disease  Previous studies were reviewed  Safety measures were reviewed  Questions were entertained and answered  Patient was advised to report any problems requiring medical attention  Follow-up with PCP and appropriate specialist and lab work as discussed  Return for follow up visit as scheduled or earlier, if needed  Thank you for allowing me to participate in the care and evaluation of your patient  Should you have any questions, please feel free to contact me        Stephan Tamayo MD  1/20/2023,4:59 PM

## 2023-01-23 ENCOUNTER — TELEPHONE (OUTPATIENT)
Dept: CARDIOLOGY CLINIC | Facility: CLINIC | Age: 68
End: 2023-01-23

## 2023-01-23 ENCOUNTER — PREP FOR PROCEDURE (OUTPATIENT)
Dept: CARDIOLOGY CLINIC | Facility: CLINIC | Age: 68
End: 2023-01-23

## 2023-01-23 DIAGNOSIS — Z01.818 PRE-OP EXAM: Primary | ICD-10-CM

## 2023-01-23 DIAGNOSIS — I35.0 NONRHEUMATIC AORTIC VALVE STENOSIS: ICD-10-CM

## 2023-01-23 DIAGNOSIS — I20.0 UNSTABLE ANGINA (HCC): ICD-10-CM

## 2023-01-23 DIAGNOSIS — Z98.61 CAD S/P PERCUTANEOUS CORONARY ANGIOPLASTY: ICD-10-CM

## 2023-01-23 DIAGNOSIS — Z98.61 CAD S/P PERCUTANEOUS CORONARY ANGIOPLASTY: Primary | ICD-10-CM

## 2023-01-23 DIAGNOSIS — I25.10 CAD S/P PERCUTANEOUS CORONARY ANGIOPLASTY: Primary | ICD-10-CM

## 2023-01-23 DIAGNOSIS — I25.10 CAD S/P PERCUTANEOUS CORONARY ANGIOPLASTY: ICD-10-CM

## 2023-01-23 NOTE — TELEPHONE ENCOUNTER
Pt aware no labs needed  Pt aware of meds hold   Hold lasix and losartan the morning of the procedure

## 2023-01-23 NOTE — TELEPHONE ENCOUNTER
Can we get a auth for [facility] on 1/27/23, thanks

## 2023-01-23 NOTE — TELEPHONE ENCOUNTER
----- Message from Martin Patel MD sent at 1/20/2023  5:28 PM EST -----  Patient needs cardiac catheterization for unstable angina and aortic stenosis

## 2023-01-25 ENCOUNTER — TELEPHONE (OUTPATIENT)
Dept: OBGYN CLINIC | Facility: HOSPITAL | Age: 68
End: 2023-01-25

## 2023-01-25 NOTE — TELEPHONE ENCOUNTER
Caller: Carlos Alberto    Doctor: Joan Marte    Reason for call: Patient calling to check status of message  Good Day,  will it be possible to have a referral sent out to the following Provider  [address] [place] [address], [place] [address]  Phone [phone]  Fax  [phone]  this will be for a nerve block in both knees due to the joint deterioration  if at all possible please fax this referral to the following [phone] as i do not have access to a fax machine       Thank you very much  Kalyn Tellez      Call back#: [phone]

## 2023-01-26 DIAGNOSIS — M17.0 PRIMARY OSTEOARTHRITIS OF BOTH KNEES: Primary | ICD-10-CM

## 2023-01-27 ENCOUNTER — DOCUMENTATION (OUTPATIENT)
Dept: CARDIOLOGY CLINIC | Facility: CLINIC | Age: 68
End: 2023-01-27

## 2023-01-27 ENCOUNTER — HOSPITAL ENCOUNTER (OUTPATIENT)
Facility: HOSPITAL | Age: 68
Setting detail: OUTPATIENT SURGERY
Discharge: HOME | End: 2023-01-27
Attending: INTERNAL MEDICINE | Admitting: INTERNAL MEDICINE

## 2023-01-27 VITALS
OXYGEN SATURATION: 94 % | HEART RATE: 63 BPM | DIASTOLIC BLOOD PRESSURE: 81 MMHG | WEIGHT: 308.2 LBS | SYSTOLIC BLOOD PRESSURE: 157 MMHG | BODY MASS INDEX: 45.65 KG/M2 | TEMPERATURE: 97.7 F | HEIGHT: 69 IN | RESPIRATION RATE: 16 BRPM

## 2023-01-27 DIAGNOSIS — I50.32 CHRONIC DIASTOLIC CONGESTIVE HEART FAILURE (HCC): ICD-10-CM

## 2023-01-27 DIAGNOSIS — I20.0 UNSTABLE ANGINA (HCC): ICD-10-CM

## 2023-01-27 DIAGNOSIS — I35.0 NONRHEUMATIC AORTIC VALVE STENOSIS: ICD-10-CM

## 2023-01-27 DIAGNOSIS — Z98.61 CAD S/P PERCUTANEOUS CORONARY ANGIOPLASTY: ICD-10-CM

## 2023-01-27 DIAGNOSIS — I25.10 CAD S/P PERCUTANEOUS CORONARY ANGIOPLASTY: ICD-10-CM

## 2023-01-27 LAB
KCT BLD-ACNC: 114 SEC (ref 89–137)
SPECIMEN SOURCE: NORMAL

## 2023-01-27 RX ORDER — FUROSEMIDE 40 MG/1
40 TABLET ORAL DAILY
Qty: 90 TABLET | Refills: 0 | Status: SHIPPED | OUTPATIENT
Start: 2023-01-28

## 2023-01-27 RX ORDER — NITROGLYCERIN 20 MG/100ML
INJECTION INTRAVENOUS CODE/TRAUMA/SEDATION MEDICATION
Status: DISCONTINUED | OUTPATIENT
Start: 2023-01-27 | End: 2023-01-27 | Stop reason: HOSPADM

## 2023-01-27 RX ORDER — LIDOCAINE HYDROCHLORIDE 10 MG/ML
INJECTION, SOLUTION EPIDURAL; INFILTRATION; INTRACAUDAL; PERINEURAL CODE/TRAUMA/SEDATION MEDICATION
Status: DISCONTINUED | OUTPATIENT
Start: 2023-01-27 | End: 2023-01-27 | Stop reason: HOSPADM

## 2023-01-27 RX ORDER — MIDAZOLAM HYDROCHLORIDE 2 MG/2ML
INJECTION, SOLUTION INTRAMUSCULAR; INTRAVENOUS CODE/TRAUMA/SEDATION MEDICATION
Status: DISCONTINUED | OUTPATIENT
Start: 2023-01-27 | End: 2023-01-27 | Stop reason: HOSPADM

## 2023-01-27 RX ORDER — CLOPIDOGREL BISULFATE 75 MG/1
TABLET ORAL CODE/TRAUMA/SEDATION MEDICATION
Status: DISCONTINUED | OUTPATIENT
Start: 2023-01-27 | End: 2023-01-27 | Stop reason: HOSPADM

## 2023-01-27 RX ORDER — HEPARIN SODIUM 1000 [USP'U]/ML
INJECTION, SOLUTION INTRAVENOUS; SUBCUTANEOUS CODE/TRAUMA/SEDATION MEDICATION
Status: DISCONTINUED | OUTPATIENT
Start: 2023-01-27 | End: 2023-01-27 | Stop reason: HOSPADM

## 2023-01-27 RX ORDER — ACETAMINOPHEN 325 MG/1
TABLET ORAL
Status: DISCONTINUED
Start: 2023-01-27 | End: 2023-01-27 | Stop reason: HOSPADM

## 2023-01-27 RX ORDER — FENTANYL CITRATE 50 UG/ML
INJECTION, SOLUTION INTRAMUSCULAR; INTRAVENOUS CODE/TRAUMA/SEDATION MEDICATION
Status: DISCONTINUED | OUTPATIENT
Start: 2023-01-27 | End: 2023-01-27 | Stop reason: HOSPADM

## 2023-01-27 RX ORDER — HYDRALAZINE HYDROCHLORIDE 20 MG/ML
INJECTION INTRAMUSCULAR; INTRAVENOUS CODE/TRAUMA/SEDATION MEDICATION
Status: DISCONTINUED | OUTPATIENT
Start: 2023-01-27 | End: 2023-01-27 | Stop reason: HOSPADM

## 2023-01-27 RX ORDER — SODIUM CHLORIDE 9 MG/ML
75 INJECTION, SOLUTION INTRAVENOUS CONTINUOUS
Status: DISPENSED | OUTPATIENT
Start: 2023-01-27 | End: 2023-01-27

## 2023-01-27 RX ORDER — IBUPROFEN 400 MG/1
800 TABLET ORAL ONCE
Status: COMPLETED | OUTPATIENT
Start: 2023-01-27 | End: 2023-01-27

## 2023-01-27 RX ORDER — ACETAMINOPHEN 325 MG/1
650 TABLET ORAL ONCE
Status: COMPLETED | OUTPATIENT
Start: 2023-01-27 | End: 2023-01-27

## 2023-01-27 RX ADMIN — ACETAMINOPHEN 650 MG: 325 TABLET ORAL at 12:54

## 2023-01-27 RX ADMIN — MORPHINE SULFATE 1 MG: 2 INJECTION, SOLUTION INTRAMUSCULAR; INTRAVENOUS at 14:42

## 2023-01-27 RX ADMIN — IBUPROFEN 800 MG: 400 TABLET ORAL at 12:27

## 2023-01-27 RX ADMIN — SODIUM CHLORIDE 75 ML/HR: 0.9 INJECTION, SOLUTION INTRAVENOUS at 11:15

## 2023-01-27 RX ADMIN — MORPHINE SULFATE 2 MG: 2 INJECTION, SOLUTION INTRAMUSCULAR; INTRAVENOUS at 11:04

## 2023-01-27 RX ADMIN — ACETAMINOPHEN 650 MG: 325 TABLET, FILM COATED ORAL at 11:10

## 2023-01-27 NOTE — QUICK NOTE
Called by nursing staff, patient wants to leave [address] immediately after cardiac catheterization  Patient had cardiac catheterization via right groin access and left radial access  He was seen by Dr Hadley Tirado  Patient also complaining of right and left shoulder joint pain  He gets similar joint pains at home and takes tylenol and ibuprofen  No CP or back pain  No other complaints  Explained to patient regarding risks associated with leaving [address]  Family at bedside  Patient agreed to stay

## 2023-01-27 NOTE — INTERVAL H&P NOTE
Update: (This section must be completed if the H&P was completed greater than 24 hrs to procedure or admission)    H&P reviewed  After examining the patient, I find no changed to the H&P since it had been written  I have discussed in detail with patient regarding the indications, alternatives, risks and benefit of cardiac catheterization and possible PCI  The procedure risks, benefits, and complications (including but not limited to bleeding, infection, arrhythmia, nephrotoxicity, vessel injury, myocardial infarction, CVA, and death) were reviewed  Patient is alert and oriented x3 and wishes to proceed  All questions answered      Patient re-evaluated   Accept as history and physical     Bambi Chew MD/January 27, 2023/8:11 AM

## 2023-01-27 NOTE — DISCHARGE INSTRUCTIONS
After Radial Heart Catheterization   WHAT YOU NEED TO KNOW:   What will happen after a radial heart catheterization? You will be attached to a heart monitor until you are fully awake  A heart monitor is an EKG that stays on continuously to record your heart's electrical activity  Healthcare providers will monitor your vital signs and pulses in your arm  They will frequently check your pressure bandage for bleeding or swelling  You may have a band wrapped tightly around your wrist  The band puts pressure on your wound and helps prevent bleeding  A healthcare provider can put air into the band or remove air from the band  A healthcare provider will gradually remove air from the band and decrease pressure on your wrist  The band may be removed in 2 hours or when your wound stops bleeding  You will need to keep your wrist straight for 2 to 4 hours  Do not  push or pull with your arm  Arm movements can cause serious bleeding  After you are monitored for several hours, you may go home or may need to stay in the hospital overnight  What do I need to know before I go home? Care for your wound as directed  Remove the pressure bandage in 24 hours or as directed  Mild bruising is normal and expected  A small bandage can be placed on your wound after you remove the pressure bandage  Do not put powders, lotions, or creams on your wound  They may cause your wound to get infected  Monitor your wound every day for signs of infection, such as redness, swelling, or pus  Shower the day after your procedure or as directed  Remove your pressure bandage before you shower  Do not take baths or go in hot tubs or pools  Carefully wash the wound with soap and water  Pat the area dry  A small bandage can be placed on your wound after you shower  Apply firm, steady pressure to your wound if it bleeds  Apply pressure with a clean gauze or towel for 5 to 10 minutes  Call 911 if bleeding becomes heavy or does not stop  Drink liquids as directed  Liquids will help flush the contrast liquid from your body  Ask how much liquid to drink each day and which liquids are best for you  Do not lift anything heavier than 5 pounds until directed by your healthcare provider  Heavy lifting can put stress on your wound and cause bleeding  Do not push or pull with the arm that was used for the procedure  Do not do vigorous activity for at least 48 hours  Vigorous activity may cause bleeding from your wound  Rest and do quiet activities  Take short walks around the house to prevent a blood clot  Ask your healthcare provider when you can return to your normal activities  Do not drive or return to work until your healthcare provider says it is okay  Your healthcare provider may tell you to wait 48 hours before you drive to decrease your risk for bleeding  You may not be able to return to work for at least 2 days after your procedure if your job involves heavy lifting  What medicines may I need? You may need any of the following:  Blood thinners  help prevent blood clots  Clots can cause strokes, heart attacks, and death  The following are general safety guidelines to follow while you are taking a blood thinner:    Watch for bleeding and bruising while you take blood thinners  Watch for bleeding from your gums or nose  Watch for blood in your urine and bowel movements  Use a soft washcloth on your skin, and a soft toothbrush to brush your teeth  This can keep your skin and gums from bleeding  If you shave, use an electric shaver  Do not play contact sports  Tell your dentist and other healthcare providers that you take a blood thinner  Wear a bracelet or necklace that says you take this medicine  Do not start or stop any other medicines unless your healthcare provider tells you to  Many medicines cannot be used with blood thinners  Take your blood thinner exactly as prescribed by your healthcare provider   Do not skip does or take less than prescribed  Tell your provider right away if you forget to take your blood thinner, or if you take too much  Warfarin  is a blood thinner that you may need to take  The following are things you should be aware of if you take warfarin:     Foods and medicines can affect the amount of warfarin in your blood  Do not make major changes to your diet while you take warfarin  Warfarin works best when you eat about the same amount of vitamin K every day  Vitamin K is found in green leafy vegetables and certain other foods  Ask for more information about what to eat when you are taking warfarin  You will need to see your healthcare provider for follow-up visits when you are on warfarin  You will need regular blood tests  These tests are used to decide how much medicine you need  Acetaminophen  helps decrease pain and fever  This medicine is available without a doctor's order  Ask how much medicine is safe to take, and how often to take it  Acetaminophen can cause liver damage if not taken correctly  Take your medicine as directed  Contact your healthcare provider if you think your medicine is not helping or if you have side effects  Tell him or her if you are allergic to any medicine  Keep a list of the medicines, vitamins, and herbs you take  Include the amounts, and when and why you take them  Bring the list or the pill bottles to follow-up visits  Carry your medicine list with you in case of an emergency  Call your local emergency number (911 in the [address] [facility]) if:   You have chest pain       You have any of the following signs of a heart attack:      Squeezing, pressure, or pain in your chest    You may  also have any of the following:     Discomfort or pain in your back, neck, jaw, stomach, or arm    Shortness of breath    Nausea or vomiting    Lightheadedness or a sudden cold sweat    You have any of the following signs of a stroke:      Numbness or drooping on one side of your face Weakness in an arm or leg    Confusion or difficulty speaking    Dizziness, a severe headache, or vision loss    You cough up blood  You have trouble breathing  You cannot stop the bleeding from your wound even after you hold firm pressure for 10 minutes  When should I call my doctor? You have a fever or chills  Blood soaks through your bandage  Your stitches come apart  Your hand or arm feels numb, cool, or looks pale  Your wound gets swollen quickly  Your wound is red, swollen, or draining pus  Your wound looks more bruised or you have new bruising on the side of your wrist      You have nausea or are vomiting  Your skin is itchy, swollen, or you have a rash  You have questions or concerns about your condition or care  Healthy living tips: The following are general healthy guidelines  If your chest pain is caused by a heart problem, your healthcare provider will give you specific guidelines to follow  Manage other health conditions  Diabetes and high cholesterol increases your risk for another heart attack and stroke  Talk to your healthcare provider about your management plan  He or she will make a plan that helps you manage your conditions  Do not smoke  Nicotine and other chemicals in cigarettes and cigars can cause lung and heart damage  Ask your healthcare provider for information if you currently smoke and need help to quit  E-cigarettes or smokeless tobacco still contain nicotine  Talk to your healthcare provider before you use these products  Eat a variety of healthy, low-fat, low-salt foods  Healthy foods include fruits, vegetables, whole-grain breads, low-fat dairy products, beans, lean meats, and fish  Ask for more information about a heart healthy diet  Drink plenty of water every day  Your body is made of mostly water  Water helps your body to control your temperature and blood pressure   Ask your healthcare provider how much water you should drink every day  Ask about activity  Your healthcare provider will tell you which activities to limit or avoid  Ask when you can drive, return to work, and have sex  Ask about the best exercise plan for you  Maintain a healthy weight  Ask your healthcare provider how much you should weigh  Ask him or her to help you create a weight loss plan if you are overweight  Get the flu and pneumonia vaccines  All adults should get the influenza (flu) vaccine  Get it every year as soon as it becomes available  The pneumococcal vaccine is given to adults aged 72 years or older  The vaccine is given every 5 years to prevent pneumococcal disease, such as pneumonia  If you have a stent:   Carry your stent card with you at all times  Let all healthcare providers know that you have a stent  CARE AGREEMENT:   You have the right to help plan your care  Learn about your health condition and how it may be treated  Discuss treatment options with your healthcare providers to decide what care you want to receive  You always have the right to refuse treatment  The above information is an  only  It is not intended as medical advice for individual conditions or treatments  Talk to your doctor, nurse or pharmacist before following any medical regimen to see if it is safe and effective for you  © Copyright [employer] 2022 Information is for End User's use only and may not be sold, redistributed or otherwise used for commercial purposes   All illustrations and images included in CareNotes® are the copyrighted property of A D A M , Inc  or [address] [place] [address] [employer]

## 2023-01-27 NOTE — PROGRESS NOTES
Patient seen and examined multiple times following cardiac catheterization  Final cardiac catheterization discussed in detail with patient and family  Doing well  Denies chest pain, back pain, chest pressure, shortness of breath  Maribel Thompson he has right and left shoulder pain which is more on movement  States he has had this pain almost every day  Related to his arthritis  Patient given ibuprofen and medications with improvement  Groin appears stable  Within normal limits  No edema, swelling, hematoma or erythema  Patient stable for discharge  Discussed post discharge instructions with patient and wife    Signs and symptoms to look out for which may need further evaluation were discussed in detail

## 2023-01-30 ENCOUNTER — OFFICE VISIT (OUTPATIENT)
Dept: OBGYN CLINIC | Facility: CLINIC | Age: 68
End: 2023-01-30

## 2023-01-30 ENCOUNTER — APPOINTMENT (OUTPATIENT)
Dept: RADIOLOGY | Facility: CLINIC | Age: 68
End: 2023-01-30

## 2023-01-30 VITALS
WEIGHT: 308 LBS | SYSTOLIC BLOOD PRESSURE: 158 MMHG | HEIGHT: 69 IN | BODY MASS INDEX: 45.62 KG/M2 | HEART RATE: 51 BPM | DIASTOLIC BLOOD PRESSURE: 85 MMHG

## 2023-01-30 DIAGNOSIS — M25.561 CHRONIC PAIN OF RIGHT KNEE: ICD-10-CM

## 2023-01-30 DIAGNOSIS — G89.29 CHRONIC PAIN OF LEFT KNEE: ICD-10-CM

## 2023-01-30 DIAGNOSIS — M25.562 CHRONIC PAIN OF LEFT KNEE: ICD-10-CM

## 2023-01-30 DIAGNOSIS — M17.0 PRIMARY OSTEOARTHRITIS OF BOTH KNEES: Primary | ICD-10-CM

## 2023-01-30 DIAGNOSIS — G89.29 CHRONIC PAIN OF RIGHT KNEE: ICD-10-CM

## 2023-01-30 DIAGNOSIS — M17.0 PRIMARY OSTEOARTHRITIS OF BOTH KNEES: ICD-10-CM

## 2023-01-30 NOTE — PROGRESS NOTES
Patient Name:  Yojana Friday  MRN:  [id number]    [address] [place] [address]     1  Primary osteoarthritis of both knees  -     Ambulatory Referral to Orthopedic Surgery  -     Ambulatory Referral to Pain Management; Future  -     XR tibia fibula 2 vw left; Future; Expected date: 01/30/2023  -     XR tibia fibula 2 vw right; Future; Expected date: 01/30/2023  -     Ambulatory Referral to Orthopedic Surgery; Future    2  Chronic pain of right knee  -     Ambulatory Referral to Pain Management; Future    3  Chronic pain of left knee  -     Ambulatory Referral to Pain Management; Future        79 y o  male with Bilateral knee osteoarthritis with chronic worsening pain and significant varus deformity, s/p bilateral Durolane injections on 10/24/2022  X-rays of bilateral knees and tib-fib reviewed in office today with patient  In depth discussion had with patient regarding continued nonoperative management of Left  knee osteoarthritis including OTC oral analgesics, topical analgesics, formal outpatient physical therapy for strengthening of quads, hamstrings, hip abductors, ice application, Medial  bracing, corticosteroid, viscosupplementation injections  If nonoperative treatment performed with persistent symptoms, also discussed surgical intervention by means of total knee arthroplasty  Risks of surgical intervention discussed with patient including but not limited to infection, fracture, need for additional procedures, injury to surrounding structures, wound healing complications, DVT, PE  Patient was advised he would likely require cardiology and PCP pre op risk stratification  Discussed BMI requirements <40  to move forward with total knee arthroplasty  Patient verbalized understanding of the above and would like to proceed forward with appointment with pain management and consideration of geniculate block  Advised patient to discuss procedure, duration of effects with pain management   At this this time, he would like to hold off on injections as these only provide 2 weeks of pain relief  Educated patient on diet and weight loss modalities, as well  Also placed a referral for Dr Al Shelley to discuss surgical intervention secondary to patient's degree of deformity  He will follow up in office on an as needed basis  Chief Complaint     Bilateral knee pain    History of the Present Illness     Otto Hernandes is a 79 y o  male with Bilateral knee pain ongoing for many years  Patient has history of corticosteroid injections by Dr Lashaun Collins which provided a month of pain relief  Patient recently did seek out medical care with Dr Avery Deluca whom provided corticosteroid and visco injections with minimal relief  He has used a brace in the past which has "kind of helped" with his pain  He admits to history of lower extremity edema which is difficult to use the brace  Today, patient reports the Right is more painful than the Left most of the time  He ambulates with 4 point cane at baseline  He recently had diagnostic cardiac catheterization on 01/27/2023 without stent placement  He states the "walk from here to x-ray is the most I can do"  Dr Avery Deluca also discussed geniculate nerve block, but patient has not yet heard back from [facility] regarding appointment at this time  Review of Systems     Review of Systems   Constitutional: Negative for chills and fever  HENT: Negative for ear pain and sore throat  Eyes: Negative for pain and visual disturbance  Respiratory: Negative for cough and shortness of breath  Cardiovascular: Negative for chest pain and palpitations  Gastrointestinal: Negative for abdominal pain and vomiting  Genitourinary: Negative for dysuria and hematuria  Musculoskeletal: Negative for arthralgias and back pain  Skin: Negative for color change and rash  Neurological: Negative for seizures and syncope  All other systems reviewed and are negative        Physical Exam     /85 (BP Location: Left arm) Pulse (!) 51   Ht 5' 9" (1 753 m)   Wt (!) 140 kg (308 lb)   BMI 45 48 kg/m²     Right Knee  Range of motion from 20 to 95 degrees  Varus alignment partially correctable, correctable with flexion    Varus bow at tibia  There is crepitus with range of motion  There is trace effusion  There is 4+/5 quadriceps strength and preserved tone  The patient is able to perform a straight leg raise  The patient is neurovascular intact distally  Left Knee  Range of motion from 10 to 115 degrees  Varus alignment partially correctable  There is crepitus with range of motion  There is small effusion  There is 5/5 quadriceps strength and preserved tone  The patient is able to perform a straight leg raise  The patient is neurovascular intact distally  Eyes:  Anicteric sclerae  Neck:  Supple  Lungs:  Normal respiratory effort  Cardiovascular:  Capillary refill is less than 2 seconds  Skin:  Intact without erythema  Neurologic:  Sensation grossly intact to light touch  Psychiatric:  Mood and affect are appropriate  Data Review     I have personally reviewed pertinent films in PACS, and my interpretation follows:    X-rays taken 01/10/2023 of Right knee demonstrates severe, end-stage osteoarthritis most noted at medial compartment with joint space narrowing and significant varus deformity  Moderate to severe patellofemoral compartment space narrowing  Osteophytes noted  X-rays taken 01/10/2023 of Left knee demonstrates severe, end-stage osteoarthritis most noted and medial compartment with joint space narrowing and significant varus deformity  Moderate patellofemoral degenerative changes  Osteophytes noted  X-rays taken 01/30/2023 of bilateral tib-fib independently reviewed and demonstrate redemonstration of osteoarthritis with genu vara without significant tibial varus noted       Past Medical History:   Diagnosis Date   • Cough    • HTN (hypertension)    • Hyperlipidemia • Sleep apnea, obstructive    • SOB (shortness of breath)    • Wheezing        Past Surgical History:   Procedure Laterality Date   • CARDIAC CATHETERIZATION Left 1/27/2023    Procedure: Cardiac Left Heart Cath;  Surgeon: Jalen Pleitez MD;  Location: [address] [place] [address] CATH LAB; Service: Cardiology   • CARDIAC CATHETERIZATION  1/27/2023    Procedure: Cardiac catheterization;  Surgeon: Jalen Pleitez MD;  Location: [address] [place] [address] CATH LAB; Service: Cardiology   • CORONARY ANGIOPLASTY WITH STENT PLACEMENT N/A    • CORONARY ARTERY BYPASS GRAFT      12 years ago   • KNEE CARTILAGE SURGERY Left     left knee meniscus repair       Allergies   Allergen Reactions   • Medical Tape Other (See Comments) and Rash   • Sulfa Antibiotics Other (See Comments)   • Fosinopril Cough     Cough     • Lisinopril Cough   • Wound Dressing Adhesive Dermatitis and Rash       Current Outpatient Medications on File Prior to Visit   Medication Sig Dispense Refill   • Ascorbic Acid (VITAMIN C PO) Take 100 mg by mouth daily     • atorvastatin (LIPITOR) 40 mg tablet TAKE 1 TABLET BY MOUTH DAILY WITH DINNER 90 tablet 3   • buPROPion (Wellbutrin XL) 150 mg 24 hr tablet Take 1 tablet (150 mg total) by mouth every morning 30 tablet 5   • carvedilol (COREG) 25 mg tablet TAKE 1 TABLET BY MOUTH TWICE A DAY WITH FOOD 180 tablet 3   • clopidogrel (PLAVIX) 75 mg tablet TAKE 1 TABLET BY MOUTH EVERY DAY 90 tablet 3   • DULoxetine (CYMBALTA) 60 mg delayed release capsule TAKE 1 CAPSULE BY MOUTH EVERY DAY 90 capsule 3   • furosemide (LASIX) 40 mg tablet Take 1 tablet (40 mg total) by mouth daily Do not start before January 28, 2023  90 tablet 0   • gabapentin (NEURONTIN) 400 mg capsule TAKE 1 CAPSULE BY MOUTH 2 TIMES A DAY   180 capsule 3   • isosorbide mononitrate (IMDUR) 30 mg 24 hr tablet Take 1 tablet (30 mg total) by mouth daily 90 tablet 3   • lidocaine (Lidoderm) 5 % Remove & Discard patch within 12 hours or as directed by MD Use 1 patch in each knee daily 60 patch 5   • losartan (COZAAR) 100 MG tablet TAKE 1 TABLET BY MOUTH EVERY DAY 90 tablet 3   • nitroglycerin (NITROSTAT) 0 4 mg SL tablet Place 1 tablet (0 4 mg total) under the tongue every 5 (five) minutes as needed for chest pain 50 tablet 2   • Omega-3 Fatty Acids (FISH OIL PO) Take 1,000 mg by mouth daily     • ranolazine (RANEXA) 1000 MG SR tablet TAKE 1 TABLET (1,000 MG TOTAL) BY MOUTH EVERY 12 (TWELVE) HOURS 180 tablet 2   • ZINC GLUCONATE PO Take 50 mg by mouth daily       No current facility-administered medications on file prior to visit         Social History     Tobacco Use   • Smoking status: Never   • Smokeless tobacco: Never   Vaping Use   • Vaping Use: Never used   Substance Use Topics   • Alcohol use: Never     Comment: minor   • Drug use: Never       Family History   Problem Relation Age of Onset   • Heart disease Father              Procedures Performed     Procedures  None       Mami Christine,

## 2023-02-01 ENCOUNTER — TELEPHONE (OUTPATIENT)
Dept: CARDIOLOGY CLINIC | Facility: CLINIC | Age: 68
End: 2023-02-01

## 2023-02-01 DIAGNOSIS — I35.0 NONRHEUMATIC AORTIC VALVE STENOSIS: Primary | ICD-10-CM

## 2023-02-01 NOTE — TELEPHONE ENCOUNTER
Patient Marylin Braxton [phone] contacting office inquiring if Dr Juliane Singh recommended he see a cardiothoracic surgeon after his cath  If so, please put referral in system

## 2023-02-02 DIAGNOSIS — F33.1 MODERATE EPISODE OF RECURRENT MAJOR DEPRESSIVE DISORDER (HCC): ICD-10-CM

## 2023-02-02 RX ORDER — BUPROPION HYDROCHLORIDE 150 MG/1
TABLET ORAL
Qty: 90 TABLET | Refills: 2 | Status: SHIPPED | OUTPATIENT
Start: 2023-02-02

## 2023-02-07 ENCOUNTER — TELEPHONE (OUTPATIENT)
Dept: PAIN MEDICINE | Facility: CLINIC | Age: 68
End: 2023-02-07

## 2023-02-07 NOTE — PROGRESS NOTES
Assessment:  1  Primary osteoarthritis of both knees    2  Chronic pain of right knee    3  Chronic pain of left knee        Plan:  Orders Placed This Encounter   Procedures   • FL spine and pain procedure     Standing Status:   Future     Standing Expiration Date:   2/8/2027     Scheduling Instructions:      Please check whether we can do one round of blocks before RFA  Order Specific Question:   Reason for Exam:     Answer:   Bilateral genicular nerve block #1     Order Specific Question:   Anticoagulant hold needed? Answer:   no       No orders of the defined types were placed in this encounter  My impressions and treatment recommendations were discussed in detail with the patient, who verbalized understanding and had no further questions  This is a 42-year-old male who presents the office with chief complaint of bilateral knee pain secondary to primary osteoarthritis  He has failed number of corticosteroid injections as well as viscosupplementation  He is not a surgical candidate at this time due to elevated BMI  At this juncture we discussed bilateral genicular nerve block followed by radiofrequency ablation of greater than 80% relief  He is agreeable to this plan  We will see if he will require 2 blocks before we can do RFA as his issue is quite obviously due to osteoarthritic knee pain  [place] Pres[employer] Drug Monitoring Program report was reviewed and was appropriate     Complete risks and benefits including bleeding, infection, tissue reaction, nerve injury and allergic reaction were discussed  The approach was demonstrated using models and literature was provided  Verbal and written consent was obtained  Discharge instructions were provided  I personally saw and examined the patient and I agree with the above discussed plan of care      History of Present Illness:    Otto Verdugo is a 79 y o  male who presents to [facility] for initial evaluation of the above stated pain complaints  The patient has a past medical and chronic pain history as outlined in the assessment section  He was referred by Carla Garcia PA-C  [address] [address]   Complaint of bilateral knee pain  Ongoing for 1 year  Patient is a   Moderate to severe intensity over the past month  4 out of 10  Nearly constant  Afternoon is the worst   Pressure-like, dull/aching  Ambulates with a cane at times  Next  Increase standing, walking, exercising  No change with coughing, sneezing, bowel movement  Decreased relaxation, lying down, sitting  Past which includes CAD, depression, heart attack, hypertension  Reports moderately with osteopathic manipulation  Moderate relief with heat/ice therapy  No tobacco or marijuana use  Drinks alcohol 2 to 5 glasses/week  Not allergic to latex or contrast dye  In the past has been on tramadol  Currently using lidocaine with relief  Also in the past use Voltaren gel  Currently using acetaminophen with relief  Also currently using naproxen with relief  Currently using gabapentin with some relief as well  Review of Systems:    Review of Systems   Constitutional: Negative for fever and unexpected weight change  HENT: Negative for trouble swallowing  Eyes: Negative for visual disturbance  Respiratory: Positive for shortness of breath  Negative for wheezing  Cardiovascular: Positive for leg swelling  Negative for chest pain and palpitations  Gastrointestinal: Negative for constipation, diarrhea, nausea and vomiting  Endocrine: Negative for cold intolerance, heat intolerance and polydipsia  Genitourinary: Negative for difficulty urinating and frequency  Musculoskeletal: Positive for arthralgias and joint swelling  Negative for gait problem  Skin: Negative for rash  Neurological: Negative for dizziness, seizures, syncope, weakness and headaches     Hematological: Does not bruise/bleed easily  Psychiatric/Behavioral: Negative for dysphoric mood  Depression   All other systems reviewed and are negative  Past Medical History:   Diagnosis Date   • Arthritis 2008   • Cough    • Heart disease 2000   • HTN (hypertension)    • Hyperlipidemia    • Hypertension 2000   • Osteoarthritis 2002   • Sleep apnea, obstructive    • SOB (shortness of breath)    • Wheezing        Past Surgical History:   Procedure Laterality Date   • CARDIAC CATHETERIZATION Left 1/27/2023    Procedure: Cardiac Left Heart Cath;  Surgeon: Sarah Meneses MD;  Location: [address] [place] [address] CATH LAB; Service: Cardiology   • CARDIAC CATHETERIZATION  1/27/2023    Procedure: Cardiac catheterization;  Surgeon: Sarah Meneses MD;  Location: [address] [place] [address] CATH LAB; Service: Cardiology   • CORONARY ANGIOPLASTY WITH STENT PLACEMENT N/A    • CORONARY ARTERY BYPASS GRAFT      12 years ago   • KNEE CARTILAGE SURGERY Left     left knee meniscus repair       Family History   Problem Relation Age of Onset   • Heart disease Father    • Arthritis Father    • Hypertension Father    • Stroke Father    • Arthritis Paternal Grandfather        Social History     Occupational History   • Occupation: employed   Tobacco Use   • Smoking status: Never   • Smokeless tobacco: Never   • Tobacco comments:     Never Used   Vaping Use   • Vaping Use: Never used   Substance and Sexual Activity   • Alcohol use:  Yes     Alcohol/week: 7 0 standard drinks     Types: 5 Cans of beer, 2 Shots of liquor per week     Comment: minor   • Drug use: Never   • Sexual activity: Not Currently     Partners: Female     Birth control/protection: Female Sterilization         Current Outpatient Medications:   •  Ascorbic Acid (VITAMIN C PO), Take 100 mg by mouth daily, Disp: , Rfl:   •  atorvastatin (LIPITOR) 40 mg tablet, TAKE 1 TABLET BY MOUTH DAILY WITH DINNER, Disp: 90 tablet, Rfl: 3  •  buPROPion (WELLBUTRIN XL) 150 mg 24 hr tablet, TAKE 1 TABLET BY MOUTH EVERY DAY IN THE MORNING, Disp: 90 tablet, Rfl: 2  •  carvedilol (COREG) 25 mg tablet, TAKE 1 TABLET BY MOUTH TWICE A DAY WITH FOOD, Disp: 180 tablet, Rfl: 3  •  clopidogrel (PLAVIX) 75 mg tablet, TAKE 1 TABLET BY MOUTH EVERY DAY, Disp: 90 tablet, Rfl: 3  •  DULoxetine (CYMBALTA) 60 mg delayed release capsule, TAKE 1 CAPSULE BY MOUTH EVERY DAY, Disp: 90 capsule, Rfl: 3  •  furosemide (LASIX) 40 mg tablet, Take 1 tablet (40 mg total) by mouth daily Do not start before January 28, 2023 , Disp: 90 tablet, Rfl: 0  •  gabapentin (NEURONTIN) 400 mg capsule, TAKE 1 CAPSULE BY MOUTH 2 TIMES A DAY , Disp: 180 capsule, Rfl: 3  •  isosorbide mononitrate (IMDUR) 30 mg 24 hr tablet, Take 1 tablet (30 mg total) by mouth daily, Disp: 90 tablet, Rfl: 3  •  lidocaine (Lidoderm) 5 %, Remove & Discard patch within 12 hours or as directed by MD Use 1 patch in each knee daily, Disp: 60 patch, Rfl: 5  •  losartan (COZAAR) 100 MG tablet, TAKE 1 TABLET BY MOUTH EVERY DAY, Disp: 90 tablet, Rfl: 3  •  nitroglycerin (NITROSTAT) 0 4 mg SL tablet, Place 1 tablet (0 4 mg total) under the tongue every 5 (five) minutes as needed for chest pain, Disp: 50 tablet, Rfl: 2  •  Omega-3 Fatty Acids (FISH OIL PO), Take 1,000 mg by mouth daily, Disp: , Rfl:   •  ranolazine (RANEXA) 1000 MG SR tablet, TAKE 1 TABLET (1,000 MG TOTAL) BY MOUTH EVERY 12 (TWELVE) HOURS, Disp: 180 tablet, Rfl: 2  •  ZINC GLUCONATE PO, Take 50 mg by mouth daily, Disp: , Rfl:     Allergies   Allergen Reactions   • Medical Tape Other (See Comments) and Rash   • Sulfa Antibiotics Other (See Comments)   • Fosinopril Cough     Cough     • Lisinopril Cough   • Wound Dressing Adhesive Dermatitis and Rash       Physical Exam:    /88 (BP Location: Left arm, Patient Position: Sitting, Cuff Size: Standard)   Pulse (!) 52   Ht 5' 9" (1 753 m)   Wt (!) 142 kg (313 lb)   BMI 46 22 kg/m²     Constitutional: normal, well developed, well nourished, alert, in no distress and non-toxic and no overt pain behavior  Eyes: anicteric  HEENT: grossly intact  Neck: supple, symmetric, trachea midline and no masses   Pulmonary:even and unlabored  Cardiovascular:No edema or pitting edema present  Skin:Normal without rashes or lesions and well hydrated  Psychiatric:Mood and affect appropriate  Neurologic:Cranial Nerves II-XII grossly intact  Musculoskeletal: Bilateral knees appear nonerythematous  No swelling  There is tenderness palpation over the medial joint line of the right knee  Imaging  Bilateral tibia and fibula series:   1/30/2023     INDICATION:   M17 0: Bilateral primary osteoarthritis of knee      COMPARISON:  Bilateral knee radiographs 1/10/2023     VIEWS:  XR TIBIA FIBULA 2 VW LEFT, XR TIBIA FIBULA 2 VW RIGHT         FINDINGS:     Bilateral AP views of the knees were performed  Lateral views were not submitted      There is no acute fracture or dislocation      Bilateral tricompartmental degenerative changes of the knees with severe narrowing of the bilateral medial compartments      No lytic or blastic osseous lesion      There are atherosclerotic calcifications  Soft tissues are otherwise unremarkable      IMPRESSION:     No acute osseous abnormality      Degenerative changes as described              LEFT KNEE   1/10/2023     INDICATION:   M17 0: Bilateral primary osteoarthritis of knee      COMPARISON:  None     VIEWS:  XR KNEE 4+ VW LEFT INJURY         FINDINGS:     There is no acute fracture or dislocation      There is a small joint effusion      Severe tricompartmental osteoarthritis as evidenced by joint space narrowing, osteophyte formation and subchondral sclerosis  Varus deformity with subluxation      No lytic or blastic osseous lesion      Soft tissues are unremarkable      IMPRESSION:     No acute osseous abnormality        RIGHT KNEE   1/10/2023     INDICATION:   M17 0: Bilateral primary osteoarthritis of knee      COMPARISON:  None     VIEWS:  XR KNEE 4+ VW RIGHT INJURY       FINDINGS:     There is no acute fracture or dislocation      There is no joint effusion      Severe tricompartmental osteoarthritis as evidenced by joint space narrowing, osteophyte formation and subchondral sclerosis  Varus deformity    Calcification posteriorly measuring 1 6 cm may suggest loose body     No lytic or blastic osseous lesion      Soft tissues are unremarkable      IMPRESSION:     No acute osseous abnormality      FL spine and pain procedure    (Results Pending)       Orders Placed This Encounter   Procedures   • FL spine and pain procedure

## 2023-02-08 ENCOUNTER — CONSULT (OUTPATIENT)
Dept: PAIN MEDICINE | Facility: CLINIC | Age: 68
End: 2023-02-08

## 2023-02-08 VITALS
HEIGHT: 69 IN | WEIGHT: 313 LBS | SYSTOLIC BLOOD PRESSURE: 151 MMHG | HEART RATE: 52 BPM | DIASTOLIC BLOOD PRESSURE: 88 MMHG | BODY MASS INDEX: 46.36 KG/M2

## 2023-02-08 DIAGNOSIS — M25.562 CHRONIC PAIN OF LEFT KNEE: ICD-10-CM

## 2023-02-08 DIAGNOSIS — G89.29 CHRONIC PAIN OF LEFT KNEE: ICD-10-CM

## 2023-02-08 DIAGNOSIS — G89.29 CHRONIC PAIN OF RIGHT KNEE: ICD-10-CM

## 2023-02-08 DIAGNOSIS — M17.0 PRIMARY OSTEOARTHRITIS OF BOTH KNEES: ICD-10-CM

## 2023-02-08 DIAGNOSIS — M25.561 CHRONIC PAIN OF RIGHT KNEE: ICD-10-CM

## 2023-02-08 NOTE — PATIENT INSTRUCTIONS
Knee Pain   WHAT YOU NEED TO KNOW:   Knee pain may start suddenly, or it may be a long-term problem  You may have pain on the side, front, or back of your knee  You may have knee stiffness and swelling  You may hear popping sounds or feel like your knee is giving way or locking up as you walk  You may feel pain when you sit, stand, walk, or climb up and down stairs  Knee pain can be caused by conditions such as obesity, inflammation, or strains or tears in ligaments or tendons  DISCHARGE INSTRUCTIONS:   Return to the emergency department if:   Your pain is worse, even after treatment  You cannot bend or straighten your leg completely  The swelling around your knee does not go down even with treatment  Your knee is painful and hot to the touch  Contact your healthcare provider if:   You have questions or concerns about your condition or care  Medicines: You may need any of the following:  NSAIDs  help decrease swelling and pain or fever  This medicine is available with or without a doctor's order  NSAIDs can cause stomach bleeding or kidney problems in certain people  If you take blood thinner medicine, always ask your healthcare provider if NSAIDs are safe for you  Always read the medicine label and follow directions  Acetaminophen  decreases pain and fever  It is available without a doctor's order  Ask how much to take and how often to take it  Follow directions  Read the labels of all other medicines you are using to see if they also contain acetaminophen, or ask your doctor or pharmacist  Acetaminophen can cause liver damage if not taken correctly  Do not use more than 4 grams (4,000 milligrams) total of acetaminophen in one day  Prescription pain medicine  may be given  Ask your healthcare provider how to take this medicine safely  Some prescription pain medicines contain acetaminophen   Do not take other medicines that contain acetaminophen without talking to your healthcare provider  Too much acetaminophen may cause liver damage  Prescription pain medicine may cause constipation  Ask your healthcare provider how to prevent or treat constipation  Take your medicine as directed  Contact your healthcare provider if you think your medicine is not helping or if you have side effects  Tell him or her if you are allergic to any medicine  Keep a list of the medicines, vitamins, and herbs you take  Include the amounts, and when and why you take them  Bring the list or the pill bottles to follow-up visits  Carry your medicine list with you in case of an emergency  What you can do to manage your symptoms:   Rest your knee so it can heal   Limit activities that increase your pain  Do low-impact exercises, such as walking or swimming  Apply ice to help reduce swelling and pain  Use an ice pack, or put crushed ice in a plastic bag  Cover it with a towel before you apply it to your knee  Apply ice for 15 to 20 minutes every hour, or as directed  Apply compression to help reduce swelling  Use a brace or bandage only as directed  Elevate your knee to help decrease pain and swelling  Elevate your knee while you are sitting or lying down  Prop your leg on pillows to keep your knee above the level of your heart  Prevent your knee from moving as directed  Your healthcare provider may put on a cast or splint  You may need to wear a leg brace to stabilize your knee  A leg brace can be adjusted to increase your range of motion as your knee heals  What you can do to prevent knee pain:   Maintain a healthy weight  Extra weight increases your risk for knee pain  Ask your healthcare provider how much you should weigh  He or she can help you create a safe weight loss plan if you need to lose weight  Exercise or train properly  Use the correct equipment for sports  Wear shoes that provide good support  Check your posture often as you exercise, play sports, or train for an event  This can help prevent stress and strain on your knees  Rest between sessions so you do not overwork your knees  Follow up with your healthcare provider within 24 hours or as directed: You may need follow-up treatments, such as steroid injections to decrease pain  Write down your questions so you remember to ask them during your visits  © Copyright [employer] 2022 Information is for End User's use only and may not be sold, redistributed or otherwise used for commercial purposes  All illustrations and images included in CareNotes® are the copyrighted property of [employer]  or [facility] Naveen Peters   The above information is an  only  It is not intended as medical advice for individual conditions or treatments  Talk to your doctor, nurse or pharmacist before following any medical regimen to see if it is safe and effective for you

## 2023-02-10 ENCOUNTER — TELEPHONE (OUTPATIENT)
Dept: OBGYN CLINIC | Facility: MEDICAL CENTER | Age: 68
End: 2023-02-10

## 2023-02-10 ENCOUNTER — TELEPHONE (OUTPATIENT)
Dept: PAIN MEDICINE | Facility: MEDICAL CENTER | Age: 68
End: 2023-02-10

## 2023-02-10 DIAGNOSIS — G57.91 NEUROPATHY OF RIGHT LOWER EXTREMITY: ICD-10-CM

## 2023-02-10 DIAGNOSIS — G89.29 CHRONIC PAIN OF RIGHT KNEE: ICD-10-CM

## 2023-02-10 DIAGNOSIS — M25.562 CHRONIC PAIN OF LEFT KNEE: ICD-10-CM

## 2023-02-10 DIAGNOSIS — M17.0 PRIMARY OSTEOARTHRITIS OF BOTH KNEES: Primary | ICD-10-CM

## 2023-02-10 DIAGNOSIS — G89.29 CHRONIC PAIN OF LEFT KNEE: ICD-10-CM

## 2023-02-10 DIAGNOSIS — M25.561 CHRONIC PAIN OF RIGHT KNEE: ICD-10-CM

## 2023-02-10 NOTE — TELEPHONE ENCOUNTER
Caller: Melani Worthington     Doctor: Dr Dajuan Strickland     Reason for call: Patient calling to schedule procedure    Call back#: [phone]

## 2023-02-10 NOTE — TELEPHONE ENCOUNTER
Caller: Iram Oliva    Doctor: Louis Lance     Reason for call: The patient is calling and stating he needs a referral for Pain Management  He was not happy with Spine and pain in [facility]  He would like to go to [facility]  Please advise the patient  Patient would like this in mychart       Call back#: [phone]

## 2023-02-16 ENCOUNTER — OFFICE VISIT (OUTPATIENT)
Dept: OBGYN CLINIC | Facility: MEDICAL CENTER | Age: 68
End: 2023-02-16

## 2023-02-16 VITALS
SYSTOLIC BLOOD PRESSURE: 168 MMHG | BODY MASS INDEX: 46.36 KG/M2 | DIASTOLIC BLOOD PRESSURE: 76 MMHG | WEIGHT: 313 LBS | HEIGHT: 69 IN | HEART RATE: 57 BPM

## 2023-02-16 DIAGNOSIS — M17.0 PRIMARY OSTEOARTHRITIS OF BOTH KNEES: Primary | ICD-10-CM

## 2023-02-16 DIAGNOSIS — M17.0 PRIMARY OSTEOARTHRITIS OF BOTH KNEES: ICD-10-CM

## 2023-02-16 RX ADMIN — BUPIVACAINE HYDROCHLORIDE 4 ML: 2.5 INJECTION, SOLUTION INFILTRATION; PERINEURAL at 18:59

## 2023-02-16 RX ADMIN — TRIAMCINOLONE ACETONIDE 40 MG: 40 INJECTION, SUSPENSION INTRA-ARTICULAR; INTRAMUSCULAR at 18:59

## 2023-02-16 NOTE — PROGRESS NOTES
Knee New Office Note    Assessment:     1  Primary osteoarthritis of both knees        Plan:     Problem List Items Addressed This Visit    None  Visit Diagnoses     Primary osteoarthritis of both knees              78 y/o male with bilateral knee pain secondary to severe bone on bone OA  He has been treating conservatively with intermittent injections  Xrays of the bilateral knees reviewed today showing that there is severe bone on bone arthritic changes with osteophyte formation  On physical exam he has significant loss of range of motion with pain medially  Due to his cardiac and valve issues, he needs to follow up with cardiology to see if he is planning on on getting valve replacement surgery  Also reviewed the patient's weight and his elevated BMI  He has an upcoming visit with weight management  He has not had a cortisone injection in several years, so bilateral knee cortisone injections performed today  Will see him back in 3 months and can consider repeat cortisone injections  Subjective:     Patient ID: Otto Joy is a 79 y o  male  Chief Complaint:  HPI:  Patient is a 78 y/o male who presents today for an evaluation of his bilateral knees  He is referred today by Dr Evelina Parikh  He has known severe OA of the bilateral knees, which he has been treating for conservatively  He has a history of steroids in the past by Dr Maryan Hernández with only a few weeks of relief  Most recently she had visco injections with Dr Colette Mayen in October with short term relief in symptoms  He has progressively worsening pain in the bilateral knees and ambulates with an SPC  He is struggling with his ADLs and is unable to walk his dog due to the pain  He does have an appointment with weight management coming up next month  He has aortic valve stenosis, he sees cardiology and they are thinking about doing a valve replacement  He has a history of a CABG from 10 years ago      Allergy:  Allergies   Allergen Reactions • Medical Tape Other (See Comments) and Rash   • Sulfa Antibiotics Other (See Comments)   • Fosinopril Cough     Cough     • Lisinopril Cough   • Wound Dressing Adhesive Dermatitis and Rash     Medications:  all current active meds have been reviewed  Past Medical History:  Past Medical History:   Diagnosis Date   • Arthritis 2008   • Cough    • Heart disease 2000   • HTN (hypertension)    • Hyperlipidemia    • Hypertension 2000   • Osteoarthritis 2002   • Sleep apnea, obstructive    • SOB (shortness of breath)    • Wheezing      Past Surgical History:  Past Surgical History:   Procedure Laterality Date   • CARDIAC CATHETERIZATION Left 1/27/2023    Procedure: Cardiac Left Heart Cath;  Surgeon: Varghese Shelley MD;  Location: [address] [place] [address] CATH LAB; Service: Cardiology   • CARDIAC CATHETERIZATION  1/27/2023    Procedure: Cardiac catheterization;  Surgeon: Varghese Shelley MD;  Location: [address] [place] [address] CATH LAB;   Service: Cardiology   • CORONARY ANGIOPLASTY WITH STENT PLACEMENT N/A    • CORONARY ARTERY BYPASS GRAFT      12 years ago   • KNEE CARTILAGE SURGERY Left     left knee meniscus repair     Family History:  Family History   Problem Relation Age of Onset   • Heart disease Father    • Arthritis Father    • Hypertension Father    • Stroke Father    • Arthritis Paternal Grandfather      Social History:  Social History     Substance and Sexual Activity   Alcohol Use Yes   • Alcohol/week: 7 0 standard drinks   • Types: 5 Cans of beer, 2 Shots of liquor per week    Comment: minor     Social History     Substance and Sexual Activity   Drug Use Never     Social History     Tobacco Use   Smoking Status Never   Smokeless Tobacco Never   Tobacco Comments    Never Used           ROS:  General: Per HPI  Skin: Negative, except if noted below  HEENT: Negative  Respiratory: Negative  Cardiovascular: Negative  Gastrointestinal: Negative  Urinary: Negative  Vascular: Negative  Musculoskeletal: Positive per HPI   Neurologic: Positive per HPI  Endocrine: Negative    Objective:  BP Readings from Last 1 Encounters:   02/16/23 168/76      Wt Readings from Last 1 Encounters:   02/16/23 (!) 142 kg (313 lb)        Respiratory:   non-labored respirations    Lymphatics:  no palpable lymph nodes    Gait:   antalgic    Neurologic:   Alert and oriented times 3  Patient with normal sensation except as noted below  Deep tendon reflexes 2+ except as noted in MSK exam    Bilateral Lower Extremity:  Left Knee: Inspection:  Skin intact, venous statis dermatitis changes noted  Overall limb alignment varus    Effusion:     ROM 15-95* with pain    Extensor Lag: none    Palpation: medial Joint line tenderness to palpation    AP Stability at 90 deg stable    M/L stability in full extension stable    M/L stability in midflexion stable    Motor: 5/5 IP/Q/HS/TA/GS    Pulses: 2+ DP / 2+ PT    SILT DP/SP/S/S/TN    Right knee: Inspection:  Skin intact, venous statis dermatitis changes noted  Overall limb alignment varus    Effusion:     ROM 15-95* with pain    Extensor Lag: none    Palpation: medial Joint line tenderness to palpation    AP Stability at 90 deg stable    M/L stability in full extension stable    M/L stability in midflexion stable    Motor: 5/5 IP/Q/HS/TA/GS    Pulses: 2+ DP / 2+ PT    SILT DP/SP/S/S/TN    Imaging:  My interpretation XR AP scanogram/AP bilateral knee/lateral/fernandez/sunrise bilateral knees: severe, end stage joint space narrowing, subchondral sclerosis, subchondral cysts, osteophyte formation  No fracture or dislocation  Significant varus alignment  Large joint arthrocentesis: bilateral knee  Universal Protocol:  Consent: Verbal consent obtained    Risks and benefits: risks, benefits and alternatives were discussed  Consent given by: patient  Patient understanding: patient states understanding of the procedure being performed  Patient identity confirmed: verbally with patient    Supporting Documentation  Indications: pain Procedure Details  Location: knee - bilateral knee  Needle size: 22 G  Approach: anterolateral    Medications (Right): 4 mL bupivacaine 0 25 %; 40 mg triamcinolone acetonide 40 mg/mLMedications (Left): 4 mL bupivacaine 0 25 %; 40 mg triamcinolone acetonide 40 mg/mL   Patient tolerance: patient tolerated the procedure well with no immediate complications  Dressing:  Sterile dressing applied        BMI:   Estimated body mass index is 46 22 kg/m² as calculated from the following:    Height as of this encounter: 5' 9" (1 753 m)  Weight as of this encounter: 142 kg (313 lb)  BSA:   Estimated body surface area is 2 5 meters squared as calculated from the following:    Height as of this encounter: 5' 9" (1 753 m)  Weight as of this encounter: 142 kg (313 lb)             Scribe Attestation    I,:  Rj Mayorga PA-C am acting as a scribe while in the presence of the attending physician :       I,:  Jorge L Rea DO personally performed the services described in this documentation    as scribed in my presence :

## 2023-02-17 RX ORDER — BUPIVACAINE HYDROCHLORIDE 2.5 MG/ML
4 INJECTION, SOLUTION INFILTRATION; PERINEURAL
Status: COMPLETED | OUTPATIENT
Start: 2023-02-16 | End: 2023-02-16

## 2023-02-17 RX ORDER — TRIAMCINOLONE ACETONIDE 40 MG/ML
40 INJECTION, SUSPENSION INTRA-ARTICULAR; INTRAMUSCULAR
Status: COMPLETED | OUTPATIENT
Start: 2023-02-16 | End: 2023-02-16

## 2023-02-21 ENCOUNTER — TELEPHONE (OUTPATIENT)
Dept: INTERNAL MEDICINE CLINIC | Facility: CLINIC | Age: 68
End: 2023-02-21

## 2023-02-22 ENCOUNTER — OFFICE VISIT (OUTPATIENT)
Dept: CARDIAC SURGERY | Facility: CLINIC | Age: 68
End: 2023-02-22

## 2023-02-22 VITALS
BODY MASS INDEX: 44.82 KG/M2 | HEIGHT: 69 IN | SYSTOLIC BLOOD PRESSURE: 102 MMHG | DIASTOLIC BLOOD PRESSURE: 55 MMHG | OXYGEN SATURATION: 95 % | HEART RATE: 65 BPM | WEIGHT: 302.6 LBS

## 2023-02-22 DIAGNOSIS — I35.0 NONRHEUMATIC AORTIC VALVE STENOSIS: Primary | ICD-10-CM

## 2023-02-22 DIAGNOSIS — I20.0 UNSTABLE ANGINA (HCC): ICD-10-CM

## 2023-02-22 NOTE — PROGRESS NOTES
Consultation - Cardiothoracic Surgery   Otto Delgadillo 79 y o  male MRN: [id number]    Physician Requesting Consult: No att  providers found    Reason for Consult / Principal Problem: Aortic stenosis, Non-Rheumatic, Coronary artery disease    History of Present Illness: Otto Delgadillo is a 79y o  year old male who presents for initial outpatient surgical consultation for symptomatic severe aortic stenosis  Patient has a significant medical history of HTN, HLD, GUME (uses CPAP), morbid obesity (BMI 45), CAD s/p CABG x2 with Dr Marisabel Benz in 2011, bifurcating stents to [address] in 2013, and KATERINE to dLM and LCx in 2020  He also has a history of a PE about 2 years ago, and b/l varicose veins  He has continued follow up with Dr Lucie Luna, and presented to him with worsening symptoms of chest heaviness and shortness of breath, which prompted an ECHO and cardiac cath, both of which were performed in January  2D ECHO revealing moderate to severe AS, and cardiac cath revealed ostial LAD stenosis (100% )  He was referred to cardiac surgery for consideration of TAVR for aortic valve stenosis  Upon interview, patient reports that he has shortness of breath, both with exertion and at rest  He also endorses lightheadedness, chest heaviness and significant LE edema  He is still working, 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 [employer]  He ambulates with a cane due to his significant b/l knee pain  He needs b/l TKR's and orthopedist required cardiac clearance  He is a lifelong nonsmoker, drinks only occasionally, and does not use recreational drugs  Patient does not have a dentist currently, and has not seen one in a long time  He is COVID vaccinated x3       Past Medical History:  Past Medical History:   Diagnosis Date   • Arthritis 2008   • Cough    • Heart disease 2000   • HTN (hypertension)    • Hyperlipidemia    • Hypertension 2000   • Osteoarthritis 2002   • Sleep apnea, obstructive    • SOB (shortness of breath)    • Wheezing          Past Surgical History:   Past Surgical History:   Procedure Laterality Date   • CARDIAC CATHETERIZATION Left 1/27/2023    Procedure: Cardiac Left Heart Cath;  Surgeon: Kailey Harry MD;  Location: [address] [place] [address] CATH LAB; Service: Cardiology   • CARDIAC CATHETERIZATION  1/27/2023    Procedure: Cardiac catheterization;  Surgeon: Kailey Harry MD;  Location: [address] [place] [address] CATH LAB; Service: Cardiology   • CORONARY ANGIOPLASTY WITH STENT PLACEMENT N/A    • CORONARY ARTERY BYPASS GRAFT      12 years ago   • KNEE CARTILAGE SURGERY Left     left knee meniscus repair         Family History:  Family History   Problem Relation Age of Onset   • Heart disease Father    • Arthritis Father    • Hypertension Father    • Stroke Father    • Arthritis Paternal Grandfather          Social History:    Social History     Substance and Sexual Activity   Alcohol Use Yes   • Alcohol/week: 7 0 standard drinks   • Types: 5 Cans of beer, 2 Shots of liquor per week    Comment: minor     Social History     Substance and Sexual Activity   Drug Use Never     Social History     Tobacco Use   Smoking Status Never   Smokeless Tobacco Never   Tobacco Comments    Never Used         Home Medications:   Prior to Admission medications    Medication Sig Start Date End Date Taking?  Authorizing Provider   Ascorbic Acid (VITAMIN C PO) Take 100 mg by mouth daily    Historical Provider, MD   atorvastatin (LIPITOR) 40 mg tablet TAKE 1 TABLET BY MOUTH DAILY WITH DINNER 11/21/22   Mara Patterson MD   buPROPion (WELLBUTRIN XL) 150 mg 24 hr tablet TAKE 1 TABLET BY MOUTH EVERY DAY IN THE MORNING 2/2/23   Mara Patterson MD   carvedilol (COREG) 25 mg tablet TAKE 1 TABLET BY MOUTH TWICE A DAY WITH FOOD 11/21/22   Mara Patterson MD   clopidogrel (PLAVIX) 75 mg tablet TAKE 1 TABLET BY MOUTH EVERY DAY 1/6/23   Mara Patterson MD   DULoxetine (CYMBALTA) 60 mg delayed release capsule TAKE 1 CAPSULE BY MOUTH EVERY DAY 11/21/22   Marino Burton MD   furosemide (LASIX) 40 mg tablet Take 1 tablet (40 mg total) by mouth daily Do not start before January 28, 2023 1/28/23   Cheryl Wilkes PA-C   gabapentin (NEURONTIN) 400 mg capsule TAKE 1 CAPSULE BY MOUTH 2 TIMES A DAY  12/5/22   Marino Burton MD   isosorbide mononitrate (IMDUR) 30 mg 24 hr tablet Take 1 tablet (30 mg total) by mouth daily 1/20/23   George Sidhu MD   lidocaine (Lidoderm) 5 % Remove & Discard patch within 12 hours or as directed by MD Use 1 patch in each knee daily 1/23/23   Marino Burton MD   losartan (COZAAR) 100 MG tablet TAKE 1 TABLET BY MOUTH EVERY DAY 11/21/22   Marino Burton MD   nitroglycerin (NITROSTAT) 0 4 mg SL tablet Place 1 tablet (0 4 mg total) under the tongue every 5 (five) minutes as needed for chest pain 1/20/23   George Sidhu MD   Omega-3 Fatty Acids (FISH OIL PO) Take 1,000 mg by mouth daily    Historical Provider, MD   ranolazine (RANEXA) 1000 MG SR tablet TAKE 1 TABLET (1,000 MG TOTAL) BY MOUTH EVERY 12 (TWELVE) HOURS 1/6/23   Marino Burton MD   ZINC GLUCONATE PO Take 50 mg by mouth daily    Historical Provider, MD       Allergies:   Allergies   Allergen Reactions   • Medical Tape Other (See Comments) and Rash   • Sulfa Antibiotics Other (See Comments)   • Fosinopril Cough     Cough     • Lisinopril Cough   • Wound Dressing Adhesive Dermatitis and Rash       Review of Systems:  Review of Systems - History obtained from chart review and the patient  General ROS: positive for  - fatigue and change in activity tolerance   Psychological ROS: negative  Ophthalmic ROS: negative  ENT ROS: negative  Allergy and Immunology ROS: negative  Hematological and Lymphatic ROS: negative  Endocrine ROS: negative  Respiratory ROS: positive for - shortness of breath and chest tightness  Cardiovascular ROS: positive for - chest pain, dyspnea on exertion, edema, murmur and shortness of breath  negative for - irregular heartbeat, loss of consciousness, orthopnea, paroxysmal nocturnal dyspnea or rapid heart rate  Gastrointestinal ROS: no abdominal pain, change in bowel habits, or black or bloody stools  Genito-Urinary ROS: no dysuria, trouble voiding, or hematuria  Musculoskeletal ROS: positive for - bilateral knee pain   Neurological ROS: positive for - lightheadedness  Dermatological ROS: negative    Vital Signs:     Vitals:    02/22/23 1538   BP: 102/55   BP Location: Left arm   Patient Position: Sitting   Cuff Size: Large   Pulse: 65   SpO2: 95%   Weight: (!) 137 kg (302 lb 9 6 oz)   Height: 5' 9" (1 753 m)       Physical Exam:    General: alert, oriented, NAD   HEENT/NECK:  PERRL  No jugular venous distention  Cardiac:Regular rate and rhythm, grade IV/VI systolic Murmur   Carotid arteries: 2+, bruit vs radiation of cardiac murmur   Pulmonary:  Breath sounds clear bilaterally  Abdomen:  Non-tender, Non-distended  Positive bowel sounds  Upper extremities: 2+ radial pulses; brisk capillary refill  Lower extremities: Extremities warm/dry  PT/DP pulses 2+ bilaterally  2+ edema B/L  Neuro: Alert and oriented X 3  Sensation is grossly intact  No focal deficits  Musculoskeletal: MARTINEZ   Skin: Warm/Dry, without rashes or lesions  Lab Results:               Invalid input(s): LABGLOM      Lab Results   Component Value Date    HGBA1C 5 3 01/06/2023     Lab Results   Component Value Date    TROPONINI <0 02 09/12/2021       Imaging Studies:     Cardiac Catheterization 1/27/23  •  There is 100% stenosis of the ostial LAD  There is 40% instent stenosis of the proximal circumflex  Patent bifurcating stent in OM and mid circumflex Patent LIMA to LAD on non-selective imaging  SVG to OM known to be occluded based on prior cath reports  •  Left radial access  Hemostasis achieved by radial band  •  Right femoral access  Hemostasis achieved by manual pressure      There is 100% stenosis of the ostial LAD  There is 40% instent stenosis of the proximal circumflex  Patent LIMA to LAD on non-selective imaging  SVG to OM known to be occluded based on prior cath reports  Echocardiogram: 1/11/23  Interpretation Summary       •  Left Ventricle: Left ventricular cavity size is normal  Wall thickness is normal  The left ventricular ejection fraction is 55%  Systolic function is normal  Wall motion is normal  Diastolic function is normal   •  Aortic Valve: The leaflets are moderately calcified  There is moderate to severe stenosis  Peak gradient 47 mmHg and mean gradient 23 mmHg  No major change compared to previous echocardiogram from April 2022  •  Mitral Valve: There is mild annular calcification  •  Aorta: The ascending aorta is mildly dilated  4 0 cm      Findings    Left Ventricle Left ventricular cavity size is normal  Wall thickness is normal  The left ventricular ejection fraction is 55%  Systolic function is normal   Wall motion is normal  Diastolic function is normal    Right Ventricle Right ventricular cavity size is normal  Systolic function is normal  Wall thickness is normal    Left Atrium The atrium is normal in size  Right Atrium The atrium is normal in size  Aortic Valve The aortic valve was not well visualized  The leaflets are moderately calcified  There is moderate to severe stenosis  Peak gradient 47 mmHg and mean gradient 23 mmHg  No major change compared to previous echocardiogram from April 2022   Mitral Valve There is mild annular calcification  There is trace regurgitation  There is no evidence of stenosis  Tricuspid Valve Tricuspid valve structure is normal  There is trace regurgitation  There is no evidence of stenosis  Pulmonic Valve Pulmonic valve structure is normal  There is trace regurgitation  There is no evidence of stenosis  Ascending Aorta The ascending aorta is mildly dilated  4 0 cm  Pericardium There is no pericardial effusion  The pericardium is normal in appearance       Left Ventricle Measurements    Function/Volumes   A4C EF 57 %         LVOT stroke volume 142 39 cm3         LVOT stroke volume index 57 9 ml/m2         Dimensions   LVIDd 5 4 cm         LVIDS 3 4 cm         IVSd 1 6 cm         LVPWd 1 5 cm         LVOT area 4 52 cm2         FS 37 %         Diastolic Filling   MV E' Tissue Velocity Septal 7 cm/s         E wave deceleration time 280 ms         MV Peak E Jules 108 cm/s         MV Peak A Jules 0 78 m/s          Report Measurements   AV LVOT peak gradient 6 mmHg              Interventricular Septum Measurements    Shunt Ratio   LVOT peak VTI 31 49 cm         LVOT peak jules 1 22 m/s              Right Ventricle Measurements    Dimensions   RVID d 3 7 cm               Left Atrium Measurements    Dimensions   LA size 4 7 cm         LA length (A2C) 6 1 cm               Right Atrium Measurements    Dimensions   RA 2D Volume 47 mL         RAA A4C 18 4 cm2               Atrial Septum Measurements    Shunt Ratio   LVOT peak VTI 31 49 cm         LVOT peak jules 1 22 m/s               Aortic Valve Measurements    Stenosis   Aortic valve peak velocity 3 31 m/s         LVOT peak jules 1 22 m/s         Ao VTI 83 08 cm         LVOT peak VTI 31 49 cm         AV mean gradient 23 mmHg         LVOT mn grad 3 mmHg         AV peak gradient 44 mmHg         AV LVOT peak gradient 6 mmHg         Area/Dimensions   DVI 0 37          AV valve area 1 71 cm2         AV area by cont VTI 1 7 cm2         AV area peak jules 1 7 cm2         LVOT diameter 2 4 cm         LVOT area 4 52 cm2               Mitral Valve Measurements    Stenosis   MV stenosis pressure 1/2 time 81 ms         MV valve area p 1/2 method 2 72 cm2               Aorta Measurements    Aortic Dimensions   Ao root 3 6 cm         Asc Ao 4 cm                   I have personally reviewed pertinent reports     and I have personally reviewed pertinent films in PACS    TAVR evaluation Assessment:     [address]: III    Aortic Stenosis Stage: D3    5 Meter Walk:   1  10 sec   2  10 sec   3  10 sec     STS risk score (preliminary): 2 6%    KCCQ-12 completed    Assessment:  Patient Active Problem List    Diagnosis Date Noted   • Unstable angina ([place] [id number] ) 02/22/2023   • Nonrheumatic mitral valve regurgitation 11/18/2021   • Obesity hypoventilation syndrome ([place] [address] ) 09/23/2021   • Renal lesion 09/15/2021   • Pulmonary embolism ([place] [address] ) 09/12/2021   • Coronary artery disease of native artery of native heart with stable angina pectoris ([place] [address] ) 09/12/2021   • Chronic diastolic congestive heart failure ([place] [address] ) 09/12/2021   • Varicose veins of right lower extremity 09/12/2021   • Seasonal allergic rhinitis due to pollen 03/01/2019   • Chronic pain of both shoulders 04/25/2018   • Primary osteoarthritis of left knee 11/07/2017   • Nonrheumatic aortic valve stenosis 10/31/2017   • Hx of CABG 10/31/2017   • Pure hypercholesterolemia 08/03/2012   • Impaired fasting glucose 02/03/2012   • Depressive disorder 10/19/2009   • Morbid obesity ([place] [address] ) 05/29/2007   • Obstructive sleep apnea 05/29/2007   • S/P angioplasty with stent 05/29/2007   • Venous insufficiency 05/29/2007   • Cor pulmonale, chronic ([place] [address] ) 05/29/2007   • Bradycardia 05/29/2007   • Edema 06/21/2005     Severe aortic stenosis; Ongoing TAVR workup    Plan:    Otto Okeefe has symptomatic severe aortic stenosis  They will undergo the following testing for transcatheter aortic valve replacement: Gated CTA of the chest/abdomen/pelvis, 3D DEBBIE to better assess valve morphology and function, dental clearance, and carotid artery ultrasound  Once these studies have been completed, Otto Okeefe will follow up in our office to review the results and to be evaluated to confirm the suitability of proceeding with transcatheter aortic valve replacment  Otto Okeefe was comfortable with our recommendations, and their questions were answered to their satisfaction  Thank you for allowing us to participate in the care of this patient       Patient has active cologuard order in place from January 6, 2023    SIGNATURE: Lashanda Villa PA-C  DATE: February 22, 2023  TIME: 3:51 PM

## 2023-02-22 NOTE — LETTER
February 22, 2023     Rusty Faust MD  [address]/[facility] [address]    Patient: Shannan De La Rosa   YOB: 1955   Date of Visit: 2/22/2023       Dear Dr Chapa Knife:    Thank you for referring Otto Okeefe to me for evaluation  Below are my notes for this consultation  If you have questions, please do not hesitate to call me  I look forward to following your patient along with you  Sincerely,        Stanislav Heaton,         CC: MD Lashanda Barker PAJayneC  2/22/2023  4:22 PM  Attested  Consultation - Cardiothoracic Surgery   Otto Okeefe 79 y o  male MRN: [id number]    Physician Requesting Consult: No att  providers found    Reason for Consult / Principal Problem: Aortic stenosis, Non-Rheumatic, Coronary artery disease    History of Present Illness: Otto Pastor is a 79y o  year old male who presents for initial outpatient surgical consultation for symptomatic severe aortic stenosis  Patient has a significant medical history of HTN, HLD, GUME (uses CPAP), morbid obesity (BMI 45), CAD s/p CABG x2 with Dr Mikayla Sanderson in 2011, bifurcating stents to [address] in 2013, and KATERINE to dLM and LCx in 2020  He also has a history of a PE about 2 years ago, and b/l varicose veins  He has continued follow up with Dr Lacy Every, and presented to him with worsening symptoms of chest heaviness and shortness of breath, which prompted an ECHO and cardiac cath, both of which were performed in January  2D ECHO revealing moderate to severe AS, and cardiac cath revealed ostial LAD stenosis (100% )  He was referred to cardiac surgery for consideration of TAVR for aortic valve stenosis  Upon interview, patient reports that he has shortness of breath, both with exertion and at rest  He also endorses lightheadedness, chest heaviness and significant LE edema  He is still working, 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 [employer]   He ambulates with a cane due to his significant b/l knee pain  He needs b/l TKR's and orthopedist required cardiac clearance  He is a lifelong nonsmoker, drinks only occasionally, and does not use recreational drugs  Patient does not have a dentist currently, and has not seen one in a long time  He is COVID vaccinated x3  Past Medical History:  Past Medical History:   Diagnosis Date   • Arthritis 2008   • Cough    • Heart disease 2000   • HTN (hypertension)    • Hyperlipidemia    • Hypertension 2000   • Osteoarthritis 2002   • Sleep apnea, obstructive    • SOB (shortness of breath)    • Wheezing          Past Surgical History:   Past Surgical History:   Procedure Laterality Date   • CARDIAC CATHETERIZATION Left 1/27/2023    Procedure: Cardiac Left Heart Cath;  Surgeon: Papo Guerra MD;  Location: [address] [place] [address] CATH LAB; Service: Cardiology   • CARDIAC CATHETERIZATION  1/27/2023    Procedure: Cardiac catheterization;  Surgeon: Papo Guerra MD;  Location: [address] [place] [address] CATH LAB; Service: Cardiology   • CORONARY ANGIOPLASTY WITH STENT PLACEMENT N/A    • CORONARY ARTERY BYPASS GRAFT      12 years ago   • KNEE CARTILAGE SURGERY Left     left knee meniscus repair         Family History:  Family History   Problem Relation Age of Onset   • Heart disease Father    • Arthritis Father    • Hypertension Father    • Stroke Father    • Arthritis Paternal Grandfather          Social History:    Social History     Substance and Sexual Activity   Alcohol Use Yes   • Alcohol/week: 7 0 standard drinks   • Types: 5 Cans of beer, 2 Shots of liquor per week    Comment: minor     Social History     Substance and Sexual Activity   Drug Use Never     Social History     Tobacco Use   Smoking Status Never   Smokeless Tobacco Never   Tobacco Comments    Never Used         Home Medications:   Prior to Admission medications    Medication Sig Start Date End Date Taking?  Authorizing Provider   Ascorbic Acid (VITAMIN C PO) Take 100 mg by mouth daily    Historical Provider, MD   atorvastatin (LIPITOR) 40 mg tablet TAKE 1 TABLET BY MOUTH DAILY WITH DINNER 11/21/22   Dora Combs MD   buPROPion (WELLBUTRIN XL) 150 mg 24 hr tablet TAKE 1 TABLET BY MOUTH EVERY DAY IN THE MORNING 2/2/23   Dora Combs MD   carvedilol (COREG) 25 mg tablet TAKE 1 TABLET BY MOUTH TWICE A DAY WITH FOOD 11/21/22   Dora Cmobs MD   clopidogrel (PLAVIX) 75 mg tablet TAKE 1 TABLET BY MOUTH EVERY DAY 1/6/23   Dora Combs MD   DULoxetine (CYMBALTA) 60 mg delayed release capsule TAKE 1 CAPSULE BY MOUTH EVERY DAY 11/21/22   Dora Combs MD   furosemide (LASIX) 40 mg tablet Take 1 tablet (40 mg total) by mouth daily Do not start before January 28, 2023 1/28/23   Cheryl Wilkes PA-C   gabapentin (NEURONTIN) 400 mg capsule TAKE 1 CAPSULE BY MOUTH 2 TIMES A DAY  12/5/22   Dora Combs MD   isosorbide mononitrate (IMDUR) 30 mg 24 hr tablet Take 1 tablet (30 mg total) by mouth daily 1/20/23   Marie Deluca MD   lidocaine (Lidoderm) 5 % Remove & Discard patch within 12 hours or as directed by MD Use 1 patch in each knee daily 1/23/23   Dora Combs MD   losartan (COZAAR) 100 MG tablet TAKE 1 TABLET BY MOUTH EVERY DAY 11/21/22   Dora Combs MD   nitroglycerin (NITROSTAT) 0 4 mg SL tablet Place 1 tablet (0 4 mg total) under the tongue every 5 (five) minutes as needed for chest pain 1/20/23   Marie Deluca MD   Omega-3 Fatty Acids (FISH OIL PO) Take 1,000 mg by mouth daily    Historical Provider, MD   ranolazine (RANEXA) 1000 MG SR tablet TAKE 1 TABLET (1,000 MG TOTAL) BY MOUTH EVERY 12 (TWELVE) HOURS 1/6/23   Dora Combs MD   ZINC GLUCONATE PO Take 50 mg by mouth daily    Historical Provider, MD       Allergies:   Allergies   Allergen Reactions   • Medical Tape Other (See Comments) and Rash   • Sulfa Antibiotics Other (See Comments)   • Fosinopril Cough     Cough     • Lisinopril Cough   • Wound Dressing Adhesive Dermatitis and Rash       Review of Systems:  Review of Systems - History obtained from chart review and the patient  General ROS: positive for  - fatigue and change in activity tolerance   Psychological ROS: negative  Ophthalmic ROS: negative  ENT ROS: negative  Allergy and Immunology ROS: negative  Hematological and Lymphatic ROS: negative  Endocrine ROS: negative  Respiratory ROS: positive for - shortness of breath and chest tightness  Cardiovascular ROS: positive for - chest pain, dyspnea on exertion, edema, murmur and shortness of breath  negative for - irregular heartbeat, loss of consciousness, orthopnea, paroxysmal nocturnal dyspnea or rapid heart rate  Gastrointestinal ROS: no abdominal pain, change in bowel habits, or black or bloody stools  Genito-Urinary ROS: no dysuria, trouble voiding, or hematuria  Musculoskeletal ROS: positive for - bilateral knee pain   Neurological ROS: positive for - lightheadedness  Dermatological ROS: negative    Vital Signs:     Vitals:    02/22/23 1538   BP: 102/55   BP Location: Left arm   Patient Position: Sitting   Cuff Size: Large   Pulse: 65   SpO2: 95%   Weight: (!) 137 kg (302 lb 9 6 oz)   Height: 5' 9" (1 753 m)       Physical Exam:    General: alert, oriented, NAD   HEENT/NECK:  PERRL  No jugular venous distention  Cardiac:Regular rate and rhythm, grade IV/VI systolic Murmur   Carotid arteries: 2+, bruit vs radiation of cardiac murmur   Pulmonary:  Breath sounds clear bilaterally  Abdomen:  Non-tender, Non-distended  Positive bowel sounds  Upper extremities: 2+ radial pulses; brisk capillary refill  Lower extremities: Extremities warm/dry  PT/DP pulses 2+ bilaterally  2+ edema B/L  Neuro: Alert and oriented X 3  Sensation is grossly intact  No focal deficits  Musculoskeletal: MARTINEZ   Skin: Warm/Dry, without rashes or lesions        Lab Results:               Invalid input(s): LABGLOM      Lab Results   Component Value Date    HGBA1C 5 3 01/06/2023     Lab Results   Component Value Date    TROPONINI <0 02 09/12/2021       Imaging Studies: Cardiac Catheterization 1/27/23  •  There is 100% stenosis of the ostial LAD  There is 40% instent stenosis of the proximal circumflex  Patent bifurcating stent in OM and mid circumflex Patent LIMA to LAD on non-selective imaging  SVG to OM known to be occluded based on prior cath reports  •  Left radial access  Hemostasis achieved by radial band  •  Right femoral access  Hemostasis achieved by manual pressure      There is 100% stenosis of the ostial LAD  There is 40% instent stenosis of the proximal circumflex  Patent LIMA to LAD on non-selective imaging  SVG to OM known to be occluded based on prior cath reports  Echocardiogram: 1/11/23  Interpretation Summary       •  Left Ventricle: Left ventricular cavity size is normal  Wall thickness is normal  The left ventricular ejection fraction is 55%  Systolic function is normal  Wall motion is normal  Diastolic function is normal   •  Aortic Valve: The leaflets are moderately calcified  There is moderate to severe stenosis  Peak gradient 47 mmHg and mean gradient 23 mmHg  No major change compared to previous echocardiogram from April 2022  •  Mitral Valve: There is mild annular calcification  •  Aorta: The ascending aorta is mildly dilated  4 0 cm      Findings    Left Ventricle Left ventricular cavity size is normal  Wall thickness is normal  The left ventricular ejection fraction is 55%  Systolic function is normal   Wall motion is normal  Diastolic function is normal    Right Ventricle Right ventricular cavity size is normal  Systolic function is normal  Wall thickness is normal    Left Atrium The atrium is normal in size  Right Atrium The atrium is normal in size  Aortic Valve The aortic valve was not well visualized  The leaflets are moderately calcified  There is moderate to severe stenosis  Peak gradient 47 mmHg and mean gradient 23 mmHg    No major change compared to previous echocardiogram from April 2022   Mitral Valve There is mild annular calcification  There is trace regurgitation  There is no evidence of stenosis  Tricuspid Valve Tricuspid valve structure is normal  There is trace regurgitation  There is no evidence of stenosis  Pulmonic Valve Pulmonic valve structure is normal  There is trace regurgitation  There is no evidence of stenosis  Ascending Aorta The ascending aorta is mildly dilated  4 0 cm  Pericardium There is no pericardial effusion  The pericardium is normal in appearance       Left Ventricle Measurements    Function/Volumes   A4C EF 57 %         LVOT stroke volume 142 39 cm3         LVOT stroke volume index 57 9 ml/m2         Dimensions   LVIDd 5 4 cm         LVIDS 3 4 cm         IVSd 1 6 cm         LVPWd 1 5 cm         LVOT area 4 52 cm2         FS 37 %         Diastolic Filling   MV E' Tissue Velocity Septal 7 cm/s         E wave deceleration time 280 ms         MV Peak E Jules 108 cm/s         MV Peak A Jules 0 78 m/s          Report Measurements   AV LVOT peak gradient 6 mmHg              Interventricular Septum Measurements    Shunt Ratio   LVOT peak VTI 31 49 cm         LVOT peak jules 1 22 m/s              Right Ventricle Measurements    Dimensions   RVID d 3 7 cm               Left Atrium Measurements    Dimensions   LA size 4 7 cm         LA length (A2C) 6 1 cm               Right Atrium Measurements    Dimensions   RA 2D Volume 47 mL         RAA A4C 18 4 cm2               Atrial Septum Measurements    Shunt Ratio   LVOT peak VTI 31 49 cm         LVOT peak jules 1 22 m/s               Aortic Valve Measurements    Stenosis   Aortic valve peak velocity 3 31 m/s         LVOT peak jules 1 22 m/s         Ao VTI 83 08 cm         LVOT peak VTI 31 49 cm         AV mean gradient 23 mmHg         LVOT mn grad 3 mmHg         AV peak gradient 44 mmHg         AV LVOT peak gradient 6 mmHg         Area/Dimensions   DVI 0 37          AV valve area 1 71 cm2         AV area by cont VTI 1 7 cm2         AV area peak jules 1 7 cm2         LVOT diameter 2 4 cm         LVOT area 4 52 cm2               Mitral Valve Measurements    Stenosis   MV stenosis pressure 1/2 time 81 ms         MV valve area p 1/2 method 2 72 cm2               Aorta Measurements    Aortic Dimensions   Ao root 3 6 cm         Asc Ao 4 cm                   I have personally reviewed pertinent reports  and I have personally reviewed pertinent films in PACS    TAVR evaluation Assessment:     [address]: III    Aortic Stenosis Stage: D3    5 Meter Walk:   1  10 sec   2  10 sec   3  10 sec     STS risk score (preliminary): 2 6%    KCCQ-12 completed    Assessment:  Patient Active Problem List    Diagnosis Date Noted   • Unstable angina ([facility] [address] ) 02/22/2023   • Nonrheumatic mitral valve regurgitation 11/18/2021   • Obesity hypoventilation syndrome ([address] ) 09/23/2021   • Renal lesion 09/15/2021   • Pulmonary embolism ([address] ) 09/12/2021   • Coronary artery disease of native artery of native heart with stable angina pectoris ([facility] [address] ) 09/12/2021   • Chronic diastolic congestive heart failure ([facility] ) 09/12/2021   • Varicose veins of right lower extremity 09/12/2021   • Seasonal allergic rhinitis due to pollen 03/01/2019   • Chronic pain of both shoulders 04/25/2018   • Primary osteoarthritis of left knee 11/07/2017   • Nonrheumatic aortic valve stenosis 10/31/2017   • Hx of CABG 10/31/2017   • Pure hypercholesterolemia 08/03/2012   • Impaired fasting glucose 02/03/2012   • Depressive disorder 10/19/2009   • Morbid obesity ([facility] [address] ) 05/29/2007   • Obstructive sleep apnea 05/29/2007   • S/P angioplasty with stent 05/29/2007   • Venous insufficiency 05/29/2007   • Cor pulmonale, chronic ([facility] [address] ) 05/29/2007   • Bradycardia 05/29/2007   • Edema 06/21/2005     Severe aortic stenosis; Ongoing TAVR workup    Plan:    Otto Okeefe has symptomatic severe aortic stenosis   They will undergo the following testing for transcatheter aortic valve replacement: Gated CTA of the chest/abdomen/pelvis, 3D DEBBIE to better assess valve morphology and function, dental clearance, and carotid artery ultrasound  Once these studies have been completed, Otto Okeefe will follow up in our office to review the results and to be evaluated to confirm the suitability of proceeding with transcatheter aortic valve replacment  Otto Okeefe was comfortable with our recommendations, and their questions were answered to their satisfaction  Thank you for allowing us to participate in the care of this patient  Patient has active cologuard order in place from January 6, 2023    SIGNATURE: Shailesh Ledbetter PA-C  DATE: February 22, 2023  TIME: 3:51 PM  Attestation signed by Coco Thibodeaux DO at 2/22/2023  4:24 PM:  I supervised the Advanced Practitioner  ? I performed, in its entirety, the assessment/plan component of the visit  I agree with the Advanced Practitioner's note with the following additions/exceptions:      Mr Liliam Garcia was seen in the office today for evaluation of his severe aortic stenosis  His echocardiogram was reviewed by myself personally and findings shared with him  This demonstrates severe aortic stenosis  He has previously undergone CABG, and still has a patent REYNOLDS to LAD  The risks, benefits, and alternatives to TAVR been discussed with him  He consents and is willing to proceed with TAVR      Coco Thibodeaux DO 02/22/23

## 2023-02-23 ENCOUNTER — TELEPHONE (OUTPATIENT)
Dept: CARDIAC SURGERY | Facility: CLINIC | Age: 68
End: 2023-02-23

## 2023-02-23 ENCOUNTER — TELEPHONE (OUTPATIENT)
Dept: CARDIOLOGY CLINIC | Facility: CLINIC | Age: 68
End: 2023-02-23

## 2023-02-23 NOTE — TELEPHONE ENCOUNTER
Spoke with pt and Dr Cho Distance message was related  Pt verbally understood  And said that he has his records on My Chart

## 2023-02-23 NOTE — TELEPHONE ENCOUNTER
PATIENT CALLED STATANDRE THAT DENTIST CLINIC CANT GET HIM IN until 1 month advisd we could call and see if we can move anything sooner  Pt got upset at the wait time for dentist apt and states he wants to continue the surgery with [facility]   Adv he can give us a call if he changes his mind

## 2023-03-06 ENCOUNTER — TELEPHONE (OUTPATIENT)
Dept: PAIN MEDICINE | Facility: MEDICAL CENTER | Age: 68
End: 2023-03-06

## 2023-03-06 NOTE — TELEPHONE ENCOUNTER
Caller: Ana Laura Miranda     Doctor/Office: Dr Ricky Peoples: [phone]        Patient is requesting a transfer of care for the following reason: Location         Doctor: Dr Omar Thomas     Would you release patient from your care? Doctor: Dr Jovita Antonio    Would you take patient on as a patient? Please advise,   Thank you

## 2023-03-07 DIAGNOSIS — G57.91 NEUROPATHY OF RIGHT LOWER EXTREMITY: ICD-10-CM

## 2023-03-07 RX ORDER — LIDOCAINE 50 MG/G
PATCH TOPICAL
Qty: 60 PATCH | Refills: 0 | Status: SHIPPED | OUTPATIENT
Start: 2023-03-07

## 2023-03-08 NOTE — TELEPHONE ENCOUNTER
Caller: patient  Doctor: Kay Estrella    Reason for call: pt called stating he does not want to transfer to [facility]  He goes to [employer] [facility] to orthopedic and would like to see Dr Rodger Mora      Call back#: [phone]

## 2023-03-08 NOTE — TELEPHONE ENCOUNTER
Caller: Patient    Doctor/Office: tommy    Call regarding :  Update on previous request     Call was transferred to: [facility]

## 2023-04-27 ENCOUNTER — OFFICE VISIT (OUTPATIENT)
Dept: PAIN MEDICINE | Facility: MEDICAL CENTER | Age: 68
End: 2023-04-27

## 2023-04-27 VITALS
SYSTOLIC BLOOD PRESSURE: 135 MMHG | WEIGHT: 298 LBS | HEART RATE: 48 BPM | DIASTOLIC BLOOD PRESSURE: 77 MMHG | OXYGEN SATURATION: 96 % | HEIGHT: 69 IN | BODY MASS INDEX: 44.14 KG/M2

## 2023-04-27 DIAGNOSIS — M17.0 PRIMARY OSTEOARTHRITIS OF BOTH KNEES: Primary | ICD-10-CM

## 2023-04-27 RX ORDER — FLUTICASONE PROPIONATE 50 MCG
SPRAY, SUSPENSION (ML) NASAL
COMMUNITY
Start: 2022-12-12

## 2023-04-27 RX ORDER — AZITHROMYCIN 250 MG/1
TABLET, FILM COATED ORAL
COMMUNITY
Start: 2023-03-20

## 2023-04-27 NOTE — PROGRESS NOTES
Assessment:  1  Primary osteoarthritis of both knees        Plan:  1  At this time we will schedule the patient for bilateral genicular nerve blocks under fluoroscopic guidance  If this provides significant benefit he will be a candidate for pursuing radiofrequency ablation at this level as well  My impressions and treatment recommendations were discussed in detail with the patient who verbalized understanding and had no further questions  Discharge instructions were provided  I personally saw and examined the patient and I agree with the above discussed plan of care  Orders Placed This Encounter   Procedures   • FL spine and pain procedure     Standing Status:   Future     Standing Expiration Date:   4/27/2024     Order Specific Question:   Reason for Exam:     Answer:   (B) geniculate nerve blocks     Order Specific Question:   Anticoagulant hold needed? Answer:   no     New Medications Ordered This Visit   Medications   • azithromycin (ZITHROMAX) 250 mg tablet     Sig: TAKE 2 TABLETS BY MOUTH TODAY, THEN TAKE 1 TABLET DAILY FOR 4 DAYS   • azithromycin (ZITHROMAX) 250 mg tablet     Sig: Take 2 tablets by mouth on day one; then one tablet daily for 4 days  • fluticasone (FLONASE) 50 mcg/act nasal spray     Sig: SPRAY 1 SPRAY INTO EACH NOSTRIL EVERY DAY       History of Present Illness:  Otto Avina is a 79 y o  male who presents for a follow up office visit in regards to Knee Pain (Bilateral Knee Pain )  The patient’s current symptoms include Severe chronic knee pain currently rated as an 8/10 which is worsening with time  He has undergone conservative care including intra-articular injections with corticosteroids as well as Visco supplement  He only gets about 8 to 10 days of relief with steroid injections and did not respond to viscosupplement injections      He is currently describing constant pain throughout the entirety of the day described as a dull aching sensation as well as pins-and-needles in the knees  Medications only provide about 5% relief of his symptoms and he is experiencing moderate to severe functional deficits as a result of this with decreased ability to walk, decreased range of motion and joint stiffness and pain is noted  I have personally reviewed and/or updated the patient's past medical history, past surgical history, family history, social history, current medications, allergies, and vital signs today  Review of Systems   Constitutional: Positive for activity change  Negative for chills and fever  HENT: Negative for ear pain and sore throat  Eyes: Negative for pain and visual disturbance  Respiratory: Negative for cough and shortness of breath  Cardiovascular: Negative for chest pain and palpitations  Gastrointestinal: Negative for abdominal pain and vomiting  Genitourinary: Negative for dysuria and hematuria  Musculoskeletal: Positive for gait problem  Negative for arthralgias and back pain  Skin: Negative for color change and rash  Neurological: Positive for weakness  Negative for seizures and syncope  All other systems reviewed and are negative        Patient Active Problem List   Diagnosis   • Pulmonary embolism (HCC)   • Coronary artery disease of native artery of native heart with stable angina pectoris ([facility])   • Chronic diastolic congestive heart failure ([address] )   • Varicose veins of right lower extremity   • Renal lesion   • Obesity hypoventilation syndrome ([address] )   • Morbid obesity ([address] )   • Nonrheumatic aortic valve stenosis   • Obstructive sleep apnea   • Primary osteoarthritis of left knee   • Pure hypercholesterolemia   • S/P angioplasty with stent   • Seasonal allergic rhinitis due to pollen   • Venous insufficiency   • Impaired fasting glucose   • Hx of CABG   • Edema   • Depressive disorder   • Cor pulmonale, chronic (HCC)   • Chronic pain of both shoulders   • Bradycardia   • Nonrheumatic mitral valve regurgitation   • Unstable angina [facility])       Past Medical History:   Diagnosis Date   • Arthritis 2008   • Cough    • Heart disease 2000   • HTN (hypertension)    • Hyperlipidemia    • Hypertension 2000   • Osteoarthritis 2002   • Sleep apnea, obstructive    • SOB (shortness of breath)    • Wheezing        Past Surgical History:   Procedure Laterality Date   • CARDIAC CATHETERIZATION Left 1/27/2023    Procedure: Cardiac Left Heart Cath;  Surgeon: Ora Wolfe MD;  Location: [address] [place] [address] CATH LAB; Service: Cardiology   • CARDIAC CATHETERIZATION  1/27/2023    Procedure: Cardiac catheterization;  Surgeon: Ora Wolfe MD;  Location: [address] [place] [address] CATH LAB; Service: Cardiology   • CORONARY ANGIOPLASTY WITH STENT PLACEMENT N/A    • CORONARY ARTERY BYPASS GRAFT      12 years ago   • KNEE CARTILAGE SURGERY Left     left knee meniscus repair       Family History   Problem Relation Age of Onset   • Heart disease Father    • Arthritis Father    • Hypertension Father    • Stroke Father    • Arthritis Paternal Grandfather        Social History     Occupational History   • Occupation: employed   Tobacco Use   • Smoking status: Never   • Smokeless tobacco: Never   • Tobacco comments:     Never Used   Vaping Use   • Vaping Use: Never used   Substance and Sexual Activity   • Alcohol use:  Yes     Alcohol/week: 7 0 standard drinks     Types: 5 Cans of beer, 2 Shots of liquor per week     Comment: minor   • Drug use: Never   • Sexual activity: Not Currently     Partners: Female     Birth control/protection: Female Sterilization       Current Outpatient Medications on File Prior to Visit   Medication Sig   • Ascorbic Acid (VITAMIN C PO) Take 100 mg by mouth daily   • atorvastatin (LIPITOR) 40 mg tablet TAKE 1 TABLET BY MOUTH DAILY WITH DINNER   • buPROPion (WELLBUTRIN XL) 150 mg 24 hr tablet TAKE 1 TABLET BY MOUTH EVERY DAY IN THE MORNING   • carvedilol (COREG) 25 mg tablet TAKE 1 TABLET BY MOUTH TWICE A DAY WITH FOOD   • clopidogrel (PLAVIX) 75 "mg tablet TAKE 1 TABLET BY MOUTH EVERY DAY   • DULoxetine (CYMBALTA) 60 mg delayed release capsule TAKE 1 CAPSULE BY MOUTH EVERY DAY   • furosemide (LASIX) 40 mg tablet Take 1 tablet (40 mg total) by mouth daily Do not start before January 28, 2023  • gabapentin (NEURONTIN) 400 mg capsule TAKE 1 CAPSULE BY MOUTH 2 TIMES A DAY  • isosorbide mononitrate (IMDUR) 30 mg 24 hr tablet Take 1 tablet (30 mg total) by mouth daily   • lidocaine (Lidoderm) 5 % Remove & Discard patch within 12 hours or as directed by MD Use 1 patch in each knee daily   • losartan (COZAAR) 100 MG tablet TAKE 1 TABLET BY MOUTH EVERY DAY   • Omega-3 Fatty Acids (FISH OIL PO) Take 1,000 mg by mouth daily   • ranolazine (RANEXA) 1000 MG SR tablet TAKE 1 TABLET (1,000 MG TOTAL) BY MOUTH EVERY 12 (TWELVE) HOURS   • ZINC GLUCONATE PO Take 50 mg by mouth daily   • azithromycin (ZITHROMAX) 250 mg tablet TAKE 2 TABLETS BY MOUTH TODAY, THEN TAKE 1 TABLET DAILY FOR 4 DAYS (Patient not taking: Reported on 4/27/2023)   • azithromycin (ZITHROMAX) 250 mg tablet Take 2 tablets by mouth on day one; then one tablet daily for 4 days  (Patient not taking: Reported on 4/27/2023)   • fluticasone (FLONASE) 50 mcg/act nasal spray SPRAY 1 SPRAY INTO EACH NOSTRIL EVERY DAY (Patient not taking: Reported on 4/27/2023)   • nitroglycerin (NITROSTAT) 0 4 mg SL tablet Place 1 tablet (0 4 mg total) under the tongue every 5 (five) minutes as needed for chest pain (Patient not taking: Reported on 2/22/2023)     No current facility-administered medications on file prior to visit         Allergies   Allergen Reactions   • Medical Tape Other (See Comments) and Rash   • Sulfa Antibiotics Other (See Comments)   • Fosinopril Cough     Cough     • Lisinopril Cough   • Wound Dressing Adhesive Dermatitis and Rash       Physical Exam:    /77   Pulse (!) 48   Ht 5' 9\" (1 753 m)   Wt 135 kg (298 lb)   SpO2 96%   BMI 44 01 kg/m²     Constitutional:normal, well developed, well " nourished, alert, in no distress and non-toxic and no overt pain behavior  Eyes:anicteric  HEENT:grossly intact  Neck: symmetric, trachea midline and no masses   Pulmonary:even and unlabored  Cardiovascular:No edema or pitting edema present  Skin:Normal without rashes or lesions and well hydrated  Psychiatric:Mood and affect appropriate  Neurologic:Cranial Nerves II-XII grossly intact  Musculoskeletal:normal, except for significant tenderness to palpation along the medial joint line bilaterally reproducing pain complaint, he does have limitation in full range of motion both in extension and flexion bilaterally, this also reproduces pain    Imaging  Left knee xray  VIEWS:  XR KNEE 4+ VW LEFT INJURY         FINDINGS:     There is no acute fracture or dislocation      There is a small joint effusion      Severe tricompartmental osteoarthritis as evidenced by joint space narrowing, osteophyte formation and subchondral sclerosis  Varus deformity with subluxation      No lytic or blastic osseous lesion      Soft tissues are unremarkable      IMPRESSION:     No acute osseous abnormality            Workstation performed: [id number]     Right knee xray  VIEWS:  XR KNEE 4+ VW RIGHT INJURY         FINDINGS:     There is no acute fracture or dislocation      There is no joint effusion      Severe tricompartmental osteoarthritis as evidenced by joint space narrowing, osteophyte formation and subchondral sclerosis  Varus deformity    Calcification posteriorly measuring 1 6 cm may suggest loose body     No lytic or blastic osseous lesion      Soft tissues are unremarkable      IMPRESSION:     No acute osseous abnormality            Workstation performed: [id number]

## 2023-04-27 NOTE — PROGRESS NOTES
Assessment  No diagnosis found  Plan  ***    {Oral Swab Statement:93025}    {Opioid Statement:91057}    {UDS Statement:84854}    {PDMP Statement:01756}    {Pain Management Procedure Statement:90753}    My impressions and treatment recommendations were discussed in detail with the patient who verbalized understanding and had no further questions  Discharge instructions were provided  I personally saw and examined the patient and I agree with the above discussed plan of care  No orders of the defined types were placed in this encounter  New Medications Ordered This Visit   Medications   • azithromycin (ZITHROMAX) 250 mg tablet     Sig: TAKE 2 TABLETS BY MOUTH TODAY, THEN TAKE 1 TABLET DAILY FOR 4 DAYS   • azithromycin (ZITHROMAX) 250 mg tablet     Sig: Take 2 tablets by mouth on day one; then one tablet daily for 4 days  • fluticasone (FLONASE) 50 mcg/act nasal spray     Sig: SPRAY 1 SPRAY INTO EACH NOSTRIL EVERY DAY       History of Present Illness    Otto Nails is a 79 y o  male ***    I have personally reviewed and/or updated the patient's past medical history, past surgical history, family history, social history, current medications, allergies, and vital signs today  Review of Systems   Constitutional: Positive for activity change  Negative for chills and fever  HENT: Negative for ear pain and sore throat  Eyes: Negative for pain and visual disturbance  Respiratory: Negative for cough and shortness of breath  Cardiovascular: Negative for chest pain and palpitations  Gastrointestinal: Negative for abdominal pain and vomiting  Genitourinary: Negative for dysuria and hematuria  Musculoskeletal: Positive for gait problem  Negative for arthralgias and back pain  Skin: Negative for color change and rash  Neurological: Positive for weakness  Negative for seizures and syncope  Psychiatric/Behavioral: Positive for sleep disturbance     All other systems reviewed and are negative  Patient Active Problem List   Diagnosis   • Pulmonary embolism ([facility] )   • Coronary artery disease of native artery of native heart with stable angina pectoris ([facility] )   • Chronic diastolic congestive heart failure ([facility] )   • Varicose veins of right lower extremity   • Renal lesion   • Obesity hypoventilation syndrome ([facility] )   • Morbid obesity ([facility] )   • Nonrheumatic aortic valve stenosis   • Obstructive sleep apnea   • Primary osteoarthritis of left knee   • Pure hypercholesterolemia   • S/P angioplasty with stent   • Seasonal allergic rhinitis due to pollen   • Venous insufficiency   • Impaired fasting glucose   • Hx of CABG   • Edema   • Depressive disorder   • Cor pulmonale, chronic (HCC)   • Chronic pain of both shoulders   • Bradycardia   • Nonrheumatic mitral valve regurgitation   • Unstable angina ([facility])       Past Medical History:   Diagnosis Date   • Arthritis 2008   • Cough    • Heart disease 2000   • HTN (hypertension)    • Hyperlipidemia    • Hypertension 2000   • Osteoarthritis 2002   • Sleep apnea, obstructive    • SOB (shortness of breath)    • Wheezing        Past Surgical History:   Procedure Laterality Date   • CARDIAC CATHETERIZATION Left 1/27/2023    Procedure: Cardiac Left Heart Cath;  Surgeon: Isadora Chowdhury MD;  Location: [address] [place] [address] CATH LAB; Service: Cardiology   • CARDIAC CATHETERIZATION  1/27/2023    Procedure: Cardiac catheterization;  Surgeon: Isadora Chowdhury MD;  Location: [address] [place] [address] CATH LAB;   Service: Cardiology   • CORONARY ANGIOPLASTY WITH STENT PLACEMENT N/A    • CORONARY ARTERY BYPASS GRAFT      12 years ago   • KNEE CARTILAGE SURGERY Left     left knee meniscus repair       Family History   Problem Relation Age of Onset   • Heart disease Father    • Arthritis Father    • Hypertension Father    • Stroke Father    • Arthritis Paternal Grandfather        Social History     Occupational History   • Occupation: employed   Tobacco Use   • Smoking status: Never   • Smokeless tobacco: Never   • Tobacco comments:     Never Used   Vaping Use   • Vaping Use: Never used   Substance and Sexual Activity   • Alcohol use: Yes     Alcohol/week: 7 0 standard drinks     Types: 5 Cans of beer, 2 Shots of liquor per week     Comment: minor   • Drug use: Never   • Sexual activity: Not Currently     Partners: Female     Birth control/protection: Female Sterilization       Current Outpatient Medications on File Prior to Visit   Medication Sig   • Ascorbic Acid (VITAMIN C PO) Take 100 mg by mouth daily   • atorvastatin (LIPITOR) 40 mg tablet TAKE 1 TABLET BY MOUTH DAILY WITH DINNER   • buPROPion (WELLBUTRIN XL) 150 mg 24 hr tablet TAKE 1 TABLET BY MOUTH EVERY DAY IN THE MORNING   • carvedilol (COREG) 25 mg tablet TAKE 1 TABLET BY MOUTH TWICE A DAY WITH FOOD   • clopidogrel (PLAVIX) 75 mg tablet TAKE 1 TABLET BY MOUTH EVERY DAY   • DULoxetine (CYMBALTA) 60 mg delayed release capsule TAKE 1 CAPSULE BY MOUTH EVERY DAY   • furosemide (LASIX) 40 mg tablet Take 1 tablet (40 mg total) by mouth daily Do not start before January 28, 2023  • gabapentin (NEURONTIN) 400 mg capsule TAKE 1 CAPSULE BY MOUTH 2 TIMES A DAY  • isosorbide mononitrate (IMDUR) 30 mg 24 hr tablet Take 1 tablet (30 mg total) by mouth daily   • lidocaine (Lidoderm) 5 % Remove & Discard patch within 12 hours or as directed by MD Use 1 patch in each knee daily   • losartan (COZAAR) 100 MG tablet TAKE 1 TABLET BY MOUTH EVERY DAY   • Omega-3 Fatty Acids (FISH OIL PO) Take 1,000 mg by mouth daily   • ranolazine (RANEXA) 1000 MG SR tablet TAKE 1 TABLET (1,000 MG TOTAL) BY MOUTH EVERY 12 (TWELVE) HOURS   • ZINC GLUCONATE PO Take 50 mg by mouth daily   • azithromycin (ZITHROMAX) 250 mg tablet TAKE 2 TABLETS BY MOUTH TODAY, THEN TAKE 1 TABLET DAILY FOR 4 DAYS (Patient not taking: Reported on 4/27/2023)   • azithromycin (ZITHROMAX) 250 mg tablet Take 2 tablets by mouth on day one; then one tablet daily for 4 days   (Patient not taking: Reported on "2023)   • fluticasone (FLONASE) 50 mcg/act nasal spray SPRAY 1 SPRAY INTO EACH NOSTRIL EVERY DAY (Patient not taking: Reported on 2023)   • nitroglycerin (NITROSTAT) 0 4 mg SL tablet Place 1 tablet (0 4 mg total) under the tongue every 5 (five) minutes as needed for chest pain (Patient not taking: Reported on 2023)     No current facility-administered medications on file prior to visit  Allergies   Allergen Reactions   • Medical Tape Other (See Comments) and Rash   • Sulfa Antibiotics Other (See Comments)   • Fosinopril Cough     Cough     • Lisinopril Cough   • Wound Dressing Adhesive Dermatitis and Rash       Physical Exam    /77   Pulse (!) 48   Ht 5' 9\" (1 753 m)   Wt 135 kg (298 lb)   SpO2 96%   BMI 44 01 kg/m²     Constitutional: {General Appearance:30736::\"normal, well developed, well nourished, alert, in no distress and non-toxic and no overt pain behavior  \"}  Eyes: {Sclera:81129::\"anicteric\"}  HEENT: {Hearin::\"grossly intact\"}  Neck: {Neck:15429::\"supple, symmetric, trachea midline and no masses \"}  Pulmonary:{Respiratory effort:14740::\"even and unlabored\"}  Cardiovascular:{Examination of Extremities:74392::\"No edema or pitting edema present\"}  Skin:{Skin and Subcutaneous tissues:07167::\"Normal without rashes or lesions and well hydrated\"}  Psychiatric:{Mood and Affect:18515::\"Mood and affect appropriate\"}  Neurologic:{Cranial Nerves:89253::\"Cranial Nerves II-XII grossly intact\"}  Musculoskeletal:{Gait and Station:22930::\"normal\"}    Imaging  Left knee xray  VIEWS:  XR KNEE 4+ VW LEFT INJURY         FINDINGS:     There is no acute fracture or dislocation      There is a small joint effusion      Severe tricompartmental osteoarthritis as evidenced by joint space narrowing, osteophyte formation and subchondral sclerosis    Varus deformity with subluxation      No lytic or blastic osseous lesion      Soft tissues are unremarkable      IMPRESSION:     No acute osseous " abnormality            Workstation performed: [id number]     Right knee xray  VIEWS:  XR KNEE 4+ VW RIGHT INJURY         FINDINGS:     There is no acute fracture or dislocation      There is no joint effusion      Severe tricompartmental osteoarthritis as evidenced by joint space narrowing, osteophyte formation and subchondral sclerosis  Varus deformity    Calcification posteriorly measuring 1 6 cm may suggest loose body     No lytic or blastic osseous lesion      Soft tissues are unremarkable      IMPRESSION:     No acute osseous abnormality            Workstation performed: [id number]

## 2023-05-17 ENCOUNTER — HOSPITAL ENCOUNTER (OUTPATIENT)
Dept: RADIOLOGY | Facility: MEDICAL CENTER | Age: 68
Discharge: HOME | End: 2023-05-17

## 2023-05-17 VITALS
OXYGEN SATURATION: 95 % | DIASTOLIC BLOOD PRESSURE: 75 MMHG | TEMPERATURE: 98.4 F | HEART RATE: 63 BPM | RESPIRATION RATE: 18 BRPM | SYSTOLIC BLOOD PRESSURE: 166 MMHG

## 2023-05-17 DIAGNOSIS — M17.0 PRIMARY OSTEOARTHRITIS OF BOTH KNEES: ICD-10-CM

## 2023-05-17 RX ADMIN — Medication 3 ML: at 14:08

## 2023-05-17 NOTE — DISCHARGE INSTRUCTIONS

## 2023-05-17 NOTE — H&P
History of Present Illness: The patient is a 79 y o  male who presents with complaints of bilateral knee pain    Past Medical History:   Diagnosis Date   • Arthritis 2008   • Cough    • Heart disease 2000   • HTN (hypertension)    • Hyperlipidemia    • Hypertension 2000   • Osteoarthritis 2002   • Sleep apnea, obstructive    • SOB (shortness of breath)    • Wheezing        Past Surgical History:   Procedure Laterality Date   • CARDIAC CATHETERIZATION Left 1/27/2023    Procedure: Cardiac Left Heart Cath;  Surgeon: Reinaldo Sandy MD;  Location: [address] [place] [address] CATH LAB; Service: Cardiology   • CARDIAC CATHETERIZATION  1/27/2023    Procedure: Cardiac catheterization;  Surgeon: eRinaldo Sandy MD;  Location: [address] [place] [address] CATH LAB;   Service: Cardiology   • CORONARY ANGIOPLASTY WITH STENT PLACEMENT N/A    • CORONARY ARTERY BYPASS GRAFT      12 years ago   • KNEE CARTILAGE SURGERY Left     left knee meniscus repair         Current Outpatient Medications:   •  Ascorbic Acid (VITAMIN C PO), Take 100 mg by mouth daily, Disp: , Rfl:   •  atorvastatin (LIPITOR) 40 mg tablet, TAKE 1 TABLET BY MOUTH DAILY WITH DINNER, Disp: 90 tablet, Rfl: 3  •  buPROPion (WELLBUTRIN XL) 150 mg 24 hr tablet, TAKE 1 TABLET BY MOUTH EVERY DAY IN THE MORNING, Disp: 90 tablet, Rfl: 2  •  carvedilol (COREG) 25 mg tablet, TAKE 1 TABLET BY MOUTH TWICE A DAY WITH FOOD, Disp: 180 tablet, Rfl: 3  •  clopidogrel (PLAVIX) 75 mg tablet, TAKE 1 TABLET BY MOUTH EVERY DAY, Disp: 90 tablet, Rfl: 3  •  DULoxetine (CYMBALTA) 60 mg delayed release capsule, TAKE 1 CAPSULE BY MOUTH EVERY DAY, Disp: 90 capsule, Rfl: 3  •  fluticasone (FLONASE) 50 mcg/act nasal spray, SPRAY 1 SPRAY INTO EACH NOSTRIL EVERY DAY (Patient not taking: Reported on 4/27/2023), Disp: , Rfl:   •  furosemide (LASIX) 40 mg tablet, Take 1 tablet (40 mg total) by mouth daily Do not start before January 28, 2023 , Disp: 90 tablet, Rfl: 0  •  gabapentin (NEURONTIN) 400 mg capsule, TAKE 1 CAPSULE BY MOUTH 2 TIMES A DAY , Disp: 180 capsule, Rfl: 3  •  isosorbide mononitrate (IMDUR) 30 mg 24 hr tablet, Take 1 tablet (30 mg total) by mouth daily, Disp: 90 tablet, Rfl: 3  •  lidocaine (Lidoderm) 5 %, Remove & Discard patch within 12 hours or as directed by MD Use 1 patch in each knee daily, Disp: 60 patch, Rfl: 0  •  losartan (COZAAR) 100 MG tablet, TAKE 1 TABLET BY MOUTH EVERY DAY, Disp: 90 tablet, Rfl: 3  •  nitroglycerin (NITROSTAT) 0 4 mg SL tablet, Place 1 tablet (0 4 mg total) under the tongue every 5 (five) minutes as needed for chest pain (Patient not taking: Reported on 2/22/2023), Disp: 50 tablet, Rfl: 2  •  Omega-3 Fatty Acids (FISH OIL PO), Take 1,000 mg by mouth daily, Disp: , Rfl:   •  ranolazine (RANEXA) 1000 MG SR tablet, TAKE 1 TABLET (1,000 MG TOTAL) BY MOUTH EVERY 12 (TWELVE) HOURS, Disp: 180 tablet, Rfl: 2  •  ZINC GLUCONATE PO, Take 50 mg by mouth daily, Disp: , Rfl:     Current Facility-Administered Medications:   •  lidocaine (PF) (XYLOCAINE-MPF) 2 % injection 3 mL, 3 mL, Perineural, Once, Radha Zepeda, DO    Allergies   Allergen Reactions   • Medical Tape Other (See Comments) and Rash   • Sulfa Antibiotics Other (See Comments)   • Fosinopril Cough     Cough     • Lisinopril Cough   • Wound Dressing Adhesive Dermatitis and Rash       Physical Exam:   Vitals:    05/17/23 1358   BP: 153/77   Pulse: 57   Resp: 18   Temp: 98 4 °F (36 9 °C)   SpO2: 94%     General: Awake, Alert, Oriented x 3, Mood and affect appropriate  Respiratory: Respirations even and unlabored  Cardiovascular: Peripheral pulses intact; bilateral lower extremity edema  Musculoskeletal Exam: bilateral knee pain    ASA Score: 3    Patient/Chart Verification  Patient ID Verified: Verbal  Consents Confirmed: To be obtained in the Pre-Procedure area, Procedural  H&P( within 30 days) Verified: To be obtained in the Pre-Procedure area  Allergies Reviewed:  Yes  Anticoag/NSAID held?: NA  Currently on antibiotics?: No  Pregnancy denied?: NA    Assessment:   1   Primary osteoarthritis of both knees        Plan: (B) geniculate nerve blocks

## 2023-05-30 DIAGNOSIS — G57.91 NEUROPATHY OF RIGHT LOWER EXTREMITY: ICD-10-CM

## 2023-05-30 RX ORDER — LIDOCAINE 50 MG/G
PATCH TOPICAL
Qty: 60 PATCH | Refills: 0 | Status: SHIPPED | OUTPATIENT
Start: 2023-05-30

## 2023-06-01 ENCOUNTER — TELEPHONE (OUTPATIENT)
Dept: PAIN MEDICINE | Facility: MEDICAL CENTER | Age: 68
End: 2023-06-01

## 2023-06-01 ENCOUNTER — TELEPHONE (OUTPATIENT)
Dept: RADIOLOGY | Facility: MEDICAL CENTER | Age: 68
End: 2023-06-01

## 2023-06-01 NOTE — TELEPHONE ENCOUNTER
Caller: patient    Doctor: Liana Caal    Reason for call: pt calling to check if Dr Liana Caal reviewed after procedure questionnaire.  Please reach out to him    Call back#: [phone]

## 2023-06-01 NOTE — TELEPHONE ENCOUNTER
S/w pt. Pt advised we did receive his pain diary and will be in touch as soon as Dr Kendra Cox has a chance to review it. Pt verbalized understanding and appreciation.

## 2023-06-01 NOTE — TELEPHONE ENCOUNTER
"----- Message from Mildred Hilton DO sent at 6/1/2023  3:25 PM EDT -----  Regarding: FW: Follow up question form  for Knee Pain Procedure  Contact: [phone]  Please schedule RFA (one side at a time)    ----- Message -----  From: Joe Verduzco RN  Sent: 6/1/2023   7:57 AM EDT  To: Mildred Hilton DO  Subject: FW: Follow up question form  for Knee Pain P#      ----- Message -----  From: Mike Olguin \"Carlos Alberto\"  Sent: 5/31/2023  10:23 PM EDT  To: [facility]  Subject: Follow up question form  for Knee Pain Proce#    Good Day Please find the Attached PDF Scan of the questionnaire  from my Visit on 05-  Please let me know if you have any questions for me    I would like to Schedule an appointment As Soon as possible to continue the Procedure for the Nerve Ablation in both Knees  Thank you   [employer] , Cell # [phone]      "

## 2023-06-02 ENCOUNTER — TELEPHONE (OUTPATIENT)
Dept: RADIOLOGY | Facility: MEDICAL CENTER | Age: 68
End: 2023-06-02

## 2023-06-02 NOTE — TELEPHONE ENCOUNTER
Pain diary reviewed by Dr Lopez Sample; proceed with RFA and f/u; I will call and coordinate       B/L Geniculate Nerve RFA

## 2023-06-02 NOTE — TELEPHONE ENCOUNTER
Reviewed instructions: , NPO 1 hour prior, loose-fitting/comfortable clothing, if ill/fever/infx/abx to call and reschedule  No need to hold any meds prior  Patient stated verbal understanding       Procedure schedule 6/20/23 arrive at 1120  Procedure schedule 7/5/23 arrive at 1120  F/U scheduled 8/2/23 1030am

## 2023-06-06 ENCOUNTER — TELEPHONE (OUTPATIENT)
Dept: RADIOLOGY | Facility: MEDICAL CENTER | Age: 68
End: 2023-06-06

## 2023-06-06 NOTE — TELEPHONE ENCOUNTER
Patient with [employer]  Patient had bilateral geniculate NB on 5/17  Scheduled for right and left RFA on 6/20 and 7/5  Per Oliver Woodruff at [place], [place] [address], geniculate RFA is not a valid code and it has been deleted from their code Hanna [address]man [address]  It is not a covered service because the code is not valid  I inquired about CPT 95039 the blocks as I was told that this was covered  Oliver Woodruff informed me that 24519 IS a valid and billable code however the ablation is not  Please advise what can offer this patient  I can provide him with the information for [facility] if he wishes to proceed as scheduled       Call ref # [id number]

## 2023-06-15 NOTE — TELEPHONE ENCOUNTER
Patient and his wife called me  Inquiring about if we coded it differently, would it be paid  31123 CPT is specific for the geniculate RFA  It cannot be a general code as biling would change it into the appropriate code and it would then be not paid  He did inquire about pricing and they will call [facility] and let me know if he wishes to proceed  I took him off the schedule but I will hold those appt slots and not schedule anyone until he lets me know his decision after checking with [facility]

## 2023-06-15 NOTE — TELEPHONE ENCOUNTER
I spoke with patient and explained that R will not cover the geniculate RFA  He understood  He has done knee injections in the past with only a few weeks of relief  And he has seen ortho and currently his BMI is too high for surgery  He will consider switching insurance to [employer] medicare and if he does, he will call me  At present time, [facility] is covering geniculates  He did not wish to inquire with [facility]

## 2023-06-16 NOTE — TELEPHONE ENCOUNTER
Patient called and stated he called [facility] @ [phone] and they stated that they have no record of pre auth and that 63671 nerve block is on the books  They are saying they have no record of this pre auth being denied  No appeal is needed  Patients reference # [id number]

## 2023-06-27 DIAGNOSIS — I50.32 CHRONIC DIASTOLIC CONGESTIVE HEART FAILURE (HCC): ICD-10-CM

## 2023-06-27 RX ORDER — FUROSEMIDE 40 MG/1
TABLET ORAL
Qty: 90 TABLET | Refills: 0 | Status: SHIPPED | OUTPATIENT
Start: 2023-06-27

## 2023-06-27 NOTE — TELEPHONE ENCOUNTER
Name from pharmacy: FUROSEMIDE 40 [address] [facility] [place]          Will file in chart as: furosemide (LASIX) 40 mg tablet    Sig: TAKE 1 TABLET BY MOUTH DAILY DO NOT START BEFORE JANUARY 28, 2023  Disp:  90 tablet    Refills:  0 (Pharmacy requested: Not specified)    Start: 6/27/2023    Class: Normal    Non-formulary For: Chronic diastolic congestive heart failure ([address] )    Last ordered: 5 months ago (1/27/2023) by Anastasiya Miramontes PA-C    Last refill: 3/28/2023    Rx #: [id number]    Cardiovascular:  Diuretics - Loop Failed 06/27/2023 01:25 AM   Protocol Details  Valid encounter within last 6 months    BP completed in the last 6 months    K in normal range and within 360 days    Na in normal range and within 360 days    eGFR is 60 or above and within 360 days      To be filled at: [id number] [place] - [address]  [facility]    Please review and refill if possible  Thanks!

## 2023-07-05 ENCOUNTER — HOSPITAL ENCOUNTER (OUTPATIENT)
Dept: RADIOLOGY | Facility: MEDICAL CENTER | Age: 68
Discharge: HOME | End: 2023-07-05
Admitting: PHYSICAL MEDICINE & REHABILITATION
Payer: COMMERCIAL

## 2023-07-05 ENCOUNTER — TELEPHONE (OUTPATIENT)
Dept: PAIN MEDICINE | Facility: MEDICAL CENTER | Age: 68
End: 2023-07-05

## 2023-07-05 VITALS
HEART RATE: 60 BPM | DIASTOLIC BLOOD PRESSURE: 68 MMHG | SYSTOLIC BLOOD PRESSURE: 137 MMHG | OXYGEN SATURATION: 90 % | TEMPERATURE: 98.4 F | RESPIRATION RATE: 18 BRPM

## 2023-07-05 DIAGNOSIS — M17.0 PRIMARY OSTEOARTHRITIS OF BOTH KNEES: ICD-10-CM

## 2023-07-05 PROCEDURE — 64624 DSTRJ NULYT AGT GNCLR NRV: CPT | Performed by: PHYSICAL MEDICINE & REHABILITATION

## 2023-07-05 RX ORDER — BUPIVACAINE HCL/PF 2.5 MG/ML
5 VIAL (ML) INJECTION ONCE
Status: COMPLETED | OUTPATIENT
Start: 2023-07-05 | End: 2023-07-05

## 2023-07-05 RX ADMIN — Medication 5 ML: at 11:06

## 2023-07-05 NOTE — DISCHARGE INSTRUCTIONS

## 2023-07-05 NOTE — H&P
History of Present Illness: The patient is a 79 y.o. male who presents with complaints of right knee pain    Past Medical History:   Diagnosis Date   • Arthritis 2008   • Cough    • Heart disease 2000   • HTN (hypertension)    • Hyperlipidemia    • Hypertension 2000   • Osteoarthritis 2002   • Sleep apnea, obstructive    • SOB (shortness of breath)    • Wheezing        Past Surgical History:   Procedure Laterality Date   • CARDIAC CATHETERIZATION Left 1/27/2023    Procedure: Cardiac Left Heart Cath;  Surgeon: Willy Cartagena MD;  Location: [address] [facility] CATH LAB; Service: Cardiology   • CARDIAC CATHETERIZATION  1/27/2023    Procedure: Cardiac catheterization;  Surgeon: Willy Cartagena MD;  Location: [address] CATH LAB; Service: Cardiology   • CORONARY ANGIOPLASTY WITH STENT PLACEMENT N/A    • CORONARY ARTERY BYPASS GRAFT      12 years ago   • KNEE CARTILAGE SURGERY Left     left knee meniscus repair         Current Outpatient Medications:   •  Ascorbic Acid (VITAMIN C PO), Take 100 mg by mouth daily, Disp: , Rfl:   •  atorvastatin (LIPITOR) 40 mg tablet, TAKE 1 TABLET BY MOUTH DAILY WITH DINNER, Disp: 90 tablet, Rfl: 3  •  buPROPion (WELLBUTRIN XL) 150 mg 24 hr tablet, TAKE 1 TABLET BY MOUTH EVERY DAY IN THE MORNING, Disp: 90 tablet, Rfl: 2  •  carvedilol (COREG) 25 mg tablet, TAKE 1 TABLET BY MOUTH TWICE A DAY WITH FOOD, Disp: 180 tablet, Rfl: 3  •  clopidogrel (PLAVIX) 75 mg tablet, TAKE 1 TABLET BY MOUTH EVERY DAY, Disp: 90 tablet, Rfl: 3  •  DULoxetine (CYMBALTA) 60 mg delayed release capsule, TAKE 1 CAPSULE BY MOUTH EVERY DAY, Disp: 90 capsule, Rfl: 3  •  fluticasone (FLONASE) 50 mcg/act nasal spray, SPRAY 1 SPRAY INTO EACH NOSTRIL EVERY DAY (Patient not taking: Reported on 4/27/2023), Disp: , Rfl:   •  furosemide (LASIX) 40 mg tablet, TAKE 1 TABLET BY MOUTH DAILY DO NOT START BEFORE JANUARY 28, 2023., Disp: 90 tablet, Rfl: 0  •  gabapentin (NEURONTIN) 400 mg capsule, TAKE 1 CAPSULE BY MOUTH 2 TIMES A DAY. , Disp: 180 capsule, Rfl: 3  •  isosorbide mononitrate (IMDUR) 30 mg 24 hr tablet, Take 1 tablet (30 mg total) by mouth daily, Disp: 90 tablet, Rfl: 3  •  lidocaine (LIDODERM) 5 %, REMOVE & DISCARD PATCH WITHIN 12 HOURS OR AS DIRECTED BY MD USE 1 PATCH IN [facility] KNEE DAILY, Disp: 60 patch, Rfl: 0  •  losartan (COZAAR) 100 MG tablet, TAKE 1 TABLET BY MOUTH EVERY DAY, Disp: 90 tablet, Rfl: 3  •  nitroglycerin (NITROSTAT) 0.4 mg SL tablet, Place 1 tablet (0.4 mg total) under the tongue every 5 (five) minutes as needed for chest pain (Patient not taking: Reported on 2/22/2023), Disp: 50 tablet, Rfl: 2  •  Omega-3 Fatty Acids (FISH OIL PO), Take 1,000 mg by mouth daily, Disp: , Rfl:   •  ranolazine (RANEXA) 1000 MG SR tablet, TAKE 1 TABLET (1,000 MG TOTAL) BY MOUTH EVERY 12 (TWELVE) HOURS, Disp: 180 tablet, Rfl: 2  •  ZINC GLUCONATE PO, Take 50 mg by mouth daily, Disp: , Rfl:     Allergies   Allergen Reactions   • Medical Tape Other (See Comments) and Rash   • Sulfa Antibiotics Other (See Comments)   • Fosinopril Cough     Cough     • Lisinopril Cough   • Wound Dressing Adhesive Dermatitis and Rash       Physical Exam:   Vitals:    07/05/23 1044   BP: 151/75   Pulse: (!) 53   Resp: 20   Temp: 98.4 °F (36.9 °C)   SpO2: 92%     General: Awake, Alert, Oriented x 3, Mood and affect appropriate  Respiratory: Respirations even and unlabored  Cardiovascular: Peripheral pulses intact; no edema  Musculoskeletal Exam: right knee pain    ASA Score: 3    Patient/Chart Verification  Patient ID Verified: Verbal  Consents Confirmed: Procedural, To be obtained in the Pre-Procedure area  H&P( within 30 days) Verified: Yes  Allergies Reviewed: Yes  Anticoag/NSAID held?: No (plavix  not held this procedure)  Currently on antibiotics?: NA  Pregnancy denied?: NA    Assessment:   1.  Primary osteoarthritis of both knees        Plan: R Geniculate nerve RFA

## 2023-07-07 NOTE — TELEPHONE ENCOUNTER
S/W pt. Pt stated needle sites look good, denies S/S of infection, denies fever, denies soreness and denies sunburn like sensation. Advised pt if he/she does get pain to take prescribed or OTC pain medications and/or use ice/heat and it will take 4-6 weeks to take full effect. Confirmed next appt with pt. Pt verbalized understanding.

## 2023-07-13 ENCOUNTER — TELEPHONE (OUTPATIENT)
Age: 68
End: 2023-07-13

## 2023-07-19 ENCOUNTER — TELEPHONE (OUTPATIENT)
Dept: PAIN MEDICINE | Facility: MEDICAL CENTER | Age: 68
End: 2023-07-19

## 2023-07-19 ENCOUNTER — HOSPITAL ENCOUNTER (OUTPATIENT)
Dept: RADIOLOGY | Facility: MEDICAL CENTER | Age: 68
Discharge: HOME | End: 2023-07-19
Admitting: PHYSICAL MEDICINE & REHABILITATION
Payer: COMMERCIAL

## 2023-07-19 VITALS
SYSTOLIC BLOOD PRESSURE: 183 MMHG | RESPIRATION RATE: 20 BRPM | HEART RATE: 52 BPM | OXYGEN SATURATION: 94 % | DIASTOLIC BLOOD PRESSURE: 86 MMHG | TEMPERATURE: 98.3 F

## 2023-07-19 DIAGNOSIS — M17.0 PRIMARY OSTEOARTHRITIS OF BOTH KNEES: ICD-10-CM

## 2023-07-19 PROCEDURE — 64624 DSTRJ NULYT AGT GNCLR NRV: CPT | Performed by: PHYSICAL MEDICINE & REHABILITATION

## 2023-07-19 RX ORDER — BUPIVACAINE HCL/PF 2.5 MG/ML
5 VIAL (ML) INJECTION ONCE
Status: COMPLETED | OUTPATIENT
Start: 2023-07-19 | End: 2023-07-19

## 2023-07-19 RX ADMIN — Medication 5 ML: at 11:17

## 2023-07-19 NOTE — H&P
History of Present Illness: The patient is a 79 y.o. male who presents with complaints of left knee pain    Past Medical History:   Diagnosis Date   • Arthritis 2008   • Cough    • Heart disease 2000   • HTN (hypertension)    • Hyperlipidemia    • Hypertension 2000   • Osteoarthritis 2002   • Sleep apnea, obstructive    • SOB (shortness of breath)    • Wheezing        Past Surgical History:   Procedure Laterality Date   • CARDIAC CATHETERIZATION Left 1/27/2023    Procedure: Cardiac Left Heart Cath;  Surgeon: Medhat Mcrae MD;  Location: [address] CATH LAB; Service: Cardiology   • CARDIAC CATHETERIZATION  1/27/2023    Procedure: Cardiac catheterization;  Surgeon: Medhat Mcrae MD;  Location: [address] CATH LAB; Service: Cardiology   • CORONARY ANGIOPLASTY WITH STENT PLACEMENT N/A    • CORONARY ARTERY BYPASS GRAFT      12 years ago   • KNEE CARTILAGE SURGERY Left     left knee meniscus repair         Current Outpatient Medications:   •  Ascorbic Acid (VITAMIN C PO), Take 100 mg by mouth daily, Disp: , Rfl:   •  atorvastatin (LIPITOR) 40 mg tablet, TAKE 1 TABLET BY MOUTH DAILY WITH DINNER, Disp: 90 tablet, Rfl: 3  •  buPROPion (WELLBUTRIN XL) 150 mg 24 hr tablet, TAKE 1 TABLET BY MOUTH EVERY DAY IN THE MORNING, Disp: 90 tablet, Rfl: 2  •  carvedilol (COREG) 25 mg tablet, TAKE 1 TABLET BY MOUTH TWICE A DAY WITH FOOD, Disp: 180 tablet, Rfl: 3  •  clopidogrel (PLAVIX) 75 mg tablet, TAKE 1 TABLET BY MOUTH EVERY DAY, Disp: 90 tablet, Rfl: 3  •  DULoxetine (CYMBALTA) 60 mg delayed release capsule, TAKE 1 CAPSULE BY MOUTH EVERY DAY, Disp: 90 capsule, Rfl: 3  •  fluticasone (FLONASE) 50 mcg/act nasal spray, SPRAY 1 SPRAY INTO EACH NOSTRIL EVERY DAY (Patient not taking: Reported on 4/27/2023), Disp: , Rfl:   •  furosemide (LASIX) 40 mg tablet, TAKE 1 TABLET BY MOUTH DAILY DO NOT START BEFORE JANUARY 28, 2023., Disp: 90 tablet, Rfl: 0  •  gabapentin (NEURONTIN) 400 mg capsule, TAKE 1 CAPSULE BY MOUTH 2 TIMES A DAY. , Disp: 180 capsule, Rfl: 3  •  isosorbide mononitrate (IMDUR) 30 mg 24 hr tablet, Take 1 tablet (30 mg total) by mouth daily, Disp: 90 tablet, Rfl: 3  •  lidocaine (LIDODERM) 5 %, REMOVE & DISCARD PATCH WITHIN 12 HOURS OR AS DIRECTED BY MD USE 1 PATCH IN [facility] KNEE DAILY, Disp: 60 patch, Rfl: 0  •  losartan (COZAAR) 100 MG tablet, TAKE 1 TABLET BY MOUTH EVERY DAY, Disp: 90 tablet, Rfl: 3  •  nitroglycerin (NITROSTAT) 0.4 mg SL tablet, Place 1 tablet (0.4 mg total) under the tongue every 5 (five) minutes as needed for chest pain (Patient not taking: Reported on 2/22/2023), Disp: 50 tablet, Rfl: 2  •  Omega-3 Fatty Acids (FISH OIL PO), Take 1,000 mg by mouth daily, Disp: , Rfl:   •  ranolazine (RANEXA) 1000 MG SR tablet, TAKE 1 TABLET (1,000 MG TOTAL) BY MOUTH EVERY 12 (TWELVE) HOURS, Disp: 180 tablet, Rfl: 2  •  ZINC GLUCONATE PO, Take 50 mg by mouth daily, Disp: , Rfl:     Current Facility-Administered Medications:   •  bupivacaine (PF) (MARCAINE) 0.25 % injection 5 mL, 5 mL, Perineural, Once, Gradie Beverage, DO  •  lidocaine (PF) (XYLOCAINE-MPF) 2 % injection 5 mL, 5 mL, Perineural, Once, Gradie Beverage, DO    Allergies   Allergen Reactions   • Medical Tape Other (See Comments) and Rash   • Sulfa Antibiotics Other (See Comments)   • Fosinopril Cough     Cough     • Lisinopril Cough   • Wound Dressing Adhesive Dermatitis and Rash       Physical Exam:   Vitals:    07/19/23 1102   BP: 170/86   Pulse: (!) 53   Resp: 18   Temp: 98.3 °F (36.8 °C)   SpO2: 96%     General: Awake, Alert, Oriented x 3, Mood and affect appropriate  Respiratory: Respirations even and unlabored  Cardiovascular: Peripheral pulses intact; no edema  Musculoskeletal Exam: left knee pain    ASA Score: 3    Patient/Chart Verification  Patient ID Verified: Verbal  ID Band Applied: No  Consents Confirmed: Procedural, To be obtained in the Pre-Procedure area  H&P( within 30 days) Verified:  To be obtained in the Pre-Procedure area  Interval H&P(within 24 hr) Complete (required for Outpatients and Surgery Admit only): To be obtained in the Pre-Procedure area  Allergies Reviewed: Yes  Anticoag/NSAID held?: NA  Currently on antibiotics?: No    Assessment:   1.  Primary osteoarthritis of both knees        Plan: L Geniculate nerve RFA

## 2023-07-19 NOTE — TELEPHONE ENCOUNTER
Pt s/p Left Geniculate Nerve RFA on 7/19 with JE at [address] [place] [address]. No f/u scheduled at this time. Please schedule Pt for x4-6wks f/u OV.

## 2023-07-19 NOTE — DISCHARGE INSTRUCTIONS

## 2023-07-24 ENCOUNTER — PATIENT MESSAGE (OUTPATIENT)
Dept: OBGYN CLINIC | Facility: MEDICAL CENTER | Age: 68
End: 2023-07-24

## 2023-07-24 NOTE — LETTER
Knee Exercises   AMBULATORY CARE:   What you need to know about knee exercises:  Knee exercises help strengthen the muscles around your knee. Strong muscles can help reduce pain and decrease your risk of future injury. Knee exercises also help you heal after an injury or surgery. These are beginning exercises. Ask your healthcare provider if you need to see a physical therapist for more advanced exercises. General guidelines for knee exercises:   Start slowly. As you get stronger, you may be able to do more sets of each exercise or add weights. Stop if you feel pain. It is normal to feel some discomfort at first, but you should not feel pain. Tell your provider or physical therapist if you have pain while you exercise. Regular exercise will help decrease your discomfort over time. Do the exercises on both legs. Do this so both knees remain strong. Warm up before you do knee exercises. Walk or ride a stationary bike for 5 or 10 minutes to warm your muscles. How to perform knee stretches safely:  Always stretch before you do strengthening exercises. Do these stretching exercises again after you do the strengthening exercises. Do these stretches 4 or 5 days a week, or as directed. Standing calf stretch: Face a wall and place both palms flat on the wall, or hold the back of a chair for balance. Keep a slight bend in your knees. Take a big step backward with one leg. Keep your other leg directly under you. Keep both heels flat and press your hips forward. Hold the stretch for 30 seconds, and then relax for 30 seconds. Switch legs. Repeat 2 or 3 times on each leg. Standing quadriceps stretch:  Stand and place one hand against a wall or hold the back of a chair for balance. With your weight on one leg, bend your other leg and grab your ankle. Bring your heel toward your buttocks. Hold the stretch for 30 to 60 seconds. Switch legs. Repeat 2 or 3 times on each leg.          Sitting hamstring stretch:  Sit with both legs straight in front of you. Do not point or flex your toes. Place your palms on the floor and slide your hands forward until you feel the stretch. Do not round your back. Hold the stretch for 30 seconds. Repeat 2 or 3 times. How to perform knee strengthening exercises safely:  Do these exercises 4 or 5 days a week, or as directed. Standing half squats:  Stand with your feet shoulder-width apart. Lean your back against a wall or hold the back of a chair for balance, if needed. Slowly sit down about 10 inches, as if you are going to sit in a chair. Your body weight should be mostly over your heels. Hold the squat for 5 seconds, then rise to a standing position. Do 3 sets of 10 squats to strengthen your buttocks and thighs. Standing hamstring curls: Face a wall and place both palms flat on the wall, or hold the back of a chair for balance. With your weight on one leg, lift your other foot as close to your buttocks as you can. Hold for 5 seconds and then lower your leg. Do 2 sets of 10 curls on each leg. This exercise strengthens the muscles in the back of your thigh. Standing calf raises:  Face a wall and place both palms flat on the wall, or hold the back of a chair for balance. Stand up straight, and do not lean. Place all your weight on one leg by lifting the other foot off the floor. Raise the heel of the foot that is on the floor as high as you can and then lower it. Do 2 sets of 10 calf raises on each leg to strengthen your calf muscles. Straight leg lifts:  Lie on your stomach with straight legs. Fold your arms in front of you and rest your head in your arms. Tighten your leg muscles and raise one leg as high as you can. Hold for 5 seconds, then lower your leg. Do 2 sets of 10 lifts on each leg to strengthen your buttocks. Sitting leg lifts:  Sit in a chair. Slowly straighten and raise one leg. Squeeze your thigh muscles and hold for 5 seconds. Relax and return your foot to the floor. Do 2 sets of 10 lifts on each leg. This helps strengthen the muscles in the front of your thigh. Call your doctor or physical therapist if:   You have new pain or your pain becomes worse. You have questions or concerns about your condition or care. © Copyright [employer] 2022 Information is for End User's use only and may not be sold, redistributed or otherwise used for commercial purposes. The above information is an  only. It is not intended as medical advice for individual conditions or treatments. Talk to your doctor, nurse or pharmacist before following any medical regimen to see if it is safe and effective for you.

## 2023-07-24 NOTE — TELEPHONE ENCOUNTER
--FYI--    Pt did well since having the left geniculate RFA no s/s of infection or sunburn sensation. Aware it takes 2 weeks to notice pain relief and 4-6 weeks for full pain effect to be achieved. Aware to medicate as previous for discomfort and may use ice or heat. Confirmed next appt. Call if any questions or concerns prior to next appt.

## 2023-09-05 DIAGNOSIS — I50.32 CHRONIC DIASTOLIC CONGESTIVE HEART FAILURE (HCC): ICD-10-CM

## 2023-09-06 RX ORDER — FUROSEMIDE 40 MG/1
40 TABLET ORAL DAILY
Qty: 90 TABLET | Refills: 0 | Status: SHIPPED | OUTPATIENT
Start: 2023-09-06

## 2023-11-10 DIAGNOSIS — F33.1 MODERATE EPISODE OF RECURRENT MAJOR DEPRESSIVE DISORDER (HCC): ICD-10-CM

## 2023-11-10 DIAGNOSIS — I25.118 CORONARY ARTERY DISEASE OF NATIVE ARTERY OF NATIVE HEART WITH STABLE ANGINA PECTORIS (HCC): ICD-10-CM

## 2023-11-10 DIAGNOSIS — I50.32 CHRONIC DIASTOLIC CONGESTIVE HEART FAILURE (HCC): ICD-10-CM

## 2023-11-10 RX ORDER — BUPROPION HYDROCHLORIDE 150 MG/1
TABLET ORAL
Qty: 90 TABLET | Refills: 2 | Status: SHIPPED | OUTPATIENT
Start: 2023-11-10

## 2023-11-10 RX ORDER — RANOLAZINE 1000 MG/1
TABLET, EXTENDED RELEASE ORAL
Qty: 180 TABLET | Refills: 2 | Status: SHIPPED | OUTPATIENT
Start: 2023-11-10

## 2023-11-25 DIAGNOSIS — I25.118 CORONARY ARTERY DISEASE OF NATIVE ARTERY OF NATIVE HEART WITH STABLE ANGINA PECTORIS (HCC): ICD-10-CM

## 2023-11-26 DIAGNOSIS — I50.32 CHRONIC DIASTOLIC CONGESTIVE HEART FAILURE (HCC): ICD-10-CM

## 2023-11-26 DIAGNOSIS — E78.00 PURE HYPERCHOLESTEROLEMIA: ICD-10-CM

## 2023-11-26 DIAGNOSIS — F32.A DEPRESSIVE DISORDER: ICD-10-CM

## 2023-11-26 RX ORDER — LOSARTAN POTASSIUM 100 MG/1
TABLET ORAL
Qty: 90 TABLET | Refills: 3 | Status: SHIPPED | OUTPATIENT
Start: 2023-11-26

## 2023-11-26 RX ORDER — DULOXETIN HYDROCHLORIDE 60 MG/1
CAPSULE, DELAYED RELEASE ORAL
Qty: 90 CAPSULE | Refills: 3 | Status: SHIPPED | OUTPATIENT
Start: 2023-11-26

## 2023-11-26 RX ORDER — CARVEDILOL 25 MG/1
TABLET ORAL
Qty: 180 TABLET | Refills: 3 | Status: SHIPPED | OUTPATIENT
Start: 2023-11-26

## 2023-11-26 RX ORDER — ATORVASTATIN CALCIUM 40 MG/1
40 TABLET, FILM COATED ORAL
Qty: 90 TABLET | Refills: 3 | Status: SHIPPED | OUTPATIENT
Start: 2023-11-26

## 2023-12-04 DIAGNOSIS — I50.32 CHRONIC DIASTOLIC CONGESTIVE HEART FAILURE (HCC): ICD-10-CM

## 2023-12-04 RX ORDER — FUROSEMIDE 40 MG/1
40 TABLET ORAL DAILY
Qty: 90 TABLET | Refills: 3 | Status: SHIPPED | OUTPATIENT
Start: 2023-12-04

## 2023-12-07 ENCOUNTER — OFFICE VISIT (OUTPATIENT)
Dept: OBGYN CLINIC | Facility: MEDICAL CENTER | Age: 68
End: 2023-12-07
Payer: COMMERCIAL

## 2023-12-07 VITALS
BODY MASS INDEX: 46.21 KG/M2 | WEIGHT: 312 LBS | HEART RATE: 60 BPM | SYSTOLIC BLOOD PRESSURE: 194 MMHG | HEIGHT: 69 IN | DIASTOLIC BLOOD PRESSURE: 97 MMHG

## 2023-12-07 DIAGNOSIS — M17.0 PRIMARY OSTEOARTHRITIS OF BOTH KNEES: Primary | ICD-10-CM

## 2023-12-07 DIAGNOSIS — G89.29 CHRONIC PAIN OF LEFT KNEE: ICD-10-CM

## 2023-12-07 DIAGNOSIS — G89.29 CHRONIC PAIN OF RIGHT KNEE: ICD-10-CM

## 2023-12-07 DIAGNOSIS — M25.561 CHRONIC PAIN OF RIGHT KNEE: ICD-10-CM

## 2023-12-07 DIAGNOSIS — M25.562 CHRONIC PAIN OF LEFT KNEE: ICD-10-CM

## 2023-12-07 PROCEDURE — 99213 OFFICE O/P EST LOW 20 MIN: CPT | Performed by: STUDENT IN AN ORGANIZED HEALTH CARE EDUCATION/TRAINING PROGRAM

## 2023-12-07 NOTE — PROGRESS NOTES
Knee Follow up Office Note    Assessment:     1. Primary osteoarthritis of both knees    2. Chronic pain of right knee    3. Chronic pain of left knee          Plan:     Problem List Items Addressed This Visit          Musculoskeletal and Integument    Primary osteoarthritis of both knees - Primary     Other Visit Diagnoses       Chronic pain of right knee        Chronic pain of left knee                77 y/o male with bilateral knee pain secondary to severe bone on bone OA. He has been treating conservatively with intermittent injections. On physical exam he continues with significant loss of range of motion with pain medially. We discussed today that the patient's BMI is increased from his last visit and is now 46.07. At this time he is not a surgical candidate for TKA due to elevated BMI. We discussed that he has multiple co-morbidities and increased BMI is a risk factor for PJI. He would like to go to another hospital system to see if they will do his total knee replacement despite his BMI. Subjective:     Patient ID: Otto Theodore is a 76 y.o. male. Chief Complaint:  HPI:  Patient is a 77 y/o male who presents today for a follow up evaluation of his bilateral knees. He has known severe OA of the bilateral knees, which he has been treating for conservatively. He has a history of steroid injections in the past by Dr. Alessandro Kirkpatrick with only a few weeks of relief. Most recently she had visco injections with Dr. Taiwo Pantoja over a year ago with short term relief in symptoms. He has progressively worsening pain in the bilateral knees and ambulates with an SPC. He is struggling with his ADLs and is unable to walk his dog due to the pain. He states that his pain continues to worsen and he would like to discuss TKA. He is also having lumbar radiculopathy symptoms, he does not have a follow up with Dr. Trae Rivera scheduled.     He has aortic valve stenosis, he sees cardiology and they are thinking about doing a valve replacement. He has a history of a CABG from 10 years ago. Allergy:  Allergies   Allergen Reactions    Medical Tape Other (See Comments) and Rash    Sulfa Antibiotics Other (See Comments)    Fosinopril Cough     Cough      Lisinopril Cough    Wound Dressing Adhesive Dermatitis and Rash     Medications:  all current active meds have been reviewed  Past Medical History:  Past Medical History:   Diagnosis Date    Arthritis 2008    Cough     Heart disease 2000    HTN (hypertension)     Hyperlipidemia     Hypertension 2000    Osteoarthritis 2002    Sleep apnea, obstructive     SOB (shortness of breath)     Wheezing      Past Surgical History:  Past Surgical History:   Procedure Laterality Date    CARDIAC CATHETERIZATION Left 1/27/2023    Procedure: Cardiac Left Heart Cath;  Surgeon: Tesha Rush MD;  Location: [address] CATH LAB; Service: Cardiology    CARDIAC CATHETERIZATION  1/27/2023    Procedure: Cardiac catheterization;  Surgeon: Tesha Rush MD;  Location: [address] CATH LAB;   Service: Cardiology    CORONARY ANGIOPLASTY WITH STENT PLACEMENT N/A     CORONARY ARTERY BYPASS GRAFT      12 years ago    KNEE CARTILAGE SURGERY Left     left knee meniscus repair     Family History:  Family History   Problem Relation Age of Onset    Heart disease Father     Arthritis Father     Hypertension Father     Stroke Father     Arthritis Paternal Grandfather      Social History:  Social History     Substance and Sexual Activity   Alcohol Use Yes    Alcohol/week: 7.0 standard drinks of alcohol    Types: 5 Cans of beer, 2 Shots of liquor per week    Comment: minor     Social History     Substance and Sexual Activity   Drug Use Never     Social History     Tobacco Use   Smoking Status Never   Smokeless Tobacco Never   Tobacco Comments    Never Used           ROS:  General: Per HPI  Skin: Negative, except if noted below  HEENT: Negative  Respiratory: Negative  Cardiovascular: Negative  Gastrointestinal: Negative  Urinary: Negative  Vascular: Negative  Musculoskeletal: Positive per HPI   Neurologic: Positive per HPI  Endocrine: Negative    Objective:  BP Readings from Last 1 Encounters:   12/07/23 (!) 194/97      Wt Readings from Last 1 Encounters:   12/07/23 (!) 142 kg (312 lb)        Respiratory:   non-labored respirations    Lymphatics:  no palpable lymph nodes    Gait:   antalgic    Neurologic:   Alert and oriented times 3  Patient with normal sensation except as noted below  Deep tendon reflexes 2+ except as noted in MSK exam    Bilateral Lower Extremity:  Left Knee: Inspection:  Skin intact, venous statis dermatitis changes noted. Overall limb alignment varus    Effusion:     ROM 15-95* with pain    Extensor Lag: none    Palpation: medial Joint line tenderness to palpation    AP Stability at 90 deg stable    M/L stability in full extension stable    M/L stability in midflexion stable    Motor: 5/5 IP/Q/HS/TA/GS    Pulses: 2+ DP / 2+ PT    SILT DP/SP/S/S/TN    Right knee: Inspection:  Skin intact, venous statis dermatitis changes noted. Overall limb alignment varus    Effusion:     ROM 15-95* with pain    Extensor Lag: none    Palpation: medial Joint line tenderness to palpation    AP Stability at 90 deg stable    M/L stability in full extension stable    M/L stability in midflexion stable    Motor: 5/5 IP/Q/HS/TA/GS    Pulses: 2+ DP / 2+ PT    SILT DP/SP/S/S/TN    Imaging:  No new imaging today    BMI:   Estimated body mass index is 46.07 kg/m² as calculated from the following:    Height as of this encounter: 5' 9" (1.753 m). Weight as of this encounter: 142 kg (312 lb). BSA:   Estimated body surface area is 2.5 meters squared as calculated from the following:    Height as of this encounter: 5' 9" (1.753 m). Weight as of this encounter: 142 kg (312 lb).            Scribe Attestation      I,:  Charly Butterfield PA-C am acting as a scribe while in the presence of the attending physician.:       I,:  Joon Roach Lakesha Yo DO personally performed the services described in this documentation    as scribed in my presence.:

## 2023-12-11 ENCOUNTER — TELEPHONE (OUTPATIENT)
Age: 68
End: 2023-12-11

## 2023-12-11 NOTE — TELEPHONE ENCOUNTER
Caller: Carlos Alberto Okeefe     Doctor: Dr Sunshine     Reason for call: Patient calling stating 8/10 pain level and would like to schedule procedure please advise     Call back#: [phone]

## 2023-12-11 NOTE — TELEPHONE ENCOUNTER
S/w pt regarding previous. Placed on [facility] sovs for 12/22 at 2:15 arrival time for re eval s/p bilat geniculate RFA's in July 2023. Sovs needed per JE

## 2023-12-13 ENCOUNTER — PATIENT MESSAGE (OUTPATIENT)
Age: 68
End: 2023-12-13

## 2023-12-13 DIAGNOSIS — G62.9 NEUROPATHY: ICD-10-CM

## 2023-12-13 RX ORDER — GABAPENTIN 400 MG/1
400 CAPSULE ORAL 2 TIMES DAILY
Qty: 180 CAPSULE | Refills: 3 | Status: SHIPPED | OUTPATIENT
Start: 2023-12-13

## 2024-01-17 ENCOUNTER — TELEPHONE (OUTPATIENT)
Dept: OBGYN CLINIC | Facility: MEDICAL CENTER | Age: 69
End: 2024-01-17

## 2024-01-17 NOTE — TELEPHONE ENCOUNTER
Left a message asking the patient to call the office if he wants to reschedule his appointment he missed on 1/10

## 2024-01-31 ENCOUNTER — OFFICE VISIT (OUTPATIENT)
Age: 69
End: 2024-01-31
Payer: COMMERCIAL

## 2024-01-31 ENCOUNTER — APPOINTMENT (OUTPATIENT)
Age: 69
End: 2024-01-31
Payer: COMMERCIAL

## 2024-01-31 VITALS
BODY MASS INDEX: 44.82 KG/M2 | HEART RATE: 61 BPM | TEMPERATURE: 97.8 F | WEIGHT: 302.6 LBS | RESPIRATION RATE: 18 BRPM | DIASTOLIC BLOOD PRESSURE: 63 MMHG | SYSTOLIC BLOOD PRESSURE: 118 MMHG | OXYGEN SATURATION: 99 % | HEIGHT: 69 IN

## 2024-01-31 DIAGNOSIS — I50.32 CHRONIC DIASTOLIC CONGESTIVE HEART FAILURE (HCC): ICD-10-CM

## 2024-01-31 DIAGNOSIS — Z12.12 SCREENING FOR COLORECTAL CANCER: ICD-10-CM

## 2024-01-31 DIAGNOSIS — G62.9 NEUROPATHY: ICD-10-CM

## 2024-01-31 DIAGNOSIS — I25.118 CORONARY ARTERY DISEASE OF NATIVE ARTERY OF NATIVE HEART WITH STABLE ANGINA PECTORIS (HCC): ICD-10-CM

## 2024-01-31 DIAGNOSIS — Z12.11 SCREENING FOR COLORECTAL CANCER: ICD-10-CM

## 2024-01-31 DIAGNOSIS — I87.2 VENOUS INSUFFICIENCY: ICD-10-CM

## 2024-01-31 DIAGNOSIS — I10 PRIMARY HYPERTENSION: Primary | ICD-10-CM

## 2024-01-31 DIAGNOSIS — F33.1 MODERATE EPISODE OF RECURRENT MAJOR DEPRESSIVE DISORDER (HCC): ICD-10-CM

## 2024-01-31 DIAGNOSIS — I27.82 CHRONIC PULMONARY EMBOLISM, UNSPECIFIED PULMONARY EMBOLISM TYPE, UNSPECIFIED WHETHER ACUTE COR PULMONALE PRESENT (HCC): ICD-10-CM

## 2024-01-31 DIAGNOSIS — I35.0 MODERATE AORTIC VALVE STENOSIS: ICD-10-CM

## 2024-01-31 DIAGNOSIS — E78.00 PURE HYPERCHOLESTEROLEMIA: ICD-10-CM

## 2024-01-31 DIAGNOSIS — E66.2 OBESITY HYPOVENTILATION SYNDROME (HCC): ICD-10-CM

## 2024-01-31 LAB
ALBUMIN SERPL BCP-MCNC: 4.4 G/DL (ref 3.5–5)
ALP SERPL-CCNC: 77 U/L (ref 34–104)
ALT SERPL W P-5'-P-CCNC: 32 U/L (ref 7–52)
ANION GAP SERPL CALCULATED.3IONS-SCNC: 12 MMOL/L
AST SERPL W P-5'-P-CCNC: 26 U/L (ref 13–39)
BASOPHILS # BLD AUTO: 0.07 THOUSANDS/ÂΜL (ref 0–0.1)
BASOPHILS NFR BLD AUTO: 1 % (ref 0–1)
BILIRUB SERPL-MCNC: 0.61 MG/DL (ref 0.2–1)
BUN SERPL-MCNC: 18 MG/DL (ref 5–25)
CALCIUM SERPL-MCNC: 9.4 MG/DL (ref 8.4–10.2)
CHLORIDE SERPL-SCNC: 104 MMOL/L (ref 96–108)
CHOLEST SERPL-MCNC: 126 MG/DL
CO2 SERPL-SCNC: 24 MMOL/L (ref 21–32)
CREAT SERPL-MCNC: 0.85 MG/DL (ref 0.6–1.3)
EOSINOPHIL # BLD AUTO: 0.17 THOUSAND/ÂΜL (ref 0–0.61)
EOSINOPHIL NFR BLD AUTO: 2 % (ref 0–6)
ERYTHROCYTE [DISTWIDTH] IN BLOOD BY AUTOMATED COUNT: 13.9 % (ref 11.6–15.1)
GFR SERPL CREATININE-BSD FRML MDRD: 89 ML/MIN/1.73SQ M
GLUCOSE P FAST SERPL-MCNC: 108 MG/DL (ref 65–99)
HCT VFR BLD AUTO: 42 % (ref 36.5–49.3)
HDLC SERPL-MCNC: 43 MG/DL
HGB BLD-MCNC: 13.8 G/DL (ref 12–17)
IMM GRANULOCYTES # BLD AUTO: 0.03 THOUSAND/UL (ref 0–0.2)
IMM GRANULOCYTES NFR BLD AUTO: 0 % (ref 0–2)
INR PPP: 1.12 (ref 0.84–1.19)
LDLC SERPL CALC-MCNC: 63 MG/DL (ref 0–100)
LYMPHOCYTES # BLD AUTO: 1.98 THOUSANDS/ÂΜL (ref 0.6–4.47)
LYMPHOCYTES NFR BLD AUTO: 27 % (ref 14–44)
MCH RBC QN AUTO: 30.9 PG (ref 26.8–34.3)
MCHC RBC AUTO-ENTMCNC: 32.9 G/DL (ref 31.4–37.4)
MCV RBC AUTO: 94 FL (ref 82–98)
MONOCYTES # BLD AUTO: 0.73 THOUSAND/ÂΜL (ref 0.17–1.22)
MONOCYTES NFR BLD AUTO: 10 % (ref 4–12)
NEUTROPHILS # BLD AUTO: 4.49 THOUSANDS/ÂΜL (ref 1.85–7.62)
NEUTS SEG NFR BLD AUTO: 60 % (ref 43–75)
NONHDLC SERPL-MCNC: 83 MG/DL
NRBC BLD AUTO-RTO: 0 /100 WBCS
PLATELET # BLD AUTO: 242 THOUSANDS/UL (ref 149–390)
PMV BLD AUTO: 10.9 FL (ref 8.9–12.7)
POTASSIUM SERPL-SCNC: 4.3 MMOL/L (ref 3.5–5.3)
PROT SERPL-MCNC: 7.4 G/DL (ref 6.4–8.4)
PROTHROMBIN TIME: 14.3 SECONDS (ref 11.6–14.5)
RBC # BLD AUTO: 4.46 MILLION/UL (ref 3.88–5.62)
SODIUM SERPL-SCNC: 140 MMOL/L (ref 135–147)
TRIGL SERPL-MCNC: 98 MG/DL
TSH SERPL DL<=0.05 MIU/L-ACNC: 1.19 UIU/ML (ref 0.45–4.5)
WBC # BLD AUTO: 7.47 THOUSAND/UL (ref 4.31–10.16)

## 2024-01-31 PROCEDURE — 3725F SCREEN DEPRESSION PERFORMED: CPT | Performed by: INTERNAL MEDICINE

## 2024-01-31 PROCEDURE — 80061 LIPID PANEL: CPT

## 2024-01-31 PROCEDURE — 99214 OFFICE O/P EST MOD 30 MIN: CPT | Performed by: INTERNAL MEDICINE

## 2024-01-31 PROCEDURE — 3288F FALL RISK ASSESSMENT DOCD: CPT | Performed by: INTERNAL MEDICINE

## 2024-01-31 PROCEDURE — 1101F PT FALLS ASSESS-DOCD LE1/YR: CPT | Performed by: INTERNAL MEDICINE

## 2024-01-31 PROCEDURE — 84443 ASSAY THYROID STIM HORMONE: CPT

## 2024-01-31 PROCEDURE — 1159F MED LIST DOCD IN RCRD: CPT | Performed by: INTERNAL MEDICINE

## 2024-01-31 PROCEDURE — 80053 COMPREHEN METABOLIC PANEL: CPT

## 2024-01-31 PROCEDURE — 1160F RVW MEDS BY RX/DR IN RCRD: CPT | Performed by: INTERNAL MEDICINE

## 2024-01-31 PROCEDURE — 1100F PTFALLS ASSESS-DOCD GE2>/YR: CPT | Performed by: INTERNAL MEDICINE

## 2024-01-31 PROCEDURE — 85025 COMPLETE CBC W/AUTO DIFF WBC: CPT

## 2024-01-31 RX ORDER — FUROSEMIDE 40 MG/1
40 TABLET ORAL 2 TIMES DAILY
Qty: 180 TABLET | Refills: 3 | Status: SHIPPED | OUTPATIENT
Start: 2024-01-31

## 2024-01-31 NOTE — PROGRESS NOTES
INTERNAL MEDICINE OFFICE VISIT  [facility] [place] [address]  Tel: [phone]      NAME: Otto Okeefe  AGE: 68 y.o.  SEX: male  : 1955   MRN: [id number]    DATE: 2024  TIME: 10:46 AM      Assessment and Plan:  1. Primary hypertension  Blood pressure is stable, continue losartan and carvedilol at the same dose    2. Pure hypercholesterolemia  Continue atorvastatin  - CBC and differential; Future  - Comprehensive metabolic panel; Future  - Lipid panel; Future  - TSH, 3rd generation; Future    3. Venous insufficiency  Stable, was advised to elevate his feet while sitting    4. Moderate aortic valve stenosis  Follow-up with cardiology.  Had a DEBBIE done recently.  Will be needing a TAVR procedure    5. Coronary artery disease of native artery of native heart with stable angina pectoris (HCC)  Follow-up with cardiology and continue all the medications as suggested    6. Chronic diastolic congestive heart failure (HCC)  Continue furosemide, he was told to increase the dose to 40 mg twice a day and it was sent to the pharmacy  - furosemide (LASIX) 40 mg tablet; Take 1 tablet (40 mg total) by mouth 2 (two) times a day  Dispense: 180 tablet; Refill: 3    7. Neuropathy  Continue gabapentin    8. Moderate episode of recurrent major depressive disorder (HCC)  Has been taking bupropion and duloxetine but still has symptoms of depression.  Does not want to increase the medication dosage.    Depression Screening and Follow-up Plan: Patient's depression screening was positive with a PHQ-9 score of 7. Patient assessed for underlying major depression. Brief counseling provided and recommend additional follow-up/re-evaluation next office visit.     Falls Plan of Care: balance, strength, and gait training instructions were provided.     9. Chronic pulmonary embolism, unspecified pulmonary embolism type, unspecified whether acute cor pulmonale present  (HCC)  Had a PE about 2 years ago and was on Xarelto for about 6 months but now does not take any anticoagulant    10. Obesity hypoventilation syndrome (HCC)  Was told to lose weight    11. Screening for colorectal cancer    - Cologuard      - Counseling Documentation: patient was counseled regarding: diagnostic results, instructions for management, risk factor reductions, prognosis, patient and family education, risks and benefits of treatment options, and importance of compliance with treatment  - Medication Side Effects: Adverse side effects of medications were reviewed with the patient/guardian today.      Return for follow up visit in 6 months  or earlier, if needed.      Chief Complaint:  Chief Complaint   Patient presents with    Follow-up         History of Present Illness:   This is a patient of Dr. Molina who needs to switch.  He has not been seen for a long time.  His blood pressure, cholesterol are stable on medication  He has coronary artery disease and congestive heart failure.  He also has moderate aortic valve stenosis and needs surgery for it.  He has been following up with cardiology.  He recently had a DEBBIE done and you plan and the cardiologist told him to increase the dose of furosemide to 40 mg twice a day.  His neuropathy is controlled with the gabapentin  He has been depressed as he is not able to do a lot of stuff and walk a lot.  It is because his knees hurt and the surgery cannot be done till he loses weight.  He was advised to try to limit his carbohydrates and calorie intake in order to lose weight      Active Problem List:  Patient Active Problem List   Diagnosis    Pulmonary embolism (HCC)    Coronary artery disease of native artery of native heart with stable angina pectoris (HCC)    Chronic diastolic congestive heart failure (HCC)    Varicose veins of right lower extremity    Renal lesion    Obesity hypoventilation syndrome (HCC)    Morbid obesity (HCC)    Moderate aortic valve  stenosis    Obstructive sleep apnea    Primary osteoarthritis of left knee    Pure hypercholesterolemia    S/P angioplasty with stent    Seasonal allergic rhinitis due to pollen    Venous insufficiency    Hx of CABG    Localized edema    Depressive disorder    Cor pulmonale, chronic (HCC)    Chronic pain of both shoulders    Bradycardia    Nonrheumatic mitral valve regurgitation    Unstable angina (HCC)    Primary osteoarthritis of both knees    Hypertension    Neuropathy    Moderate episode of recurrent major depressive disorder (HCC)         Past Medical History:  Past Medical History:   Diagnosis Date    Arthritis 2008    Cough     Heart disease 2000    HTN (hypertension)     Hyperlipidemia     Hypertension 2000    Osteoarthritis 2002    Sleep apnea, obstructive     SOB (shortness of breath)     Wheezing          Past Surgical History:  Past Surgical History:   Procedure Laterality Date    CARDIAC CATHETERIZATION Left 1/27/2023    Procedure: Cardiac Left Heart Cath;  Surgeon: Joss Justice MD;  Location: [facility];  Service: Cardiology    CARDIAC CATHETERIZATION  1/27/2023    Procedure: Cardiac catheterization;  Surgeon: Joss Justice MD;  Location: [facility];  Service: Cardiology    CORONARY ANGIOPLASTY WITH STENT PLACEMENT N/A     CORONARY ARTERY BYPASS GRAFT      12 years ago    KNEE CARTILAGE SURGERY Left     left knee meniscus repair         Family History:  Family History   Problem Relation Age of Onset    Heart disease Father     Arthritis Father     Hypertension Father     Stroke Father     Arthritis Paternal Grandfather          Social History:  Social History     Socioeconomic History    Marital status: /Civil Union     Spouse name: None    Number of children: None    Years of education: None    Highest education level: None   Occupational History    Occupation: employed   Tobacco Use    Smoking status: Never    Smokeless tobacco: Never    Tobacco comments:     Never  Used   Vaping Use    Vaping status: Never Used   Substance and Sexual Activity    Alcohol use: Yes     Alcohol/week: 7.0 standard drinks of alcohol     Types: 5 Cans of beer, 2 Shots of liquor per week     Comment: minor    Drug use: Never    Sexual activity: Not Currently     Partners: Female     Birth control/protection: Female Sterilization   Other Topics Concern    None   Social History Narrative    None     Social Determinants of Health     Financial Resource Strain: Not on file   Food Insecurity: Not on file   Transportation Needs: Unknown (9/13/2021)    PRAPARE - Transportation     Lack of Transportation (Medical): Not on file     Lack of Transportation (Non-Medical): No   Physical Activity: Not on file   Stress: No Stress Concern Present (1/6/2023)    [employer] - Occupational Stress Questionnaire     Feeling of Stress : Only a little   Social Connections: Not on file   Intimate Partner Violence: Not on file   Housing Stability: Not on file         Allergies:  Allergies   Allergen Reactions    Medical Tape Other (See Comments) and Rash    Sulfa Antibiotics Other (See Comments)    Fosinopril Cough     Cough      Lisinopril Cough    Wound Dressing Adhesive Dermatitis and Rash         Medications:    Current Outpatient Medications:     Ascorbic Acid (VITAMIN C PO), Take 100 mg by mouth daily, Disp: , Rfl:     atorvastatin (LIPITOR) 40 mg tablet, TAKE 1 TABLET BY MOUTH EVERY DAY WITH DINNER, Disp: 90 tablet, Rfl: 3    buPROPion (WELLBUTRIN XL) 150 mg 24 hr tablet, TAKE 1 TABLET BY MOUTH EVERY DAY IN THE MORNING, Disp: 90 tablet, Rfl: 2    carvedilol (COREG) 25 mg tablet, TAKE 1 TABLET BY MOUTH TWICE A DAY WITH FOOD, Disp: 180 tablet, Rfl: 3    clopidogrel (PLAVIX) 75 mg tablet, TAKE 1 TABLET BY MOUTH EVERY DAY, Disp: 90 tablet, Rfl: 3    DULoxetine (CYMBALTA) 60 mg delayed release capsule, TAKE 1 CAPSULE BY MOUTH EVERY DAY, Disp: 90 capsule, Rfl: 3    furosemide (LASIX) 40 mg  tablet, Take 1 tablet (40 mg total) by mouth 2 (two) times a day, Disp: 180 tablet, Rfl: 3    gabapentin (NEURONTIN) 400 mg capsule, Take 1 capsule (400 mg total) by mouth 2 (two) times a day, Disp: 180 capsule, Rfl: 3    isosorbide mononitrate (IMDUR) 30 mg 24 hr tablet, Take 1 tablet (30 mg total) by mouth daily, Disp: 90 tablet, Rfl: 3    losartan (COZAAR) 100 MG tablet, TAKE 1 TABLET BY MOUTH EVERY DAY, Disp: 90 tablet, Rfl: 3    nitroglycerin (NITROSTAT) 0.4 mg SL tablet, Place 1 tablet (0.4 mg total) under the tongue every 5 (five) minutes as needed for chest pain (Patient taking differently: Place 0.4 mg under the tongue every 5 (five) minutes as needed for chest pain Takes occasionally), Disp: 50 tablet, Rfl: 2    Omega-3 Fatty Acids (FISH OIL PO), Take 1,000 mg by mouth daily, Disp: , Rfl:     ranolazine (RANEXA) 1000 MG SR tablet, TAKE 1 TABLET (1,000 MG TOTAL) BY MOUTH EVERY 12 HOURS, Disp: 180 tablet, Rfl: 2    ZINC GLUCONATE PO, Take 50 mg by mouth daily, Disp: , Rfl:       The following portions of the patient's history were reviewed and updated as appropriate: past medical history, past surgical history, family history, social history, allergies, current medications and active problem list.      Review of Systems:  Constitutional: Denies fever, chills, weight gain, weight loss, fatigue  Eyes: Denies eye redness, eye discharge, double vision, change in visual acuity  ENT: Denies hearing loss, tinnitus, sneezing, nasal congestion, nasal discharge, sore throat   Respiratory: Denies cough, expectoration, hemoptysis, has exertional shortness of breath  Cardiovascular: Denies chest pain, palpitations, has lower extremity swelling, orthopnea  Gastrointestinal: Denies abdominal pain, heartburn, nausea, vomiting, hematemesis, diarrhea, bloody stools  Genito-Urinary: Denies dysuria, frequency, difficulty in micturition, nocturia, incontinence  Musculoskeletal: Denies back pain, has bilateral knee joint pain,  muscle pain  Neurologic: Denies confusion, lightheadedness, syncope, headache, focal weakness, sensory changes, seizures  Endocrine: Denies polyuria, polydipsia, temperature intolerance  Allergy and Immunology: Denies hives, insect bite sensitivity  Hematological and Lymphatic: Denies bleeding problems, swollen glands   Psychological: Complains of depression, denies suicidal ideation, anxiety, panic, mood swings  Dermatological: Denies pruritus, rash, skin lesion changes      Vitals:  Vitals:    01/31/24 1022   BP: 118/63   Pulse:    Resp:    Temp:    SpO2:        Body mass index is 44.69 kg/m².    Weight (last 2 days)       Date/Time Weight    01/31/24 1007 137 (302.6)              Physical Examination:  General: Patient is not in acute distress. Awake, alert, responding to commands. No weight gain or loss  Head: Normocephalic. Atraumatic  Eyes: Conjunctiva and lids with no swelling, erythema or discharge. Both pupils normal sized, round and reactive to light. Sclera nonicteric  ENT: External examination of nose and ear normal. Otoscopic examination shows translucent tympanic membranes with patent canals without erythema. Oropharynx moist with no erythema, edema, exudate or lesions  Neck: Supple. JVP not raised. Trachea midline. No masses. No thyromegaly  Lungs: No signs of increased work of breathing or respiratory distress. Bilateral bronchovascular breath sounds with no crackles or rhonchi  Chest wall: No tenderness  Cardiovascular: Normal PMI. No thrills. Regular rate and rhythm. S1 and S2 normal.  Has an aortic stenosis murmurGastrointestinal: Abdomen soft, nontender. No guarding or rigidity. Liver and spleen not palpable. Bowel sounds present  Neurologic: Cranial nerves II-XII intact. Cortical functions normal. Motor system - Reflexes 2+ and symmetrical. Sensations normal  Musculoskeletal: Gait normal.  Has bilateral knee joint tenderness  Integumentary: Skin normal with no rash or lesions  Lymphatic: No  "palpable lymph nodes in neck, axilla or groin  Extremities: No clubbing, cyanosis, edema or varicosities  Psychological: Judgement and insight normal.  Depressed      Laboratory Results:  CBC with diff:   Lab Results   Component Value Date    WBC 7.89 01/06/2023    RBC 4.30 01/06/2023    HGB 13.3 01/06/2023    HCT 40.8 01/06/2023    MCV 95 01/06/2023    MCH 30.9 01/06/2023    RDW 15.1 01/06/2023     01/06/2023       CMP:  Lab Results   Component Value Date    CREATININE 0.99 01/06/2023    BUN 21 01/06/2023    K 3.9 01/06/2023     01/06/2023    CO2 26 01/06/2023    ALKPHOS 87 01/06/2023    ALT 49 01/06/2023    ALT 35 02/26/2019    AST 27 01/06/2023    AST 26 02/26/2019    BILIDIR 0.30 (H) 09/15/2021       Lab Results   Component Value Date    HGBA1C 5.3 01/06/2023    MG 2.1 09/16/2021       Lab Results   Component Value Date    TROPONINI <0.02 09/12/2021       Lipid Profile:   No results found for: \"CHOL\"  Lab Results   Component Value Date    HDL 45 01/06/2023     Lab Results   Component Value Date    LDLCALC 29 01/06/2023     Lab Results   Component Value Date    TRIG 234 (H) 01/06/2023       Imaging Results:  FL spine and pain procedure  Indication:  Knee pain  Preoperative diagnosis:  1. Genicular neuritis of the knee             2.  Knee pain  Postoperative diagnosis: 1. Genicular neuritis of the knee             2.  Knee pain    Procedure:  Fluoroscopically-guided Radiofrequency denervation of the left   knee joint    EBL:  none  Specimens:  not applicable    After discussing the risks, benefits, and alternatives to the procedure,   the patient expressed understanding and wished to proceed.  The patient   was brought to the fluoroscopy suite and placed in the supine position.    Procedural pause conducted to verify:  correct patient identity, procedure   to be performed and as applicable, correct side and site, correct patient   position, and availability of implants, special equipment and special "   requirements.  Using fluoroscopy, AP and lateral visualization was   utilized to identify the femoral and tibial epicondylar to shaft junctions   of the knee.  The skin was sterilely prepped and draped in the usual   fashion using Chloraprep skin prep.  The skin and subcutaneous tissue over   the locations of the superior medial and lateral genicular nerves as well   as the inferior medial genicular nerve were anesthetized with 0.5%   lidocaine.   The location of such points was performed so as to allow a   superficial to deep trajectory directly overlying the shaft to epicondylar   junction of the femur and tibia.  A 10cm 18 gauge 10 mm active tip   radiofrequency cannula was then inserted through each skin wheal down to   contact os at the shaft/epicondylar junction at all three locations.  AP   and lateral fluoroscopy were used to ensure the needle tip was at the   mid-shaft from a superior to inferior standpoint as well as in the   epicondylar/shaft junction in AP views.  Once this was completed motor   testing was performed with no motor twitches occurring.  At this point, 2%   Lidocaine and 0.25% bupivacaine was injected and the radiofrequency   generator was set for 90 degrees at 90 second lesions performed over all   nerves.  After RF treatment, each probe and cannula was removed.    The patient tolerated the procedure well and there were no apparent   complications.  After appropriate observation, the patient was dismissed   from the clinic in good condition under their own power.       Health Maintenance:  Health Maintenance   Topic Date Due    Annual Physical  Never done    Colorectal Cancer Screening  Never done    Zoster Vaccine (2 of 3) 03/10/2017    Depression Screening  11/02/2022    Influenza Vaccine (1) 09/01/2023    COVID-19 Vaccine (4 - 2023-24 season) 09/01/2023    Fall Risk  11/15/2023    DTaP,Tdap,and Td Vaccines (2 - Td or Tdap) 11/09/2028    Hepatitis C Screening  Completed    Pneumococcal  Vaccine: 65+ Years  Completed    HIB Vaccine  Aged Out    IPV Vaccine  Aged Out    Hepatitis A Vaccine  Aged Out    Meningococcal ACWY Vaccine  Aged Out    HPV Vaccine  Aged Out     Immunization History   Administered Date(s) Administered    COVID-19 MODERNA VACC 0.5 ML IM 04/16/2021, 05/14/2021, 11/23/2021    Hep A, adult 11/09/2018    INFLUENZA 11/22/2010, 01/21/2013, 10/27/2014, 10/02/2015    Influenza Quadrivalent Preservative Free 3 years and older IM 01/13/2017, 11/20/2017, 03/01/2019    Influenza, Seasonal Vaccine, Quadrivalent, Adjuvanted, .5e 01/06/2021    Influenza, high dose seasonal 0.7 mL 09/23/2021, 01/06/2023    Influenza, seasonal, injectable 1955, 10/31/2006    Pneumococcal Conjugate 13-Valent 01/13/2017    Pneumococcal Polysaccharide PPV23 01/13/2017, 01/06/2021    Tdap 11/09/2018    Zoster 01/13/2017         Delbert Ellsworth MD  1/31/2024,10:46 AM

## 2024-02-09 DIAGNOSIS — I25.118 CORONARY ARTERY DISEASE OF NATIVE ARTERY OF NATIVE HEART WITH STABLE ANGINA PECTORIS (HCC): ICD-10-CM

## 2024-02-09 RX ORDER — CLOPIDOGREL BISULFATE 75 MG/1
TABLET ORAL
Qty: 90 TABLET | Refills: 1 | Status: SHIPPED | OUTPATIENT
Start: 2024-02-09

## 2024-02-28 ENCOUNTER — TELEPHONE (OUTPATIENT)
Age: 69
End: 2024-02-28

## 2024-02-28 DIAGNOSIS — R19.5 POSITIVE COLORECTAL CANCER SCREENING USING COLOGUARD TEST: Primary | ICD-10-CM

## 2024-02-28 LAB — COLOGUARD RESULT REPORTABLE: POSITIVE

## 2024-02-28 NOTE — TELEPHONE ENCOUNTER
----- [facility] sent at 2/28/2024 10:43 AM EST -----  Cologuard testing was POSITIVE. Because of this, patient needs further evaluation with a colonoscopy to investigate. I placed a referral to gastroenterology.

## 2024-02-28 NOTE — TELEPHONE ENCOUNTER
----- [facility] at 2/28/2024 10:43 AM EST -----  Cologuard testing was POSITIVE. Because of this, patient needs further evaluation with a colonoscopy to investigate. I placed a referral to gastroenterology.

## 2024-02-28 NOTE — TELEPHONE ENCOUNTER
Tried calling patient. Unable to leave message. As mail box full  [address]  [facility] [address]  Phone: [phone]  Fax: [phone] Date: 2024      [facility] [address]  [phone]    MRN: [id number]  : 1955  SEX: M         INSURANCE PAYOR PLAN GROUP # SUBSCRIBER ID   Primary:    [facility] [id number]       [id number]   [id number]      Secondary:       MEDICARE    [id number]          [id number]         Ambulatory referral to Gastroenterology   Diagnosis:  Positive colorectal cancer screening using Cologuard test (R19.5 [ICD-10-CM])            Referral Information:   # Visits:   Referral Type: Consult - AMB [[id number]]   Urgency:  Routine Referral Reason: Specialty Services Required   Order Date: 2024 End Date:  To be determined by Insurer   Start Date:         Referred To:    Trae King MD  Phone: [phone]  Fax: [phone] [address]  [address]  [facility] [address]                       Referring Provider Information:   Delbert Ellsworth Phone: [phone] Fax: [phone]        Electronically signed by: Delbert Ellsworth MD       This document serves as a request of services and does not constitute Insurance authorization or approval of services.  To determine eligibility, please contact the members Insurance carrier to verify and review coverage.

## 2024-02-28 NOTE — TELEPHONE ENCOUNTER
Pt aware of results and refusing colonoscopy the patient did not want to discuss and I advised I would relay his message.No colonoscopy.

## 2024-03-02 DIAGNOSIS — Z98.61 CAD S/P PERCUTANEOUS CORONARY ANGIOPLASTY: ICD-10-CM

## 2024-03-02 DIAGNOSIS — I25.10 CAD S/P PERCUTANEOUS CORONARY ANGIOPLASTY: ICD-10-CM

## 2024-03-06 RX ORDER — ISOSORBIDE MONONITRATE 30 MG/1
30 TABLET, EXTENDED RELEASE ORAL DAILY
Qty: 90 TABLET | Refills: 1 | Status: SHIPPED | OUTPATIENT
Start: 2024-03-06

## 2024-05-01 ENCOUNTER — TELEPHONE (OUTPATIENT)
Age: 69
End: 2024-05-01

## 2024-05-01 NOTE — TELEPHONE ENCOUNTER
Carlos Alberto called about dental procedure and medication hold, advised Carlos Alberto has been faxed over to the office, pt verbally understood

## 2024-05-14 ENCOUNTER — TELEPHONE (OUTPATIENT)
Age: 69
End: 2024-05-14

## 2024-05-14 NOTE — TELEPHONE ENCOUNTER
Left vm with pt about appt with Dr Ellsworth. Is he following her to [facility] office or staying with [facility]    Dr Ellsworth's last day at [facility] is: 6/30  P# for [facility] is: [phone]

## 2024-06-04 ENCOUNTER — APPOINTMENT (OUTPATIENT)
Age: 69
End: 2024-06-04
Payer: COMMERCIAL

## 2024-06-04 ENCOUNTER — OFFICE VISIT (OUTPATIENT)
Dept: CARDIOLOGY CLINIC | Facility: CLINIC | Age: 69
End: 2024-06-04
Payer: COMMERCIAL

## 2024-06-04 ENCOUNTER — CONSULT (OUTPATIENT)
Age: 69
End: 2024-06-04
Payer: COMMERCIAL

## 2024-06-04 VITALS
WEIGHT: 299.8 LBS | TEMPERATURE: 96.2 F | DIASTOLIC BLOOD PRESSURE: 80 MMHG | RESPIRATION RATE: 14 BRPM | HEART RATE: 58 BPM | BODY MASS INDEX: 44.4 KG/M2 | SYSTOLIC BLOOD PRESSURE: 110 MMHG | OXYGEN SATURATION: 96 % | HEIGHT: 69 IN

## 2024-06-04 VITALS
HEART RATE: 71 BPM | DIASTOLIC BLOOD PRESSURE: 68 MMHG | OXYGEN SATURATION: 93 % | SYSTOLIC BLOOD PRESSURE: 116 MMHG | RESPIRATION RATE: 16 BRPM | BODY MASS INDEX: 34.66 KG/M2 | HEIGHT: 69 IN | WEIGHT: 234 LBS

## 2024-06-04 DIAGNOSIS — I25.10 CAD S/P PERCUTANEOUS CORONARY ANGIOPLASTY: ICD-10-CM

## 2024-06-04 DIAGNOSIS — I10 PRIMARY HYPERTENSION: ICD-10-CM

## 2024-06-04 DIAGNOSIS — G47.33 OBSTRUCTIVE SLEEP APNEA: ICD-10-CM

## 2024-06-04 DIAGNOSIS — I25.118 CORONARY ARTERY DISEASE OF NATIVE ARTERY OF NATIVE HEART WITH STABLE ANGINA PECTORIS (HCC): ICD-10-CM

## 2024-06-04 DIAGNOSIS — G47.33 OSA (OBSTRUCTIVE SLEEP APNEA): ICD-10-CM

## 2024-06-04 DIAGNOSIS — E78.00 PURE HYPERCHOLESTEROLEMIA: ICD-10-CM

## 2024-06-04 DIAGNOSIS — F32.A DEPRESSIVE DISORDER: ICD-10-CM

## 2024-06-04 DIAGNOSIS — M17.12 PRIMARY OSTEOARTHRITIS OF LEFT KNEE: ICD-10-CM

## 2024-06-04 DIAGNOSIS — E66.01 MORBID OBESITY DUE TO EXCESS CALORIES (HCC): ICD-10-CM

## 2024-06-04 DIAGNOSIS — I35.0 NONRHEUMATIC AORTIC VALVE STENOSIS: ICD-10-CM

## 2024-06-04 DIAGNOSIS — R60.0 LOCALIZED EDEMA: ICD-10-CM

## 2024-06-04 DIAGNOSIS — I10 ESSENTIAL HYPERTENSION: ICD-10-CM

## 2024-06-04 DIAGNOSIS — I50.32 CHRONIC DIASTOLIC CONGESTIVE HEART FAILURE (HCC): ICD-10-CM

## 2024-06-04 DIAGNOSIS — Z95.1 HX OF CABG: ICD-10-CM

## 2024-06-04 DIAGNOSIS — Z98.61 CAD S/P PERCUTANEOUS CORONARY ANGIOPLASTY: ICD-10-CM

## 2024-06-04 DIAGNOSIS — Z01.818 PRE-OP EXAMINATION: Primary | ICD-10-CM

## 2024-06-04 DIAGNOSIS — E78.2 MIXED HYPERLIPIDEMIA: ICD-10-CM

## 2024-06-04 DIAGNOSIS — Z01.810 PREOP CARDIOVASCULAR EXAM: Primary | ICD-10-CM

## 2024-06-04 DIAGNOSIS — N28.9 RENAL LESION: ICD-10-CM

## 2024-06-04 PROBLEM — I20.0 UNSTABLE ANGINA (HCC): Status: RESOLVED | Noted: 2023-02-22 | Resolved: 2024-06-04

## 2024-06-04 LAB
ALBUMIN SERPL BCP-MCNC: 4.3 G/DL (ref 3.5–5)
ALP SERPL-CCNC: 80 U/L (ref 34–104)
ALT SERPL W P-5'-P-CCNC: 21 U/L (ref 7–52)
ANION GAP SERPL CALCULATED.3IONS-SCNC: 8 MMOL/L (ref 4–13)
AST SERPL W P-5'-P-CCNC: 21 U/L (ref 13–39)
BASOPHILS # BLD AUTO: 0.06 THOUSANDS/ÂΜL (ref 0–0.1)
BASOPHILS NFR BLD AUTO: 1 % (ref 0–1)
BILIRUB SERPL-MCNC: 0.64 MG/DL (ref 0.2–1)
BUN SERPL-MCNC: 19 MG/DL (ref 5–25)
CALCIUM SERPL-MCNC: 9.4 MG/DL (ref 8.4–10.2)
CHLORIDE SERPL-SCNC: 103 MMOL/L (ref 96–108)
CHOLEST SERPL-MCNC: 114 MG/DL
CO2 SERPL-SCNC: 29 MMOL/L (ref 21–32)
CREAT SERPL-MCNC: 1.21 MG/DL (ref 0.6–1.3)
EOSINOPHIL # BLD AUTO: 0.13 THOUSAND/ÂΜL (ref 0–0.61)
EOSINOPHIL NFR BLD AUTO: 2 % (ref 0–6)
ERYTHROCYTE [DISTWIDTH] IN BLOOD BY AUTOMATED COUNT: 14.3 % (ref 11.6–15.1)
GFR SERPL CREATININE-BSD FRML MDRD: 61 ML/MIN/1.73SQ M
GLUCOSE P FAST SERPL-MCNC: 74 MG/DL (ref 65–99)
HCT VFR BLD AUTO: 40.3 % (ref 36.5–49.3)
HDLC SERPL-MCNC: 41 MG/DL
HGB BLD-MCNC: 13.4 G/DL (ref 12–17)
IMM GRANULOCYTES # BLD AUTO: 0.03 THOUSAND/UL (ref 0–0.2)
IMM GRANULOCYTES NFR BLD AUTO: 0 % (ref 0–2)
LDLC SERPL CALC-MCNC: 53 MG/DL (ref 0–100)
LYMPHOCYTES # BLD AUTO: 1.96 THOUSANDS/ÂΜL (ref 0.6–4.47)
LYMPHOCYTES NFR BLD AUTO: 28 % (ref 14–44)
MCH RBC QN AUTO: 31.5 PG (ref 26.8–34.3)
MCHC RBC AUTO-ENTMCNC: 33.3 G/DL (ref 31.4–37.4)
MCV RBC AUTO: 95 FL (ref 82–98)
MONOCYTES # BLD AUTO: 0.84 THOUSAND/ÂΜL (ref 0.17–1.22)
MONOCYTES NFR BLD AUTO: 12 % (ref 4–12)
NEUTROPHILS # BLD AUTO: 3.92 THOUSANDS/ÂΜL (ref 1.85–7.62)
NEUTS SEG NFR BLD AUTO: 57 % (ref 43–75)
NONHDLC SERPL-MCNC: 73 MG/DL
NRBC BLD AUTO-RTO: 0 /100 WBCS
PLATELET # BLD AUTO: 254 THOUSANDS/UL (ref 149–390)
PMV BLD AUTO: 10.7 FL (ref 8.9–12.7)
POTASSIUM SERPL-SCNC: 4.3 MMOL/L (ref 3.5–5.3)
PROT SERPL-MCNC: 7.1 G/DL (ref 6.4–8.4)
RBC # BLD AUTO: 4.25 MILLION/UL (ref 3.88–5.62)
SODIUM SERPL-SCNC: 140 MMOL/L (ref 135–147)
TRIGL SERPL-MCNC: 98 MG/DL
TSH SERPL DL<=0.05 MIU/L-ACNC: 1.4 UIU/ML (ref 0.45–4.5)
WBC # BLD AUTO: 6.94 THOUSAND/UL (ref 4.31–10.16)

## 2024-06-04 PROCEDURE — 36415 COLL VENOUS BLD VENIPUNCTURE: CPT

## 2024-06-04 PROCEDURE — 93000 ELECTROCARDIOGRAM COMPLETE: CPT | Performed by: INTERNAL MEDICINE

## 2024-06-04 PROCEDURE — 85025 COMPLETE CBC W/AUTO DIFF WBC: CPT

## 2024-06-04 PROCEDURE — 99214 OFFICE O/P EST MOD 30 MIN: CPT | Performed by: INTERNAL MEDICINE

## 2024-06-04 PROCEDURE — 80061 LIPID PANEL: CPT

## 2024-06-04 PROCEDURE — 84443 ASSAY THYROID STIM HORMONE: CPT

## 2024-06-04 PROCEDURE — 80053 COMPREHEN METABOLIC PANEL: CPT

## 2024-06-04 NOTE — PROGRESS NOTES
CARDIOLOGY OFFICE VISIT  [facility]  [address] [place] [address], [place] [id number]  Tel: [phone]      NAME: Otto Okeefe  AGE: 68 y.o.  SEX: male  : 1955  MRN: [id number]      Chief Complaint:  Chief Complaint   Patient presents with    Follow-up         History of Present Illness:   Patient comes for preop cardiac risk assessment prior to left knee surgery and as well as for a follow-up which he has not had for over a year.  States the knees do not let him walk much but he gets tired and short of breath if he walks a short distance.  Denies associated chest pain/pressure. Also denies palpitations, lightheadedness, syncope, orthopnea, PND, claudication.  Swelling feet finally got controlled on furosemide 40 mg twice a day    Chronic diastolic heart failure - on furosemide 40 mg BD, carvedilol, losartan.  States he tries to watch his salt intake     CAD s/p CABG x2 in  (LIMA to LAD, known to be occluded SVG to OM 2); s/p bifurcating stents from ostial LCx to mid LCx and OM1 in ; s/p KATERINE from left main to LCx in Dec 2020; small vessel CAD in distal LAD distribution - on Plavix, statin, carvedilol, losartan, Imdur, Ranexa.       Mild to moderate aortic stenosis per DEBBIE at [facility] 2023  The aortic valve is trileaflet. Moderately calcified aortic valve leaflets. There is reduced excursion of aortic valve leaflets. Mild to moderate aortic stenosis. The peak transaortic valve gradient is 45 mmHg. The mean transaortic valve gradient is 26 mmHg. The aortic valve area is 1.83 cm2 (by continuity equation). The aortic valve area is 1.4 cm2 (by 3D planimetry). The dimensionless (VTI) index is 0.44 (severe <0.25). LVOT diameter is 2.3 cm. Minimal aortic regurgitation.     Essential hypertension -  Has been hypertensive for many years.  Taking medications regularly.  Denies lightheadedness, headache, medication side effects.       Mixed hyperlipidemia -  Has had hyperlipidemia for many years.  Taking statin regularly along with diet control.  Denies myalgia.  PCP closely monitoring the blood work.    GUME - on CPAP therapy    Morbid Obesity      Past Medical History:  Past Medical History:   Diagnosis Date    Arthritis 2008    Cough     Heart disease 2000    HTN (hypertension)     Hyperlipidemia     Hypertension 2000    Osteoarthritis 2002    Sleep apnea, obstructive     SOB (shortness of breath)     Wheezing          Past Surgical History:  Past Surgical History:   Procedure Laterality Date    CARDIAC CATHETERIZATION Left 1/27/2023    Procedure: Cardiac Left Heart Cath;  Surgeon: Joss Justice MD;  Location: [facility];  Service: Cardiology    CARDIAC CATHETERIZATION  1/27/2023    Procedure: Cardiac catheterization;  Surgeon: Joss Justice MD;  Location: [facility];  Service: Cardiology    CORONARY ANGIOPLASTY WITH STENT PLACEMENT N/A     CORONARY ARTERY BYPASS GRAFT      12 years ago    KNEE CARTILAGE SURGERY Left     left knee meniscus repair         Family History:  Family History   Problem Relation Age of Onset    Heart disease Father     Arthritis Father     Hypertension Father     Stroke Father     Arthritis Paternal Grandfather          Social History:  Social History     Socioeconomic History    Marital status: /Civil Union     Spouse name: None    Number of children: None    Years of education: None    Highest education level: None   Occupational History    Occupation: employed   Tobacco Use    Smoking status: Never    Smokeless tobacco: Never    Tobacco comments:     Never Used   Vaping Use    Vaping status: Never Used   Substance and Sexual Activity    Alcohol use: Yes     Alcohol/week: 7.0 standard drinks of alcohol     Types: 5 Cans of beer, 2 Shots of liquor per week     Comment: minor    Drug use: Never    Sexual activity: Not Currently     Partners: Female     Birth control/protection: Female  Sterilization   Other Topics Concern    None   Social History Narrative    None     Social Determinants of Health     Financial Resource Strain: Not on file   Food Insecurity: Not on file   Transportation Needs: Unknown (9/13/2021)    PRAPARE - Transportation     Lack of Transportation (Medical): Not on file     Lack of Transportation (Non-Medical): No   Physical Activity: Not on file   Stress: No Stress Concern Present (1/6/2023)    [employer] [place] [employer] - Occupational Stress Questionnaire     Feeling of Stress : Only a little   Social Connections: Not on file   Intimate Partner Violence: Not on file   Housing Stability: Not on file         Active Problems:  Patient Active Problem List   Diagnosis    Pulmonary embolism (HCC)    Coronary artery disease of native artery of native heart with stable angina pectoris (HCC)    Chronic diastolic congestive heart failure (HCC)    Varicose veins of right lower extremity    Renal lesion    Obesity hypoventilation syndrome (HCC)    Morbid obesity (HCC)    Moderate aortic valve stenosis    Obstructive sleep apnea    Primary osteoarthritis of left knee    Pure hypercholesterolemia    S/P angioplasty with stent    Seasonal allergic rhinitis due to pollen    Venous insufficiency    Hx of CABG    Localized edema    Depressive disorder    Cor pulmonale, chronic (HCC)    Chronic pain of both shoulders    Bradycardia    Nonrheumatic mitral valve regurgitation    Unstable angina (HCC)    Primary osteoarthritis of both knees    Hypertension    Neuropathy    Moderate episode of recurrent major depressive disorder (HCC)         The following portions of the patient's history were reviewed and updated as appropriate: past medical history, past surgical history, past family history,  past social history, current medications, allergies and problem list.      Review of Systems:  Constitutional: Denies fever, chills  Eyes: Denies eye redness, eye discharge  ENT: Denies  sneezing, nasal discharge, sore throat   Respiratory: Denies cough, expectoration. +shortness of breath  Cardiovascular: Denies chest pain, palpitations. +lower extremity swelling  Gastrointestinal: Denies abdominal pain, nausea, vomiting, diarrhea  Genito-Urinary: Denies dysuria  Musculoskeletal: + b/l knee pain  Neurologic: Denies lightheadedness, syncope, headache, seizures  Endocrine: Denies polydipsia, temperature intolerance  Allergy and Immunology: Denies hives, insect bite sensitivity  Hematological and Lymphatic: Denies bleeding problems, swollen glands   Psychological: Denies suicidal ideation, panic  Dermatological: Denies pruritus, rash, skin lesion changes      Vitals:  Vitals:    06/04/24 0931   BP: 116/68   Pulse: 71   Resp: 16   SpO2: 93%       Body mass index is 34.56 kg/m².    Weight (last 2 days)       Date/Time Weight    06/04/24 0931 106 (234)              Physical Examination:  General: Patient is not in acute distress. Awake, alert, oriented in time, place and person. Responding to commands.  Morbidly obese.  Using a cane for support  Head: Normocephalic. Atraumatic  Eyes: Both pupils normal sized, round and reactive to light. Nonicteric  ENT: Normal external ear canals  Neck: Supple. JVP not raised. Trachea central. No thyromegaly  Lungs: Bilateral bronchovascular breath sounds with no crackles or rhonchi  Chest wall: No tenderness  Cardiovascular: RRR. S1 and S2 normal. Grade 3/6 JONAH at base+  Gastrointestinal: Abdomen soft, nontender. No guarding or rigidity. Liver and spleen not palpable. Bowel sounds present  Neurologic: Patient is awake, alert, oriented in time, place and person. Responding to commands. Moving all extremities  Integumentary:  No skin rash  Lymphatic: No cervical lymphadenopathy  Back: Symmetric. No CVA tenderness  Extremities: No clubbing, cyanosis. B/L mild LE edema      Laboratory Results:  CBC with diff:   Lab Results   Component Value Date    WBC 7.47 01/31/2024     "RBC 4.46 01/31/2024    HGB 13.8 01/31/2024    HCT 42.0 01/31/2024    MCV 94 01/31/2024    MCH 30.9 01/31/2024    RDW 13.9 01/31/2024     01/31/2024       CMP:  Lab Results   Component Value Date    CREATININE 0.85 01/31/2024    CREATININE 0.75 12/23/2020    BUN 18 01/31/2024    BUN 16 12/23/2020    K 4.3 01/31/2024    K 3.6 12/23/2020     01/31/2024     12/23/2020    CO2 24 01/31/2024    CO2 27 12/23/2020    ALKPHOS 77 01/31/2024    ALKPHOS 64 12/11/2020    ALT 32 01/31/2024    ALT 34 12/11/2020    AST 26 01/31/2024    AST 25 12/11/2020    BILIDIR 0.30 (H) 09/15/2021       Lab Results   Component Value Date    HGBA1C 5.3 01/06/2023    MG 2.1 09/16/2021       Lab Results   Component Value Date    TROPONINI <0.02 09/12/2021    TROPONINI <0.03 12/12/2020    TROPONINI <0.03 12/11/2020    TROPONINI <0.03 12/11/2020       Lipid Profile:   No results found for: \"CHOL\"  Lab Results   Component Value Date    HDL 43 01/31/2024    HDL 45 01/06/2023     Lab Results   Component Value Date    LDLCALC 63 01/31/2024    LDLCALC 29 01/06/2023     Lab Results   Component Value Date    TRIG 98 01/31/2024    TRIG 234 (H) 01/06/2023       Cardiac testing:   Results for orders placed during the hospital encounter of 01/11/23    Echo complete w/ contrast if indicated    Interpretation Summary    Left Ventricle: Left ventricular cavity size is normal. Wall thickness is normal. The left ventricular ejection fraction is 55%. Systolic function is normal. Wall motion is normal. Diastolic function is normal.    Aortic Valve: The leaflets are moderately calcified. There is moderate to severe stenosis.  Peak gradient 47 mmHg and mean gradient 23 mmHg.  No major change compared to previous echocardiogram from April 2022    Mitral Valve: There is mild annular calcification.    Aorta: The ascending aorta is mildly dilated.4.0 cm.      EKG: Reviewed by me.  6/4/2024.  Sinus bradycardia.  52 bpm. ST and T wave abnormality, consider " lateral ischemia.      Medications:    Current Outpatient Medications:     Ascorbic Acid (VITAMIN C PO), Take 100 mg by mouth daily, Disp: , Rfl:     atorvastatin (LIPITOR) 40 mg tablet, TAKE 1 TABLET BY MOUTH EVERY DAY WITH DINNER, Disp: 90 tablet, Rfl: 3    buPROPion (WELLBUTRIN XL) 150 mg 24 hr tablet, TAKE 1 TABLET BY MOUTH EVERY DAY IN THE MORNING, Disp: 90 tablet, Rfl: 2    carvedilol (COREG) 25 mg tablet, TAKE 1 TABLET BY MOUTH TWICE A DAY WITH FOOD, Disp: 180 tablet, Rfl: 3    clopidogrel (PLAVIX) 75 mg tablet, TAKE 1 TABLET BY MOUTH EVERY DAY, Disp: 90 tablet, Rfl: 1    DULoxetine (CYMBALTA) 60 mg delayed release capsule, TAKE 1 CAPSULE BY MOUTH EVERY DAY, Disp: 90 capsule, Rfl: 3    furosemide (LASIX) 40 mg tablet, Take 1 tablet (40 mg total) by mouth 2 (two) times a day, Disp: 180 tablet, Rfl: 3    gabapentin (NEURONTIN) 400 mg capsule, Take 1 capsule (400 mg total) by mouth 2 (two) times a day, Disp: 180 capsule, Rfl: 3    isosorbide mononitrate (IMDUR) 30 mg 24 hr tablet, TAKE 1 TABLET BY MOUTH EVERY DAY, Disp: 90 tablet, Rfl: 1    losartan (COZAAR) 100 MG tablet, TAKE 1 TABLET BY MOUTH EVERY DAY, Disp: 90 tablet, Rfl: 3    nitroglycerin (NITROSTAT) 0.4 mg SL tablet, Place 1 tablet (0.4 mg total) under the tongue every 5 (five) minutes as needed for chest pain (Patient taking differently: Place 0.4 mg under the tongue every 5 (five) minutes as needed for chest pain Takes occasionally), Disp: 50 tablet, Rfl: 2    Omega-3 Fatty Acids (FISH OIL PO), Take 1,000 mg by mouth daily, Disp: , Rfl:     ranolazine (RANEXA) 1000 MG SR tablet, TAKE 1 TABLET (1,000 MG TOTAL) BY MOUTH EVERY 12 HOURS, Disp: 180 tablet, Rfl: 2    ZINC GLUCONATE PO, Take 50 mg by mouth daily, Disp: , Rfl:       Allergies:  Allergies   Allergen Reactions    Medical Tape Other (See Comments) and Rash    Sulfa Antibiotics Other (See Comments)    Fosinopril Cough     Cough      Lisinopril Cough    Wound Dressing Adhesive Dermatitis and Rash          Assessment and Plan:  1. Preop cardiovascular exam  Patient has no active cardiac conditions (such as unstable cardiac syndrome, decompensated heart failure, significant arrhythmias, severe valvular disease) and has CAD, CHF but no clinical risk factors of diabetes mellitus, renal insufficiency, CVA). Patient is at least a moderate cardiac risk patient going in for a knee surgery.  No further cardiac workup is needed at this time.  No cardiac contraindications for surgery.  Perioperative use of beta-blocker is highly recommended.    - POCT ECG    2. Chronic diastolic congestive heart failure (HCC)  Continue furosemide 40 mg twice daily, carvedilol 25 mg twice daily, losartan 100 mg daily.  Low-salt diet.  Daily weights    3. CAD S/P percutaneous coronary angioplasty  4. Hx of CABG  Last cardiac catheterization findings discussed with the patient in detail   Continue clopidogrel 75 mg daily, carvedilol 25 mg twice daily, atorvastatin 40 mg daily, Imdur 30 mg daily, Ranexa 1000 mg twice daily    5. Nonrheumatic aortic valve stenosis  [facility] from [facility] reviewed with the patient    6. Essential hypertension  BP stable.  Continue carvedilol 25 mg twice daily, furosemide 40 mg twice daily, losartan 100 mg daily, Imdur 30 mg daily.  Continue to monitor BP at home and call if abnormal    7. Mixed hyperlipidemia  Continue atorvastatin 40 mg daily along with diet control.  His PCP closely monitors his blood work    8. Morbid obesity due to excess calories (HCC)    9. GUME (obstructive sleep apnea)  Regular CPAP use    Recommend aggressive risk factor modification and therapeutic lifestyle changes.  Low-salt, low-calorie, low-fat, low-cholesterol diet with regular exercise and to optimize weight.  I will defer the ordering and monitoring of necessity lab studies to you, but I am available and happy to review and manage any of the data at your request in the future.    Discussed concepts of atherosclerosis,  including signs and symptoms of cardiac disease.    Previous studies were reviewed.    Safety measures were reviewed.  Questions were entertained and answered.  Patient was advised to report any problems requiring medical attention.    Follow-up with PCP and appropriate specialist and lab work as discussed.    Return for follow up visit as scheduled or earlier, if needed.  Thank you for allowing me to participate in the care and evaluation of your patient.  Should you have any questions, please feel free to contact me.    Leisa Ellsworth MD  6/4/2024,10:21 AM

## 2024-06-04 NOTE — PROGRESS NOTES
INTERNAL MEDICINE PRE-OPERATIVE EVALUATION  [facility] PHYSICIAN GROUP - [facility] [place]    NAME: Otto Okeefe    AGE: 68 y.o.   SEX: male  : 1955     DATE: 2024    Internal Medicine Pre-Operative Evaluation      Chief Complaint: Pre-operative Evaluation     Surgery: Left knee replacement  Anticipated Date of Surgery: 2024  Referring Provider: Qamar Edwards DO       History of Present Illness:     Otto Okeefe is a 68 y.o. male who presents to the office today for a preoperative consultation at the request of surgeon, Dr. Jacob Edwards, who plans on performing left total knee replacement on 2024. Planned anesthesia is general. Current anti-platelet/anti-coagulation medications that the patient is prescribed includes: Clopidogrel (Plavix).      Assessment of Chronic Conditions:   - Coronary Artery Disease: Controlled on medication and follows up with cardiology  - Congestive Heart Failure: On diuretics and stable  - Hypertension: Well-controlled on medication     Assessment of Cardiac Risk:  Denies unstable or severe angina or MI in the last 6 weeks or history of stent placement in the last year   Denies decompensated heart failure (e.g. New onset heart failure, NYHA functional class IV heart failure, or worsening existing heart failure)  Denies significant arrhythmias such as high grade AV block, symptomatic ventricular arrhythmia, newly recognized ventricular tachycardia, supraventricular tachycardia with resting heart rate >100, or symptomatic bradycardia  Denies severe heart valve disease including aortic stenosis or symptomatic mitral stenosis     Exercise Capacity:  Able to walk 4 blocks without symptoms?: Yes  Able to walk 2 flights without symptoms?: Yes    Prior Anesthesia Reactions: No     Personal history of venous thromboembolic disease? No    History of steroid use for >2 weeks within last year? No    Review of Systems:     Review of Systems    Constitutional:  Negative for chills, diaphoresis, fatigue and fever.   HENT:  Negative for congestion, ear discharge, ear pain, hearing loss, postnasal drip, rhinorrhea, sinus pressure, sinus pain, sneezing, sore throat and voice change.    Eyes:  Negative for pain, discharge, redness and visual disturbance.   Respiratory:  Positive for shortness of breath. Negative for cough, chest tightness and wheezing.    Cardiovascular:  Negative for chest pain, palpitations and leg swelling.   Gastrointestinal:  Negative for abdominal distention, abdominal pain, blood in stool, constipation, diarrhea, nausea and vomiting.   Endocrine: Negative for cold intolerance, heat intolerance, polydipsia, polyphagia and polyuria.   Genitourinary:  Negative for dysuria, flank pain, frequency, hematuria and urgency.   Musculoskeletal:  Positive for arthralgias. Negative for back pain, gait problem, joint swelling, myalgias, neck pain and neck stiffness.   Skin:  Negative for rash.   Neurological:  Negative for dizziness, tremors, syncope, facial asymmetry, speech difficulty, weakness, light-headedness, numbness and headaches.   Hematological:  Does not bruise/bleed easily.   Psychiatric/Behavioral:  Negative for behavioral problems, confusion and sleep disturbance. The patient is not nervous/anxious.        Current Problem List:     Patient Active Problem List   Diagnosis    Pulmonary embolism (HCC)    Coronary artery disease of native artery of native heart with stable angina pectoris (HCC)    Chronic diastolic congestive heart failure (HCC)    Varicose veins of right lower extremity    Renal lesion    Obesity hypoventilation syndrome (HCC)    Morbid obesity (HCC)    Moderate aortic valve stenosis    Obstructive sleep apnea    Primary osteoarthritis of left knee    Pure hypercholesterolemia    S/P angioplasty with stent    Seasonal allergic rhinitis due to pollen    Venous insufficiency    Hx of CABG    Localized edema    Depressive  disorder    Cor pulmonale, chronic (HCC)    Chronic pain of both shoulders    Bradycardia    Nonrheumatic mitral valve regurgitation    Primary osteoarthritis of both knees    Hypertension    Neuropathy    Moderate episode of recurrent major depressive disorder (HCC)       Allergies:     Allergies   Allergen Reactions    Medical Tape Other (See Comments) and Rash    Sulfa Antibiotics Other (See Comments)    Fosinopril Cough     Cough      Lisinopril Cough    Wound Dressing Adhesive Dermatitis and Rash       Current Medications:       Current Outpatient Medications:     Ascorbic Acid (VITAMIN C PO), Take 100 mg by mouth daily, Disp: , Rfl:     atorvastatin (LIPITOR) 40 mg tablet, TAKE 1 TABLET BY MOUTH EVERY DAY WITH DINNER, Disp: 90 tablet, Rfl: 3    buPROPion (WELLBUTRIN XL) 150 mg 24 hr tablet, TAKE 1 TABLET BY MOUTH EVERY DAY IN THE MORNING, Disp: 90 tablet, Rfl: 2    carvedilol (COREG) 25 mg tablet, TAKE 1 TABLET BY MOUTH TWICE A DAY WITH FOOD, Disp: 180 tablet, Rfl: 3    clopidogrel (PLAVIX) 75 mg tablet, TAKE 1 TABLET BY MOUTH EVERY DAY, Disp: 90 tablet, Rfl: 1    DULoxetine (CYMBALTA) 60 mg delayed release capsule, TAKE 1 CAPSULE BY MOUTH EVERY DAY, Disp: 90 capsule, Rfl: 3    furosemide (LASIX) 40 mg tablet, Take 1 tablet (40 mg total) by mouth 2 (two) times a day, Disp: 180 tablet, Rfl: 3    gabapentin (NEURONTIN) 400 mg capsule, Take 1 capsule (400 mg total) by mouth 2 (two) times a day, Disp: 180 capsule, Rfl: 3    isosorbide mononitrate (IMDUR) 30 mg 24 hr tablet, TAKE 1 TABLET BY MOUTH EVERY DAY, Disp: 90 tablet, Rfl: 1    losartan (COZAAR) 100 MG tablet, TAKE 1 TABLET BY MOUTH EVERY DAY, Disp: 90 tablet, Rfl: 3    Omega-3 Fatty Acids (FISH OIL PO), Take 1,000 mg by mouth daily, Disp: , Rfl:     ranolazine (RANEXA) 1000 MG SR tablet, TAKE 1 TABLET (1,000 MG TOTAL) BY MOUTH EVERY 12 HOURS, Disp: 180 tablet, Rfl: 2    ZINC GLUCONATE PO, Take 50 mg by mouth daily, Disp: , Rfl:     nitroglycerin (NITROSTAT)  0.4 mg SL tablet, Place 1 tablet (0.4 mg total) under the tongue every 5 (five) minutes as needed for chest pain (Patient not taking: Reported on 6/4/2024), Disp: 50 tablet, Rfl: 2    Past Medical and Surgical History:       Past Medical History:   Diagnosis Date    Arthritis 2008    Cough     Heart disease 2000    HTN (hypertension)     Hyperlipidemia     Hypertension 2000    Osteoarthritis 2002    Sleep apnea, obstructive     SOB (shortness of breath)     Wheezing         Past Surgical History:   Procedure Laterality Date    CARDIAC CATHETERIZATION Left 1/27/2023    Procedure: Cardiac Left Heart Cath;  Surgeon: Joss Justice MD;  Location: [facility];  Service: Cardiology    CARDIAC CATHETERIZATION  1/27/2023    Procedure: Cardiac catheterization;  Surgeon: Joss Justice MD;  Location: [facility];  Service: Cardiology    CORONARY ANGIOPLASTY WITH STENT PLACEMENT N/A     CORONARY ARTERY BYPASS GRAFT      12 years ago    KNEE CARTILAGE SURGERY Left     left knee meniscus repair        Family History   Problem Relation Age of Onset    Heart disease Father     Arthritis Father     Hypertension Father     Stroke Father     Arthritis Paternal Grandfather         Social History     Socioeconomic History    Marital status: /Civil Union     Spouse name: Not on file    Number of children: Not on file    Years of education: Not on file    Highest education level: Not on file   Occupational History    Occupation: employed   Tobacco Use    Smoking status: Never     Passive exposure: Never    Smokeless tobacco: Never    Tobacco comments:     Never Used   Vaping Use    Vaping status: Never Used   Substance and Sexual Activity    Alcohol use: Yes     Alcohol/week: 7.0 standard drinks of alcohol     Types: 5 Cans of beer, 2 Shots of liquor per week     Comment: minor    Drug use: Never    Sexual activity: Not Currently     Partners: Female     Birth control/protection: Female Sterilization   Other  Topics Concern    Not on file   Social History Narrative    Not on file     Social Determinants of Health     Financial Resource Strain: Not on file   Food Insecurity: Not on file   Transportation Needs: Unknown (9/13/2021)    PRAPARE - Transportation     Lack of Transportation (Medical): Not on file     Lack of Transportation (Non-Medical): No   Physical Activity: Inactive (6/4/2024)    Exercise Vital Sign     Days of Exercise per Week: 0 days     Minutes of Exercise per Session: 0 min   Stress: No Stress Concern Present (1/6/2023)    [place] [employer] - Occupational Stress Questionnaire     Feeling of Stress : Only a little   Social Connections: Not on file   Intimate Partner Violence: Not on file   Housing Stability: Not on file        Physical Exam:     Physical Exam  Constitutional:       General: He is not in acute distress.     Appearance: He is well-developed. He is not diaphoretic.   HENT:      Head: Normocephalic and atraumatic.      Right Ear: External ear normal.      Left Ear: External ear normal.      Nose: Nose normal.   Eyes:      General: No scleral icterus.        Right eye: No discharge.         Left eye: No discharge.      Conjunctiva/sclera: Conjunctivae normal.   Neck:      Thyroid: No thyromegaly.      Vascular: No JVD.      Trachea: No tracheal deviation.   Cardiovascular:      Rate and Rhythm: Normal rate and regular rhythm.      Heart sounds: Normal heart sounds. No murmur heard.     No friction rub. No gallop.   Pulmonary:      Effort: Pulmonary effort is normal. No respiratory distress.      Breath sounds: Normal breath sounds. No wheezing or rales.   Chest:      Chest wall: No tenderness.   Abdominal:      General: Bowel sounds are normal. There is no distension.      Palpations: Abdomen is soft.      Tenderness: There is no abdominal tenderness. There is no guarding or rebound.   Musculoskeletal:         General: Tenderness present. Normal range of motion.       Cervical back: Normal range of motion and neck supple.   Lymphadenopathy:      Cervical: No cervical adenopathy.   Skin:     General: Skin is warm and dry.      Findings: No erythema or rash.   Neurological:      Mental Status: He is alert and oriented to person, place, and time.      Cranial Nerves: No cranial nerve deficit.      Motor: No abnormal muscle tone.      Coordination: Coordination normal.   Psychiatric:         Judgment: Judgment normal.          Data:     Pre-operative work-up    Laboratory Results: I have personally reviewed the pertinent laboratory results/reports     EKG: I have personally reviewed pertinent reports.            Assessment & Recommendations:     1. Pre-op examination        2. Primary osteoarthritis of left knee        3. Primary hypertension  CBC and differential    Comprehensive metabolic panel    Lipid panel    TSH, 3rd generation      4. Pure hypercholesterolemia        5. Coronary artery disease of native artery of native heart with stable angina pectoris (HCC)        6. Chronic diastolic congestive heart failure (HCC)        7. Localized edema        8. Depressive disorder        9. Obstructive sleep apnea        10. Renal lesion  MRI abdomen w contrast          Pre-Op Evaluation Assessment  68 y.o. male with planned surgery: Left total knee replacement.    Known risk factors for perioperative complications: Coronary disease  Congestive heart failure.        Pre-Op Evaluation Plan  1. Further preoperative workup as follows:   - None; no further preoperative work-up is required    2. Medication Management/Recommendations:   - None, continue medication regimen including morning of surgery, with sip of water    3. Prophylaxis for cardiac events with perioperative beta-blockers: Should consider.    4. Patient requires further consultation with: Has already seen cardiology    Clearance  Patient is CLEARED for surgery as long as he is cleared by cardiology     Delbert Ellsworth MD    [facility] [address] [address]  Phone#  [phone]  Fax#  [phone]

## 2024-06-05 ENCOUNTER — TELEPHONE (OUTPATIENT)
Age: 69
End: 2024-06-05

## 2024-06-06 ENCOUNTER — TELEPHONE (OUTPATIENT)
Dept: CARDIOLOGY CLINIC | Facility: CLINIC | Age: 69
End: 2024-06-06

## 2024-06-06 NOTE — TELEPHONE ENCOUNTER
Fax received from [facility] for clearance  Surgery date:  06/27/2024    F: [phone]    Scanned form in pts chart and placed in Dr. Ellsworth's folder.

## 2024-06-12 ENCOUNTER — TELEPHONE (OUTPATIENT)
Age: 69
End: 2024-06-12

## 2024-06-12 NOTE — TELEPHONE ENCOUNTER
Liliam from  called requesting a copy of the EKG ov 6/4/2024  Please  faxed to [phone]    Please fax unable to sent by routing.

## 2024-06-17 ENCOUNTER — PATIENT MESSAGE (OUTPATIENT)
Dept: CARDIOLOGY CLINIC | Facility: CLINIC | Age: 69
End: 2024-06-17

## 2024-06-17 ENCOUNTER — TELEPHONE (OUTPATIENT)
Dept: CARDIOLOGY CLINIC | Facility: CLINIC | Age: 69
End: 2024-06-17

## 2024-06-18 NOTE — TELEPHONE ENCOUNTER
chris Okeefe   to [facility] (supporting Leisa Ellsworth MD)         6/17/24  9:07 AM  Good Morning , my Knee surgeon recommended to reach out to you as they want me to stop taking my blood thinning medication ( Plavix )5 days before my surgery. the Surgeons name is Dr.Jonathon CANDI Edwards with [facility] [place]. [phone]  Thank you   Carlos Alberto Okeefe

## 2024-06-19 ENCOUNTER — HOSPITAL ENCOUNTER (OUTPATIENT)
Dept: MRI IMAGING | Facility: HOSPITAL | Age: 69
Discharge: HOME | End: 2024-06-19
Attending: INTERNAL MEDICINE
Payer: COMMERCIAL

## 2024-06-19 DIAGNOSIS — N28.9 RENAL LESION: ICD-10-CM

## 2024-06-19 PROCEDURE — A9585 GADOBUTROL INJECTION: HCPCS | Performed by: INTERNAL MEDICINE

## 2024-06-19 PROCEDURE — 74183 MRI ABD W/O CNTR FLWD CNTR: CPT

## 2024-06-19 RX ORDER — GADOBUTROL 604.72 MG/ML
13 INJECTION INTRAVENOUS
Status: COMPLETED | OUTPATIENT
Start: 2024-06-19 | End: 2024-06-19

## 2024-06-19 RX ADMIN — GADOBUTROL 13 ML: 604.72 INJECTION INTRAVENOUS at 19:42

## 2024-06-24 ENCOUNTER — TELEPHONE (OUTPATIENT)
Age: 69
End: 2024-06-24

## 2024-06-24 NOTE — TELEPHONE ENCOUNTER
----- Message from Delbert Ellsworth MD sent at 6/24/2024  5:20 PM EDT -----  MRI shows a benign kidney cyst

## 2024-10-03 DIAGNOSIS — I50.32 CHRONIC DIASTOLIC CONGESTIVE HEART FAILURE (HCC): ICD-10-CM

## 2024-10-03 DIAGNOSIS — I25.118 CORONARY ARTERY DISEASE OF NATIVE ARTERY OF NATIVE HEART WITH STABLE ANGINA PECTORIS (HCC): ICD-10-CM

## 2024-10-03 DIAGNOSIS — F33.1 MODERATE EPISODE OF RECURRENT MAJOR DEPRESSIVE DISORDER (HCC): ICD-10-CM

## 2024-10-03 RX ORDER — BUPROPION HYDROCHLORIDE 150 MG/1
TABLET ORAL
Qty: 90 TABLET | Refills: 0 | Status: SHIPPED | OUTPATIENT
Start: 2024-10-03

## 2024-10-03 RX ORDER — RANOLAZINE 1000 MG/1
TABLET, EXTENDED RELEASE ORAL
Qty: 180 TABLET | Refills: 2 | Status: SHIPPED | OUTPATIENT
Start: 2024-10-03

## 2024-10-09 DIAGNOSIS — I25.118 CORONARY ARTERY DISEASE OF NATIVE ARTERY OF NATIVE HEART WITH STABLE ANGINA PECTORIS (HCC): ICD-10-CM

## 2024-10-10 RX ORDER — CLOPIDOGREL BISULFATE 75 MG/1
75 TABLET ORAL DAILY
Qty: 90 TABLET | Refills: 1 | Status: SHIPPED | OUTPATIENT
Start: 2024-10-10

## 2024-11-11 ENCOUNTER — TELEPHONE (OUTPATIENT)
Dept: FAMILY MEDICINE CLINIC | Facility: MEDICAL CENTER | Age: 69
End: 2024-11-11

## 2024-11-11 NOTE — TELEPHONE ENCOUNTER
Left 3 msg's on pts vm both phone numbers, that we need to reschedule.  I also left Soni know at Dr Ryder office that pt not calling back in case surgery needs to be cancelled due to no clearance

## 2024-11-15 DIAGNOSIS — I25.10 CAD S/P PERCUTANEOUS CORONARY ANGIOPLASTY: ICD-10-CM

## 2024-11-15 DIAGNOSIS — Z98.61 CAD S/P PERCUTANEOUS CORONARY ANGIOPLASTY: ICD-10-CM

## 2024-11-15 RX ORDER — ISOSORBIDE MONONITRATE 30 MG/1
30 TABLET, EXTENDED RELEASE ORAL DAILY
Qty: 90 TABLET | Refills: 1 | Status: SHIPPED | OUTPATIENT
Start: 2024-11-15

## 2024-12-16 ENCOUNTER — OFFICE VISIT (OUTPATIENT)
Dept: CARDIOLOGY CLINIC | Facility: CLINIC | Age: 69
End: 2024-12-16
Payer: COMMERCIAL

## 2024-12-16 VITALS
HEIGHT: 69 IN | DIASTOLIC BLOOD PRESSURE: 78 MMHG | HEART RATE: 69 BPM | BODY MASS INDEX: 45.91 KG/M2 | RESPIRATION RATE: 16 BRPM | OXYGEN SATURATION: 96 % | WEIGHT: 310 LBS | SYSTOLIC BLOOD PRESSURE: 110 MMHG

## 2024-12-16 DIAGNOSIS — Z98.61 CAD S/P PERCUTANEOUS CORONARY ANGIOPLASTY: ICD-10-CM

## 2024-12-16 DIAGNOSIS — I35.0 MODERATE AORTIC VALVE STENOSIS: ICD-10-CM

## 2024-12-16 DIAGNOSIS — E78.2 MIXED HYPERLIPIDEMIA: ICD-10-CM

## 2024-12-16 DIAGNOSIS — I50.32 CHRONIC DIASTOLIC CONGESTIVE HEART FAILURE (HCC): ICD-10-CM

## 2024-12-16 DIAGNOSIS — Z01.810 PREOP CARDIOVASCULAR EXAM: Primary | ICD-10-CM

## 2024-12-16 DIAGNOSIS — I25.10 CAD S/P PERCUTANEOUS CORONARY ANGIOPLASTY: ICD-10-CM

## 2024-12-16 DIAGNOSIS — Z95.1 HX OF CABG: ICD-10-CM

## 2024-12-16 DIAGNOSIS — E66.01 MORBID OBESITY DUE TO EXCESS CALORIES (HCC): ICD-10-CM

## 2024-12-16 DIAGNOSIS — G47.33 OSA (OBSTRUCTIVE SLEEP APNEA): ICD-10-CM

## 2024-12-16 DIAGNOSIS — I10 PRIMARY HYPERTENSION: ICD-10-CM

## 2024-12-16 PROCEDURE — 99214 OFFICE O/P EST MOD 30 MIN: CPT | Performed by: INTERNAL MEDICINE

## 2024-12-16 NOTE — PROGRESS NOTES
CARDIOLOGY OFFICE VISIT  [facility]  [address] [place], [place] [id number]  Tel: [phone]      NAME: Otto Okeefe  AGE: 69 y.o.  SEX: male  : 1955  MRN: [id number]      Chief Complaint:  Chief Complaint   Patient presents with    Follow-up         History of Present Illness:   Patient comes for preop cardiac risk assessment prior to right knee surgery as well as for follow-up. States his SOB is at baseline. He denies chest pain / pressure, SOB, palpitations, lightheadedness, syncope, swelling feet, orthopnea, PND, claudication.    Chronic diastolic heart failure - on furosemide 40 mg BD, carvedilol, losartan.  States he tries to watch his salt intake closely     CAD s/p CABG x2 in  (LIMA to LAD, known to be occluded SVG to OM 2); s/p bifurcating stents from ostial LCx to mid LCx and OM1 in ; s/p KATERINE from left main to LCx in Dec 2020; small vessel CAD in distal LAD distribution - on Plavix, statin, carvedilol, losartan, Imdur, Ranexa.       Mild to moderate aortic stenosis per DEBBIE at [facility] 2023  The aortic valve is trileaflet. Moderately calcified aortic valve leaflets. There is reduced excursion of aortic valve leaflets. Mild to moderate aortic stenosis. The peak transaortic valve gradient is 45 mmHg. The mean transaortic valve gradient is 26 mmHg. The aortic valve area is 1.83 cm2 (by continuity equation). The aortic valve area is 1.4 cm2 (by 3D planimetry). The dimensionless (VTI) index is 0.44 (severe <0.25). LVOT diameter is 2.3 cm. Minimal aortic regurgitation.     Primary hypertension -  Has been hypertensive for many years.  Taking medications regularly.  Denies lightheadedness, headache, medication side effects.      Mixed hyperlipidemia -  Has had hyperlipidemia for many years.  Taking statin regularly along with diet control.  Denies myalgia.  PCP closely monitoring the blood work.    Morbid  Obesity -  Trying to lose weight.    GUME - states he uses CPAP regularly      Past Medical History:  Past Medical History:   Diagnosis Date    Arthritis 2008    Cough     Heart disease 2000    HTN (hypertension)     Hyperlipidemia     Hypertension 2000    Osteoarthritis 2002    Sleep apnea, obstructive     SOB (shortness of breath)     Wheezing          Past Surgical History:  Past Surgical History:   Procedure Laterality Date    CARDIAC CATHETERIZATION Left 1/27/2023    Procedure: Cardiac Left Heart Cath;  Surgeon: Joss Justice MD;  Location: [facility];  Service: Cardiology    CARDIAC CATHETERIZATION  1/27/2023    Procedure: Cardiac catheterization;  Surgeon: Joss Justice MD;  Location: [facility];  Service: Cardiology    CORONARY ANGIOPLASTY WITH STENT PLACEMENT N/A     CORONARY ARTERY BYPASS GRAFT      12 years ago    FL GUIDED NEEDLE PLAC BX/ASP/INJ  12/8/2017    KNEE CARTILAGE SURGERY Left     left knee meniscus repair         Family History:  Family History   Problem Relation Age of Onset    Heart disease Father     Arthritis Father     Hypertension Father     Stroke Father     Arthritis Paternal Grandfather          Social History:  Social History     Socioeconomic History    Marital status: /Civil Union     Spouse name: None    Number of children: None    Years of education: None    Highest education level: None   Occupational History    Occupation: employed   Tobacco Use    Smoking status: Never     Passive exposure: Never    Smokeless tobacco: Never    Tobacco comments:     Never Used   Vaping Use    Vaping status: Never Used   Substance and Sexual Activity    Alcohol use: Yes     Alcohol/week: 7.0 standard drinks of alcohol     Types: 5 Cans of beer, 2 Shots of liquor per week     Comment: minor    Drug use: Never    Sexual activity: Not Currently     Partners: Female     Birth control/protection: Female Sterilization   Other Topics Concern    None   Social History  Narrative    None     Social Drivers of Health     Financial Resource Strain: Low Risk  (6/27/2024)    Received from [facility]    Overall Financial Resource Strain (CARDIA)     Difficulty of Paying Living Expenses: Not hard at all   Food Insecurity: No Food Insecurity (6/27/2024)    Received from [facility]    Hunger Vital Sign     Worried About Running Out of Food in the Last Year: Never true     Ran Out of Food in the Last Year: Never true   Transportation Needs: Unknown (6/27/2024)    Received from [facility]    PRAPARE - Transportation     Lack of Transportation (Medical): Patient unable to answer     Lack of Transportation (Non-Medical): No   Physical Activity: Inactive (6/4/2024)    Exercise Vital Sign     Days of Exercise per Week: 0 days     Minutes of Exercise per Session: 0 min   Stress: No Stress Concern Present (1/6/2023)    [employer] - Occupational Stress Questionnaire     Feeling of Stress : Only a little   Social Connections: Unknown (6/18/2024)    Received from [facility]    Social Connections     How often do you feel lonely or isolated from those around you? (Adult - for ages 18 years and over): Not on file   Intimate Partner Violence: Not At Risk (6/27/2024)    Received from [facility]    Humiliation, Afraid, Rape, and Kick questionnaire     Fear of Current or Ex-Partner: No     Emotionally Abused: No     Physically Abused: No     Sexually Abused: No   Housing Stability: Unknown (6/27/2024)    Received from [facility]    Housing Stability Vital Sign     Unable to Pay for Housing in the Last Year: Patient unable to answer     Number of Times Moved in the Last Year: 1     Homeless in the Last Year: No         Active Problems:  Patient Active Problem List   Diagnosis    Pulmonary embolism (HCC)    Coronary artery disease of native artery of native heart with stable angina pectoris  (HCC)    Chronic diastolic congestive heart failure (HCC)    Varicose veins of right lower extremity    Renal lesion    Obesity hypoventilation syndrome (HCC)    Morbid obesity (HCC)    Moderate aortic valve stenosis    Obstructive sleep apnea    Primary osteoarthritis of left knee    Pure hypercholesterolemia    S/P angioplasty with stent    Seasonal allergic rhinitis due to pollen    Venous insufficiency    Hx of CABG    Localized edema    Depressive disorder    Cor pulmonale, chronic (HCC)    Chronic pain of both shoulders    Bradycardia    Nonrheumatic mitral valve regurgitation    Primary osteoarthritis of both knees    Hypertension    Neuropathy    Moderate episode of recurrent major depressive disorder (HCC)         The following portions of the patient's history were reviewed and updated as appropriate: past medical history, past surgical history, past family history,  past social history, current medications, allergies and problem list.      Review of Systems:  Constitutional: Denies fever, chills  Eyes: Denies eye redness, eye discharge  ENT: Denies sneezing, nasal discharge, sore throat   Respiratory: Denies cough, expectoration. +shortness of breath  Cardiovascular: Denies chest pain, palpitations. +lower extremity swelling  Gastrointestinal: Denies abdominal pain, nausea, vomiting, diarrhea  Genito-Urinary: Denies dysuria, incontinence  Musculoskeletal: +knee joint pain  Neurologic: Denies lightheadedness, syncope, headache, seizures  Endocrine: Denies polydipsia, temperature intolerance  Allergy and Immunology: Denies hives, insect bite sensitivity  Hematological and Lymphatic: Denies bleeding problems, swollen glands   Psychological: Denies depression, suicidal ideation, anxiety, panic  Dermatological: Denies pruritus, rash, skin lesion changes      Vitals:  Vitals:    12/16/24 1011   BP: 110/78   Pulse: 69   Resp: 16   SpO2: 96%       Body mass index is 45.78 kg/m².    Weight (last 2 days)        Date/Time Weight    12/16/24 1011 141 (310)            Physical Examination:  General: Patient is not in acute distress. Awake, alert, oriented in time, place and person. Responding to commands  Head: Normocephalic. Atraumatic  Eyes: Both pupils normal sized, round and reactive to light. Nonicteric  ENT: Normal external ear canals  Neck: Supple. JVP not raised. Trachea central. No thyromegaly  Lungs: Bilateral bronchovascular breath sounds with no crackles or rhonchi  Chest wall: No tenderness  Cardiovascular: RRR. S1 and S2 normal. No murmur, rub or gallop  Gastrointestinal: Abdomen soft, nontender. No guarding or rigidity. Liver and spleen not palpable. Bowel sounds present  Neurologic: Patient is awake, alert, oriented in time, place and person. Responding to commands. Moving all extremities  Integumentary:  No skin rash  Lymphatic: No cervical lymphadenopathy  Back: Symmetric. No CVA tenderness  Extremities: No clubbing, cyanosis or edema      Laboratory Results:  CBC with diff:   Lab Results   Component Value Date    WBC 6.94 06/04/2024    RBC 4.25 06/04/2024    HGB 13.4 06/04/2024    HCT 40.3 06/04/2024    MCV 95 06/04/2024    MCH 31.5 06/04/2024    RDW 14.3 06/04/2024     06/04/2024       CMP:  Lab Results   Component Value Date    CREATININE 1.21 06/04/2024    CREATININE 0.75 12/23/2020    BUN 19 06/04/2024    BUN 16 12/23/2020    K 4.3 06/04/2024    K 3.6 12/23/2020     06/04/2024     12/23/2020    CO2 29 06/04/2024    CO2 27 12/23/2020    ALKPHOS 80 06/04/2024    ALKPHOS 64 12/11/2020    ALT 21 06/04/2024    ALT 34 12/11/2020    AST 21 06/04/2024    AST 25 12/11/2020    BILIDIR 0.30 (H) 09/15/2021       Lab Results   Component Value Date    HGBA1C 5.3 01/06/2023    MG 2.1 09/16/2021       Lab Results   Component Value Date    TROPONINI <0.02 09/12/2021    TROPONINI <0.03 12/12/2020    TROPONINI <0.03 12/11/2020    TROPONINI <0.03 12/11/2020       Lipid Profile:   No results found for:  "\"CHOL\"  Lab Results   Component Value Date    HDL 41 06/04/2024    HDL 43 01/31/2024    HDL 45 01/06/2023     Lab Results   Component Value Date    LDLCALC 53 06/04/2024    LDLCALC 63 01/31/2024    LDLCALC 29 01/06/2023     Lab Results   Component Value Date    TRIG 98 06/04/2024    TRIG 98 01/31/2024    TRIG 234 (H) 01/06/2023       Cardiac testing:   Results for orders placed during the hospital encounter of 01/11/23    Echo complete w/ contrast if indicated    Interpretation Summary    Left Ventricle: Left ventricular cavity size is normal. Wall thickness is normal. The left ventricular ejection fraction is 55%. Systolic function is normal. Wall motion is normal. Diastolic function is normal.    Aortic Valve: The leaflets are moderately calcified. There is moderate to severe stenosis.  Peak gradient 47 mmHg and mean gradient 23 mmHg.  No major change compared to previous echocardiogram from April 2022    Mitral Valve: There is mild annular calcification.    Aorta: The ascending aorta is mildly dilated.4.0 cm.      Medications:    Current Outpatient Medications:     Ascorbic Acid (VITAMIN C PO), Take 100 mg by mouth daily, Disp: , Rfl:     atorvastatin (LIPITOR) 40 mg tablet, TAKE 1 TABLET BY MOUTH EVERY DAY WITH DINNER, Disp: 90 tablet, Rfl: 3    buPROPion (WELLBUTRIN XL) 150 mg 24 hr tablet, TAKE 1 TABLET BY MOUTH EVERY DAY IN THE MORNING, Disp: 90 tablet, Rfl: 0    carvedilol (COREG) 25 mg tablet, TAKE 1 TABLET BY MOUTH TWICE A DAY WITH FOOD, Disp: 180 tablet, Rfl: 3    clopidogrel (PLAVIX) 75 mg tablet, Take 1 tablet (75 mg total) by mouth daily, Disp: 90 tablet, Rfl: 1    DULoxetine (CYMBALTA) 60 mg delayed release capsule, TAKE 1 CAPSULE BY MOUTH EVERY DAY, Disp: 90 capsule, Rfl: 3    furosemide (LASIX) 40 mg tablet, Take 1 tablet (40 mg total) by mouth 2 (two) times a day, Disp: 180 tablet, Rfl: 3    gabapentin (NEURONTIN) 400 mg capsule, Take 1 capsule (400 mg total) by mouth 2 (two) times a day, Disp: 180 " capsule, Rfl: 3    isosorbide mononitrate (IMDUR) 30 mg 24 hr tablet, TAKE 1 TABLET BY MOUTH EVERY DAY, Disp: 90 tablet, Rfl: 1    losartan (COZAAR) 100 MG tablet, TAKE 1 TABLET BY MOUTH EVERY DAY, Disp: 90 tablet, Rfl: 3    nitroglycerin (NITROSTAT) 0.4 mg SL tablet, Place 1 tablet (0.4 mg total) under the tongue every 5 (five) minutes as needed for chest pain, Disp: 50 tablet, Rfl: 2    Omega-3 Fatty Acids (FISH OIL PO), Take 1,000 mg by mouth daily, Disp: , Rfl:     ranolazine (RANEXA) 1000 MG SR tablet, TAKE 1 TABLET (1,000 MG TOTAL) BY MOUTH EVERY 12 HOURS, Disp: 180 tablet, Rfl: 2    ZINC GLUCONATE PO, Take 50 mg by mouth daily, Disp: , Rfl:       Allergies:  Allergies   Allergen Reactions    Medical Tape Other (See Comments) and Rash    Sulfa Antibiotics Other (See Comments)    Fosinopril Cough     Cough      Lisinopril Cough    Wound Dressing Adhesive Dermatitis and Rash         Assessment and Plan:  1. Preop cardiovascular exam (Primary)  Patient has no active cardiac conditions (such as unstable cardiac syndrome, decompensated heart failure, significant arrhythmias, severe valvular disease) and has CAD and CHF but no other clinical risk factors (such as diabetes mellitus, renal insufficiency, CVA).  Patient can comfortably go up and down one flight of steps which is 4 METS.  Patient is a moderate cardiac risk patient going in for a knee surgery.  No further cardiac workup is needed at this time.  No cardiac contraindications for surgery.  Perioperative use of beta-blocker is highly recommended.     Patient can hold his Plavix 1 week prior to surgery but he needs to start aspirin 81 mg daily instead.  Postop whenever he he can safely do it, he will transition back from aspirin to Plavix    2. Chronic diastolic congestive heart failure (HCC)  Currently euvolemic.  Continue furosemide 40 mg twice daily, carvedilol 25 mg twice daily, losartan 100 mg daily.  Low-salt diet.  Daily weight    3. CAD S/P  percutaneous coronary angioplasty  4. Hx of CABG  Continue Plavix 75 mg daily, carvedilol 25 mg twice daily, atorvastatin 40 mg daily, Imdur 30 mg daily, Ranexa 1000 mg twice daily.  As needed SL NTG    5. Moderate aortic valve stenosis  To be followed up with serial echocardiograms at recommended intervals    6. Primary hypertension  Blood pressure stable.  Continue current medication.  Continue to monitor BP at home and call if abnormal    7. Mixed hyperlipidemia  Continue atorvastatin 40 mg daily and diet control.  His PCP closely monitors blood work    8. GUME (obstructive sleep apnea)  Continue to use CPAP regularly    9. Morbid obesity due to excess calories (HCC)  Try to lose weight      Recommend aggressive risk factor modification and therapeutic lifestyle changes.  Low-salt, low-calorie, low-fat, low-cholesterol diet with regular exercise and to optimize weight.  I will defer the ordering and monitoring of necessity lab studies to you, but I am available and happy to review and manage any of the data at your request in the future.    Discussed concepts of atherosclerosis, including signs and symptoms of cardiac disease.    Previous studies were reviewed.    Safety measures were reviewed.  Questions were entertained and answered.  Patient was advised to report any problems requiring medical attention.    Follow-up with PCP and appropriate specialist and lab work as discussed.    Return for follow up visit as scheduled or earlier, if needed.  Thank you for allowing me to participate in the care and evaluation of your patient.  Should you have any questions, please feel free to contact me.    Leisa Ellsworth MD  12/16/2024,11:04 AM

## 2024-12-24 ENCOUNTER — CONSULT (OUTPATIENT)
Dept: FAMILY MEDICINE CLINIC | Facility: MEDICAL CENTER | Age: 69
End: 2024-12-24
Payer: COMMERCIAL

## 2024-12-24 VITALS
BODY MASS INDEX: 45.38 KG/M2 | DIASTOLIC BLOOD PRESSURE: 70 MMHG | SYSTOLIC BLOOD PRESSURE: 110 MMHG | WEIGHT: 306.4 LBS | OXYGEN SATURATION: 95 % | RESPIRATION RATE: 18 BRPM | HEART RATE: 67 BPM | HEIGHT: 69 IN | TEMPERATURE: 98.5 F

## 2024-12-24 DIAGNOSIS — Z01.818 PRE-OP EXAMINATION: Primary | ICD-10-CM

## 2024-12-24 DIAGNOSIS — Z23 NEED FOR COVID-19 VACCINE: ICD-10-CM

## 2024-12-24 DIAGNOSIS — I10 PRIMARY HYPERTENSION: ICD-10-CM

## 2024-12-24 DIAGNOSIS — E78.00 PURE HYPERCHOLESTEROLEMIA: ICD-10-CM

## 2024-12-24 DIAGNOSIS — I50.32 CHRONIC DIASTOLIC CONGESTIVE HEART FAILURE (HCC): ICD-10-CM

## 2024-12-24 DIAGNOSIS — M17.11 PRIMARY OSTEOARTHRITIS OF RIGHT KNEE: ICD-10-CM

## 2024-12-24 DIAGNOSIS — Z23 ENCOUNTER FOR IMMUNIZATION: ICD-10-CM

## 2024-12-24 DIAGNOSIS — I25.118 CORONARY ARTERY DISEASE OF NATIVE ARTERY OF NATIVE HEART WITH STABLE ANGINA PECTORIS (HCC): ICD-10-CM

## 2024-12-24 PROCEDURE — 99214 OFFICE O/P EST MOD 30 MIN: CPT | Performed by: INTERNAL MEDICINE

## 2024-12-24 PROCEDURE — 90662 IIV NO PRSV INCREASED AG IM: CPT | Performed by: INTERNAL MEDICINE

## 2024-12-24 PROCEDURE — 90471 IMMUNIZATION ADMIN: CPT | Performed by: INTERNAL MEDICINE

## 2024-12-24 PROCEDURE — 93000 ELECTROCARDIOGRAM COMPLETE: CPT | Performed by: INTERNAL MEDICINE

## 2024-12-24 PROCEDURE — 91320 SARSCV2 VAC 30MCG TRS-SUC IM: CPT | Performed by: INTERNAL MEDICINE

## 2024-12-24 PROCEDURE — 90480 ADMN SARSCOV2 VAC 1/ONLY CMP: CPT | Performed by: INTERNAL MEDICINE

## 2024-12-24 RX ORDER — MUPIROCIN 20 MG/G
1 OINTMENT TOPICAL 2 TIMES DAILY
COMMUNITY
Start: 2024-12-11

## 2024-12-24 NOTE — ASSESSMENT & PLAN NOTE
Wt Readings from Last 3 Encounters:   12/24/24 (!) 139 kg (306 lb 6.4 oz)   12/16/24 (!) 141 kg (310 lb)   06/04/24 136 kg (299 lb 12.8 oz)     Continue furosemide

## 2024-12-24 NOTE — PROGRESS NOTES
Pre-operative Clearance  Name: Otto Okeefe      : 1955      MRN: [id number]  Encounter Provider: Delbert Ellsworth MD  Encounter Date: 2024   Encounter department: [facility]    Assessment & Plan  Pre-op examination    Orders:    POCT ECG  EKG done in the office was unchanged from that done in 2024.  Patient already had a cardiac clearance done by his cardiologist and was cleared for surgery with moderate risk.  Primary osteoarthritis of right knee  Was told to take Tylenol as needed for pain       Primary hypertension  Blood pressure is stable, continue the same medication       Pure hypercholesterolemia  Continue atorvastatin       Coronary artery disease of native artery of native heart with stable angina pectoris (HCC)  Was told to take all his medications and follow-up with cardiology       Chronic diastolic congestive heart failure (HCC)  Wt Readings from Last 3 Encounters:   24 (!) 139 kg (306 lb 6.4 oz)   24 (!) 141 kg (310 lb)   24 136 kg (299 lb 12.8 oz)     Continue furosemide               Encounter for immunization    Orders:    influenza vaccine, high-dose, PF 0.5 mL (Fluzone High Dose)    Need for COVID-19 vaccine    Orders:    COVID-19 Pfizer mRNA vaccine 12 yr and older (Comirnaty pre-filled syringe)    Pre-operative Clearance:     Clearance:  Patient is medically optimized (CLEARED) for proposed surgery without any additional cardiac testing.      Medication Instructions:   - Avoid herbs or non-directed vitamins one week prior to surgery    - Avoid aspirin containing medications or non-steroidal anti-inflammatory drugs one week preceding surgery    - May take tylenol for pain up until the night before surgery    - ACE Inhibitors or ARBs: Continue this medication up to the evening before surgery/procedure, but do not take the morning of the day of surgery.  - Antidepressants: Continue to take this medication on your normal schedule.  -  Antiepileptic meds: Continue to take this medication on your normal schedule.  - Beta blockers:  Continue to take this medication on your normal schedule.  - Clopidogrel (Plavix): Should be discontinued one week prior to planned operation, unless specifically stated otherwise by surgeon. If coronary stents have been placed in the past year, instructions for this medication should be given by cardiology.  - Diuretics: Continue taking this medication up to the evening before surgery/procedure, but do not take in the morning of the day of surgery/procedure.  - Hyperlipidemia meds: Continue to take this medication on your normal schedule.  - Nitroglycerin: Continue to take this medication on your normal schedule.      Depression Screening and Follow-up Plan: Patient was screened for depression during today's encounter. They screened negative with a PHQ-9 score of 4.      History of Present Illness     HPI  Patient is here for preoperative clearance.  He is getting a right total knee replacement done on January 8, 2025 to be done by Dr. Jacob Edwards in [facility].  He has no major complaints    Review of Systems   Constitutional:  Negative for chills, diaphoresis, fatigue and fever.   HENT:  Negative for congestion, ear discharge, ear pain, hearing loss, postnasal drip, rhinorrhea, sinus pressure, sinus pain, sneezing, sore throat and voice change.    Eyes:  Negative for pain, discharge, redness and visual disturbance.   Respiratory:  Negative for cough, chest tightness, shortness of breath and wheezing.    Cardiovascular:  Negative for chest pain, palpitations and leg swelling.   Gastrointestinal:  Negative for abdominal distention, abdominal pain, blood in stool, constipation, diarrhea, nausea and vomiting.   Endocrine: Negative for cold intolerance, heat intolerance, polydipsia, polyphagia and polyuria.   Genitourinary:  Negative for dysuria, flank pain, frequency, hematuria and urgency.    Musculoskeletal:  Positive for arthralgias and gait problem. Negative for back pain, joint swelling, myalgias, neck pain and neck stiffness.   Skin:  Negative for rash.   Neurological:  Negative for dizziness, tremors, syncope, facial asymmetry, speech difficulty, weakness, light-headedness, numbness and headaches.   Hematological:  Does not bruise/bleed easily.   Psychiatric/Behavioral:  Negative for behavioral problems, confusion and sleep disturbance. The patient is not nervous/anxious.      Past Medical History   Past Medical History:   Diagnosis Date    Arthritis 2008    Cough     Heart disease 2000    HTN (hypertension)     Hyperlipidemia     Hypertension 2000    Osteoarthritis 2002    Sleep apnea, obstructive     SOB (shortness of breath)     Wheezing      Past Surgical History:   Procedure Laterality Date    CARDIAC CATHETERIZATION Left 1/27/2023    Procedure: Cardiac Left Heart Cath;  Surgeon: Joss Justice MD;  Location: [facility];  Service: Cardiology    CARDIAC CATHETERIZATION  1/27/2023    Procedure: Cardiac catheterization;  Surgeon: Joss Justice MD;  Location: [facility];  Service: Cardiology    CORONARY ANGIOPLASTY WITH STENT PLACEMENT N/A     CORONARY ARTERY BYPASS GRAFT      12 years ago    FL GUIDED NEEDLE PLAC BX/ASP/INJ  12/8/2017    KNEE CARTILAGE SURGERY Left     left knee meniscus repair     Family History   Problem Relation Age of Onset    Heart disease Father     Arthritis Father     Hypertension Father     Stroke Father     Arthritis Paternal Grandfather      Social History     Tobacco Use    Smoking status: Never     Passive exposure: Never    Smokeless tobacco: Never    Tobacco comments:     Never Used   Vaping Use    Vaping status: Never Used   Substance and Sexual Activity    Alcohol use: Yes     Alcohol/week: 7.0 standard drinks of alcohol     Types: 5 Cans of beer, 2 Shots of liquor per week     Comment: minor    Drug use: Never    Sexual activity: Not  "Currently     Partners: Female     Birth control/protection: Female Sterilization     Current Outpatient Medications on File Prior to Visit   Medication Sig    Ascorbic Acid (VITAMIN C PO) Take 100 mg by mouth daily    atorvastatin (LIPITOR) 40 mg tablet TAKE 1 TABLET BY MOUTH EVERY DAY WITH DINNER    buPROPion (WELLBUTRIN XL) 150 mg 24 hr tablet TAKE 1 TABLET BY MOUTH EVERY DAY IN THE MORNING    carvedilol (COREG) 25 mg tablet TAKE 1 TABLET BY MOUTH TWICE A DAY WITH FOOD    clopidogrel (PLAVIX) 75 mg tablet Take 1 tablet (75 mg total) by mouth daily    DULoxetine (CYMBALTA) 60 mg delayed release capsule TAKE 1 CAPSULE BY MOUTH EVERY DAY    furosemide (LASIX) 40 mg tablet Take 1 tablet (40 mg total) by mouth 2 (two) times a day    gabapentin (NEURONTIN) 400 mg capsule Take 1 capsule (400 mg total) by mouth 2 (two) times a day    isosorbide mononitrate (IMDUR) 30 mg 24 hr tablet TAKE 1 TABLET BY MOUTH EVERY DAY    losartan (COZAAR) 100 MG tablet TAKE 1 TABLET BY MOUTH EVERY DAY    mupirocin (BACTROBAN) 2 % ointment Apply 1 Application topically 2 (two) times a day    nitroglycerin (NITROSTAT) 0.4 mg SL tablet Place 1 tablet (0.4 mg total) under the tongue every 5 (five) minutes as needed for chest pain    Omega-3 Fatty Acids (FISH OIL PO) Take 1,000 mg by mouth daily    ranolazine (RANEXA) 1000 MG SR tablet TAKE 1 TABLET (1,000 MG TOTAL) BY MOUTH EVERY 12 HOURS    ZINC GLUCONATE PO Take 50 mg by mouth daily     Allergies   Allergen Reactions    Medical Tape Other (See Comments) and Rash    Sulfa Antibiotics Other (See Comments)    Fosinopril Cough     Cough      Lisinopril Cough    Wound Dressing Adhesive Dermatitis and Rash     Objective   BP (!) 86/60 (BP Location: Left arm, Patient Position: Sitting, Cuff Size: Large)   Pulse 67   Temp 98.5 °F (36.9 °C) (Temporal)   Resp 18   Ht 5' 9\" (1.753 m)   Wt (!) 139 kg (306 lb 6.4 oz)   SpO2 95%   BMI 45.25 kg/m²     Physical Exam  Constitutional:       General: He " is not in acute distress.     Appearance: He is well-developed. He is not diaphoretic.   HENT:      Head: Normocephalic and atraumatic.      Right Ear: External ear normal.      Left Ear: External ear normal.      Nose: Nose normal.   Eyes:      General: No scleral icterus.        Right eye: No discharge.         Left eye: No discharge.      Conjunctiva/sclera: Conjunctivae normal.   Cardiovascular:      Rate and Rhythm: Normal rate and regular rhythm.      Heart sounds: Normal heart sounds. No murmur heard.     No friction rub. No gallop.   Pulmonary:      Effort: Pulmonary effort is normal. No respiratory distress.      Breath sounds: Normal breath sounds. No wheezing or rales.   Abdominal:      General: Bowel sounds are normal. There is no distension.      Palpations: Abdomen is soft.      Tenderness: There is no abdominal tenderness. There is no guarding or rebound.   Musculoskeletal:         General: Tenderness present.   Skin:     General: Skin is warm and dry.      Findings: No erythema or rash.   Neurological:      Mental Status: He is alert and oriented to person, place, and time.      Cranial Nerves: No cranial nerve deficit.      Sensory: No sensory deficit.      Motor: No abnormal muscle tone.   Psychiatric:         Behavior: Behavior normal.           Delbert Ellsworth MD

## 2025-01-06 DIAGNOSIS — I25.118 CORONARY ARTERY DISEASE OF NATIVE ARTERY OF NATIVE HEART WITH STABLE ANGINA PECTORIS (HCC): ICD-10-CM

## 2025-01-06 DIAGNOSIS — F33.1 MODERATE EPISODE OF RECURRENT MAJOR DEPRESSIVE DISORDER (HCC): ICD-10-CM

## 2025-01-06 RX ORDER — CARVEDILOL 25 MG/1
25 TABLET ORAL 2 TIMES DAILY WITH MEALS
Qty: 180 TABLET | Refills: 3 | Status: SHIPPED | OUTPATIENT
Start: 2025-01-06

## 2025-01-06 RX ORDER — BUPROPION HYDROCHLORIDE 150 MG/1
150 TABLET ORAL EVERY MORNING
Qty: 90 TABLET | Refills: 0 | Status: SHIPPED | OUTPATIENT
Start: 2025-01-06

## 2025-01-28 DIAGNOSIS — G62.9 NEUROPATHY: ICD-10-CM

## 2025-01-28 RX ORDER — GABAPENTIN 400 MG/1
400 CAPSULE ORAL 2 TIMES DAILY
Qty: 180 CAPSULE | Refills: 1 | Status: SHIPPED | OUTPATIENT
Start: 2025-01-28 | End: 2025-01-30 | Stop reason: SDUPTHER

## 2025-01-28 NOTE — TELEPHONE ENCOUNTER
Patient called to request a refill for their gabapentin (NEURONTIN) 400 mg capsule  advised a refill was requested on 1/28/25 and is pending approval. Patient verbalized understanding and is in agreement.

## 2025-01-28 NOTE — TELEPHONE ENCOUNTER
Reason for call:   [x] Refill   [] Prior Auth  [] Other:     Office:   [x] PCP/Provider -   [] Specialty/Provider -     Medication: gabapentin (NEURONTIN) 400 mg capsule     Dose/Frequency: 2 times daily     Quantity: 180 cap    Pharmacy: [facility]/pharmacy #[id number] - [place] - [address]     Does the patient have enough for 3 days?   [x] Yes   [] No - Send as HP to POD

## 2025-01-30 RX ORDER — GABAPENTIN 400 MG/1
400 CAPSULE ORAL 2 TIMES DAILY
Qty: 180 CAPSULE | Refills: 1 | Status: SHIPPED | OUTPATIENT
Start: 2025-01-30

## 2025-01-30 NOTE — TELEPHONE ENCOUNTER
Patient called states [facility] did not get the script sent on 1/28/25. Please resend

## 2025-02-18 DIAGNOSIS — F32.A DEPRESSIVE DISORDER: ICD-10-CM

## 2025-02-18 DIAGNOSIS — E78.00 PURE HYPERCHOLESTEROLEMIA: ICD-10-CM

## 2025-02-18 DIAGNOSIS — I50.32 CHRONIC DIASTOLIC CONGESTIVE HEART FAILURE (HCC): ICD-10-CM

## 2025-02-19 DIAGNOSIS — I50.32 CHRONIC DIASTOLIC CONGESTIVE HEART FAILURE (HCC): ICD-10-CM

## 2025-02-19 RX ORDER — LOSARTAN POTASSIUM 100 MG/1
100 TABLET ORAL DAILY
Qty: 90 TABLET | Refills: 1 | Status: SHIPPED | OUTPATIENT
Start: 2025-02-19

## 2025-02-19 RX ORDER — DULOXETIN HYDROCHLORIDE 60 MG/1
60 CAPSULE, DELAYED RELEASE ORAL DAILY
Qty: 90 CAPSULE | Refills: 1 | Status: SHIPPED | OUTPATIENT
Start: 2025-02-19

## 2025-02-19 RX ORDER — ATORVASTATIN CALCIUM 40 MG/1
40 TABLET, FILM COATED ORAL
Qty: 90 TABLET | Refills: 1 | Status: SHIPPED | OUTPATIENT
Start: 2025-02-19

## 2025-02-20 RX ORDER — FUROSEMIDE 40 MG/1
40 TABLET ORAL 2 TIMES DAILY
Qty: 180 TABLET | Refills: 3 | Status: SHIPPED | OUTPATIENT
Start: 2025-02-20

## 2025-05-31 DIAGNOSIS — I25.118 CORONARY ARTERY DISEASE OF NATIVE ARTERY OF NATIVE HEART WITH STABLE ANGINA PECTORIS (HCC): ICD-10-CM

## 2025-06-01 RX ORDER — CLOPIDOGREL BISULFATE 75 MG/1
75 TABLET ORAL DAILY
Qty: 90 TABLET | Refills: 1 | Status: SHIPPED | OUTPATIENT
Start: 2025-06-01

## 2025-06-03 DIAGNOSIS — F33.1 MODERATE EPISODE OF RECURRENT MAJOR DEPRESSIVE DISORDER (HCC): ICD-10-CM

## 2025-06-03 RX ORDER — BUPROPION HYDROCHLORIDE 150 MG/1
150 TABLET ORAL EVERY MORNING
Qty: 90 TABLET | Refills: 1 | Status: SHIPPED | OUTPATIENT
Start: 2025-06-03

## 2025-07-19 DIAGNOSIS — I25.10 CAD S/P PERCUTANEOUS CORONARY ANGIOPLASTY: ICD-10-CM

## 2025-07-19 DIAGNOSIS — Z98.61 CAD S/P PERCUTANEOUS CORONARY ANGIOPLASTY: ICD-10-CM

## 2025-07-21 RX ORDER — ISOSORBIDE MONONITRATE 30 MG/1
30 TABLET, EXTENDED RELEASE ORAL DAILY
Qty: 90 TABLET | Refills: 1 | OUTPATIENT
Start: 2025-07-21
